# Patient Record
Sex: MALE | Race: BLACK OR AFRICAN AMERICAN | NOT HISPANIC OR LATINO | Employment: FULL TIME | URBAN - METROPOLITAN AREA
[De-identification: names, ages, dates, MRNs, and addresses within clinical notes are randomized per-mention and may not be internally consistent; named-entity substitution may affect disease eponyms.]

---

## 2024-03-12 ENCOUNTER — CLINICAL SUPPORT (OUTPATIENT)
Dept: EMERGENCY MEDICINE | Facility: HOSPITAL | Age: 44
End: 2024-03-12
Payer: COMMERCIAL

## 2024-03-12 ENCOUNTER — HOSPITAL ENCOUNTER (EMERGENCY)
Facility: HOSPITAL | Age: 44
Discharge: HOME | End: 2024-03-12
Attending: STUDENT IN AN ORGANIZED HEALTH CARE EDUCATION/TRAINING PROGRAM
Payer: COMMERCIAL

## 2024-03-12 ENCOUNTER — APPOINTMENT (OUTPATIENT)
Dept: RADIOLOGY | Facility: HOSPITAL | Age: 44
End: 2024-03-12
Payer: COMMERCIAL

## 2024-03-12 VITALS
RESPIRATION RATE: 18 BRPM | HEIGHT: 67 IN | DIASTOLIC BLOOD PRESSURE: 92 MMHG | WEIGHT: 185 LBS | BODY MASS INDEX: 29.03 KG/M2 | TEMPERATURE: 97.4 F | SYSTOLIC BLOOD PRESSURE: 194 MMHG | OXYGEN SATURATION: 96 % | HEART RATE: 94 BPM

## 2024-03-12 DIAGNOSIS — M10.9 ACUTE GOUT OF RIGHT ANKLE, UNSPECIFIED CAUSE: Primary | ICD-10-CM

## 2024-03-12 LAB
ABO GROUP (TYPE) IN BLOOD: NORMAL
ALBUMIN SERPL BCP-MCNC: 3.6 G/DL (ref 3.4–5)
ALP SERPL-CCNC: 48 U/L (ref 33–120)
ALT SERPL W P-5'-P-CCNC: 9 U/L (ref 10–52)
ANION GAP SERPL CALC-SCNC: 14 MMOL/L (ref 10–20)
ANTIBODY SCREEN: NORMAL
APTT PPP: 34 SECONDS (ref 27–38)
AST SERPL W P-5'-P-CCNC: 19 U/L (ref 9–39)
ATRIAL RATE: 105 BPM
BASOPHILS # BLD AUTO: 0.12 X10*3/UL (ref 0–0.1)
BASOPHILS NFR BLD AUTO: 0.8 %
BILIRUB SERPL-MCNC: 0.7 MG/DL (ref 0–1.2)
BUN SERPL-MCNC: 18 MG/DL (ref 6–23)
CALCIUM SERPL-MCNC: 9 MG/DL (ref 8.6–10.6)
CHLORIDE SERPL-SCNC: 100 MMOL/L (ref 98–107)
CO2 SERPL-SCNC: 27 MMOL/L (ref 21–32)
CREAT SERPL-MCNC: 1.11 MG/DL (ref 0.5–1.3)
CRP SERPL-MCNC: 4.45 MG/DL
EGFRCR SERPLBLD CKD-EPI 2021: 84 ML/MIN/1.73M*2
EOSINOPHIL # BLD AUTO: 0.26 X10*3/UL (ref 0–0.7)
EOSINOPHIL NFR BLD AUTO: 1.8 %
ERYTHROCYTE [DISTWIDTH] IN BLOOD BY AUTOMATED COUNT: 13.4 % (ref 11.5–14.5)
ERYTHROCYTE [SEDIMENTATION RATE] IN BLOOD BY WESTERGREN METHOD: 44 MM/H (ref 0–15)
GLUCOSE SERPL-MCNC: 172 MG/DL (ref 74–99)
HCT VFR BLD AUTO: 45.1 % (ref 41–52)
HGB BLD-MCNC: 15.8 G/DL (ref 13.5–17.5)
IMM GRANULOCYTES # BLD AUTO: 0.04 X10*3/UL (ref 0–0.7)
IMM GRANULOCYTES NFR BLD AUTO: 0.3 % (ref 0–0.9)
INR PPP: 1.1 (ref 0.9–1.1)
LYMPHOCYTES # BLD AUTO: 2.5 X10*3/UL (ref 1.2–4.8)
LYMPHOCYTES NFR BLD AUTO: 17.3 %
MCH RBC QN AUTO: 26.5 PG (ref 26–34)
MCHC RBC AUTO-ENTMCNC: 35 G/DL (ref 32–36)
MCV RBC AUTO: 76 FL (ref 80–100)
MONOCYTES # BLD AUTO: 1.17 X10*3/UL (ref 0.1–1)
MONOCYTES NFR BLD AUTO: 8.1 %
NEUTROPHILS # BLD AUTO: 10.35 X10*3/UL (ref 1.2–7.7)
NEUTROPHILS NFR BLD AUTO: 71.7 %
NRBC BLD-RTO: 0 /100 WBCS (ref 0–0)
P AXIS: 77 DEGREES
P OFFSET: 199 MS
P ONSET: 145 MS
PLATELET # BLD AUTO: 214 X10*3/UL (ref 150–450)
POTASSIUM SERPL-SCNC: 3.4 MMOL/L (ref 3.5–5.3)
PR INTERVAL: 146 MS
PROT SERPL-MCNC: 7.4 G/DL (ref 6.4–8.2)
PROTHROMBIN TIME: 12.5 SECONDS (ref 9.8–12.8)
Q ONSET: 218 MS
QRS COUNT: 17 BEATS
QRS DURATION: 86 MS
QT INTERVAL: 392 MS
QTC CALCULATION(BAZETT): 518 MS
QTC FREDERICIA: 472 MS
R AXIS: 68 DEGREES
RBC # BLD AUTO: 5.97 X10*6/UL (ref 4.5–5.9)
RH FACTOR (ANTIGEN D): NORMAL
SODIUM SERPL-SCNC: 138 MMOL/L (ref 136–145)
T AXIS: 58 DEGREES
T OFFSET: 414 MS
VENTRICULAR RATE: 105 BPM
WBC # BLD AUTO: 14.4 X10*3/UL (ref 4.4–11.3)

## 2024-03-12 PROCEDURE — 80053 COMPREHEN METABOLIC PANEL: CPT | Performed by: STUDENT IN AN ORGANIZED HEALTH CARE EDUCATION/TRAINING PROGRAM

## 2024-03-12 PROCEDURE — 71045 X-RAY EXAM CHEST 1 VIEW: CPT | Performed by: STUDENT IN AN ORGANIZED HEALTH CARE EDUCATION/TRAINING PROGRAM

## 2024-03-12 PROCEDURE — 85025 COMPLETE CBC W/AUTO DIFF WBC: CPT | Performed by: STUDENT IN AN ORGANIZED HEALTH CARE EDUCATION/TRAINING PROGRAM

## 2024-03-12 PROCEDURE — 36415 COLL VENOUS BLD VENIPUNCTURE: CPT | Performed by: STUDENT IN AN ORGANIZED HEALTH CARE EDUCATION/TRAINING PROGRAM

## 2024-03-12 PROCEDURE — 73610 X-RAY EXAM OF ANKLE: CPT | Mod: RIGHT SIDE | Performed by: STUDENT IN AN ORGANIZED HEALTH CARE EDUCATION/TRAINING PROGRAM

## 2024-03-12 PROCEDURE — 99284 EMERGENCY DEPT VISIT MOD MDM: CPT | Mod: 25

## 2024-03-12 PROCEDURE — 2500000005 HC RX 250 GENERAL PHARMACY W/O HCPCS: Performed by: STUDENT IN AN ORGANIZED HEALTH CARE EDUCATION/TRAINING PROGRAM

## 2024-03-12 PROCEDURE — 96374 THER/PROPH/DIAG INJ IV PUSH: CPT | Mod: 59

## 2024-03-12 PROCEDURE — 85652 RBC SED RATE AUTOMATED: CPT | Performed by: STUDENT IN AN ORGANIZED HEALTH CARE EDUCATION/TRAINING PROGRAM

## 2024-03-12 PROCEDURE — 71045 X-RAY EXAM CHEST 1 VIEW: CPT

## 2024-03-12 PROCEDURE — 96375 TX/PRO/DX INJ NEW DRUG ADDON: CPT | Mod: 59

## 2024-03-12 PROCEDURE — 96361 HYDRATE IV INFUSION ADD-ON: CPT | Mod: 59

## 2024-03-12 PROCEDURE — 86901 BLOOD TYPING SEROLOGIC RH(D): CPT | Performed by: STUDENT IN AN ORGANIZED HEALTH CARE EDUCATION/TRAINING PROGRAM

## 2024-03-12 PROCEDURE — 93005 ELECTROCARDIOGRAM TRACING: CPT

## 2024-03-12 PROCEDURE — 2500000004 HC RX 250 GENERAL PHARMACY W/ HCPCS (ALT 636 FOR OP/ED): Performed by: STUDENT IN AN ORGANIZED HEALTH CARE EDUCATION/TRAINING PROGRAM

## 2024-03-12 PROCEDURE — 85730 THROMBOPLASTIN TIME PARTIAL: CPT | Performed by: STUDENT IN AN ORGANIZED HEALTH CARE EDUCATION/TRAINING PROGRAM

## 2024-03-12 PROCEDURE — 20605 DRAIN/INJ JOINT/BURSA W/O US: CPT | Mod: RT

## 2024-03-12 PROCEDURE — 86140 C-REACTIVE PROTEIN: CPT | Performed by: STUDENT IN AN ORGANIZED HEALTH CARE EDUCATION/TRAINING PROGRAM

## 2024-03-12 PROCEDURE — 73610 X-RAY EXAM OF ANKLE: CPT | Mod: RT

## 2024-03-12 PROCEDURE — 99285 EMERGENCY DEPT VISIT HI MDM: CPT | Performed by: STUDENT IN AN ORGANIZED HEALTH CARE EDUCATION/TRAINING PROGRAM

## 2024-03-12 RX ORDER — PREDNISONE 20 MG/1
20 TABLET ORAL DAILY
Qty: 10 TABLET | Refills: 0 | Status: SHIPPED | OUTPATIENT
Start: 2024-03-12 | End: 2024-03-22

## 2024-03-12 RX ORDER — ACETAMINOPHEN 325 MG/1
975 TABLET ORAL ONCE
Status: COMPLETED | OUTPATIENT
Start: 2024-03-12 | End: 2024-03-12

## 2024-03-12 RX ORDER — DIPHENHYDRAMINE HYDROCHLORIDE 50 MG/ML
25 INJECTION INTRAMUSCULAR; INTRAVENOUS ONCE
Status: COMPLETED | OUTPATIENT
Start: 2024-03-12 | End: 2024-03-12

## 2024-03-12 RX ORDER — METOCLOPRAMIDE HYDROCHLORIDE 5 MG/ML
10 INJECTION INTRAMUSCULAR; INTRAVENOUS ONCE
Status: COMPLETED | OUTPATIENT
Start: 2024-03-12 | End: 2024-03-12

## 2024-03-12 RX ORDER — LIDOCAINE 560 MG/1
1 PATCH PERCUTANEOUS; TOPICAL; TRANSDERMAL ONCE
Status: DISCONTINUED | OUTPATIENT
Start: 2024-03-12 | End: 2024-03-12 | Stop reason: HOSPADM

## 2024-03-12 RX ORDER — KETOROLAC TROMETHAMINE 15 MG/ML
15 INJECTION, SOLUTION INTRAMUSCULAR; INTRAVENOUS ONCE
Status: COMPLETED | OUTPATIENT
Start: 2024-03-12 | End: 2024-03-12

## 2024-03-12 RX ADMIN — SODIUM CHLORIDE, POTASSIUM CHLORIDE, SODIUM LACTATE AND CALCIUM CHLORIDE 1000 ML: 600; 310; 30; 20 INJECTION, SOLUTION INTRAVENOUS at 05:22

## 2024-03-12 RX ADMIN — KETOROLAC TROMETHAMINE 15 MG: 15 INJECTION, SOLUTION INTRAMUSCULAR; INTRAVENOUS at 05:20

## 2024-03-12 RX ADMIN — ACETAMINOPHEN 975 MG: 325 TABLET ORAL at 05:21

## 2024-03-12 RX ADMIN — METOCLOPRAMIDE 10 MG: 5 INJECTION, SOLUTION INTRAMUSCULAR; INTRAVENOUS at 07:32

## 2024-03-12 RX ADMIN — LIDOCAINE 1 PATCH: 4 PATCH TOPICAL at 05:22

## 2024-03-12 RX ADMIN — DIPHENHYDRAMINE HYDROCHLORIDE 25 MG: 50 INJECTION INTRAMUSCULAR; INTRAVENOUS at 07:32

## 2024-03-12 ASSESSMENT — PAIN DESCRIPTION - LOCATION: LOCATION: FOOT

## 2024-03-12 ASSESSMENT — PAIN SCALES - GENERAL: PAINLEVEL_OUTOF10: 10 - WORST POSSIBLE PAIN

## 2024-03-12 ASSESSMENT — PAIN - FUNCTIONAL ASSESSMENT: PAIN_FUNCTIONAL_ASSESSMENT: 0-10

## 2024-03-12 NOTE — DISCHARGE INSTRUCTIONS
Please return to the emergency department if you notice any new redness, swelling, fevers, chills, or warmth around your affected joint as this may to get an infection that could acutely threaten your joint.

## 2024-03-12 NOTE — ED TRIAGE NOTES
Pt brought in for R foot pain and swelling by EMS. PT hypertensive and tachycardic for EMS. Pt reports being compliant with his blood pressure meds.

## 2024-03-12 NOTE — Clinical Note
Theron Moran was seen and treated in our emergency department on 3/12/2024.  He may return to work on 03/13/2024.       If you have any questions or concerns, please don't hesitate to call.      Hemant Ashley MD

## 2024-03-12 NOTE — PROGRESS NOTES
ATTENDING NOTE for Hemant Ashley MD:    ATTENDING ATTESTATION:  The patient was seen by the resident/fellow.  I have personally performed a substantive portion of the encounter.  I have seen and examined the patient; agree with the workup, evaluation, MDM, management and diagnosis.  The care plan has been discussed with the resident/fellow; I have reviewed the resident/fellow´s note and agree with the documented findings with the exception/addition of the following:    Patient is a 43-year-old man who presents with right leg swelling and pain.  Patient reports he has had the symptoms for few weeks and went to [facility] last week for the same symptoms.  He reports that he has been taking colchicine which has not been able to control symptoms.  No fevers or chills sudden worsening or redness.  Denies any falls or trauma.  He also reports that he has had a persistent headache throughout the duration and is worried by his blood pressure.  He reports that the headache was not sudden in onset or maximal in onset.  No neck stiffness fevers or chills.  Suspicion for subarachnoid hemorrhage meningitis encephalitis is low.  Doubt giant cell arteritis given no ocular complaints.  He had a CT scan at [facility] last week which was negative for mass.  Doubt dural venous thrombus.  Doubt cavernous sinus thrombus.  Doubt acute angle-closure glaucoma.  The exact etiology of his headache is unclear but suspect is likely a migraine.  Low suspicion for life-threatening pathology.  Patient's right foot pain seems most consistent with gout.  Doubt DVT and we do not see any evidence of a necrotizing soft tissue infection or cellulitis.  Good distal pulses and perfusion point away from arterial occlusion.  Patient had swelling of the first MCP as well as the ankle but not the knee.  No calf tenderness or swelling.  I am able to range the ankle little bit but this does cause pain which raises suspicion for septic arthritis  however given the time course, septic arthritis seems less likely.  We did discuss that we could try arthrocentesis and he provided consent to do this.  Patient did not tolerate the procedure due to pain and asked us to stop.  We were unable to get any fluid which points away from a large effusion.  I did discuss we could try again with a different  but the patient did not want to do so.  I discussed the risks of delayed diagnosis and septic arthritis and patient expressed understanding of this.  He has capacity make this decision so I want to respect his autonomy.  My suspicion for septic arthritis is low as well.  We treated his pain here and discussed using a walking boot as well as crutches and the need to follow-up urgently with sports medicine orthopedic surgery.  Patient was hypertensive but has no symptoms of hypertensive emergency.  We discussed the need to follow-up with his regular doctor discuss his blood pressure as well.

## 2024-03-12 NOTE — PROGRESS NOTES
Emergency Medicine Transition of Care Note.    I assumed care of Theron Moran at signout.  Please see the previous ED provider note for all HPI, PE and MDM prior to my arrival. This is in addition to the primary record.    In brief Theron Moran is an 43 y.o. male presenting for   Chief Complaint   Patient presents with    Foot Pain     At the time of signout we were awaiting:  reassessment following treatment of his headache and trial of ambulation.    On reassessment, patient's headache had improved.  He was able to range his right ankle.  The joint is swollen but not erythematous or warm to the touch.  Patient is afebrile and his tachycardia resolved following analgesia and migraine cocktail.  Prior team had low clinical suspicion for septic joint and I agree with this assessment.  Patient does have a history of gout and I believe his presentation today is consistent with gouty arthritis flare.  Patient felt well going home and was discharged home in stable condition.          Procedure  Procedures    Parish Mathis, DO

## 2024-03-12 NOTE — ED PROVIDER NOTES
HPI   Chief Complaint   Patient presents with    Foot Pain       HPI     Patient is a 43-year-old male with a past medical history of diabetes, gout, hypertension, migraines, polysubstance use disorder including THC to gain and tobacco, subarachnoid hemorrhage in 2017 presenting to the emergency department with right foot pain.  Of note, patient was seen 8 days ago at [facility] emergency department for similar symptoms.  States that his pain and swelling of his right ankle has been present for approximately a month.  Initially involved only his big toe but now extends to his entire foot.  Denies any erythema, warmth on the extremity.  Denies any fever, chills, chest pain, abdominal pain, nausea, vomiting, pain with urination, blood in urine or stool.  States that symptoms feels directly similar to his past episodes of gout exacerbation.  At his most recent visit to the ER, was prescribed colchicine and NSAIDs which she has been taking with only moderate symptomatic improvement.  His pain was colicky and worse tonight and so called 911 who brought him to the emergency department for further evaluation.  Patient denies any recent trauma to the site.               Naomi Coma Scale Score: 15                     Patient History   No past medical history on file.  No past surgical history on file.  No family history on file.  Social History     Tobacco Use    Smoking status: Not on file    Smokeless tobacco: Not on file   Substance Use Topics    Alcohol use: Not on file    Drug use: Not on file       Physical Exam   ED Triage Vitals   Temperature Heart Rate Respirations BP   03/12/24 0513 03/12/24 0513 03/12/24 0513 03/12/24 0513   36.5 °C (97.7 °F) (!) 107 20 (!) 213/121      Pulse Ox Temp Source Heart Rate Source Patient Position   03/12/24 0513 03/12/24 0513 03/12/24 0718 --   94 % Oral Monitor       BP Location FiO2 (%)     -- --             Physical Exam  Constitutional:       Appearance: He is not toxic-appearing or  diaphoretic.   HENT:      Head: Normocephalic and atraumatic.      Nose: Nose normal.   Eyes:      General: No scleral icterus.        Right eye: No discharge.         Left eye: No discharge.      Conjunctiva/sclera: Conjunctivae normal.   Cardiovascular:      Rate and Rhythm: Normal rate.      Heart sounds: No murmur heard.     No friction rub. No gallop.   Pulmonary:      Effort: No respiratory distress.      Breath sounds: Normal breath sounds.   Abdominal:      General: There is no distension.      Palpations: Abdomen is soft.   Musculoskeletal:         General: No deformity or signs of injury.      Cervical back: Neck supple. No rigidity.      Comments: Swollen and tender to palpation right ankle.  No overlying erythema, warmth.  Patient's right great toe is also swollen and slightly tender to palpation when compared to the contralateral side.  2+ bilateral DP pulses palpated.  Patient does have full range of motion of his right ankle, however it is limited somewhat secondary to pain.  Acutely tender to palpation over the right ankle.   Skin:     General: Skin is warm and dry.   Neurological:      Mental Status: He is alert.   Psychiatric:         Mood and Affect: Mood normal.         Behavior: Behavior normal.         ED Course & MDM   Diagnoses as of 03/12/24 0809   Acute gout of right ankle, unspecified cause       Medical Decision Making  Patient is a 43-year-old male presenting to the emergency department with ankle pain.  I do feel that the patient's most likely diagnosis is a gout flare.  However, given that his symptoms have been present for a month with only moderate improvement with colchicine and NSAIDs, I did discuss with the patient the possibility of a latent joint infection.  We did perform a arthrocentesis after verbally consenting the patient.  After the area was numbed with focal lidocaine with epinephrine, a needle was inserted into the joint.  Unable to aspirate contents.  Patient very  poorly tolerated the procedure and requested that it be aborted early.  After patient was recovered from the procedure, did discuss a second attempt, using a second provider to attempt, using the ultrasound for better landmark demarcation, and further pain medication to allow for the procedure.  Patient stated that he no longer wanted the procedure.  Additionally stated that after the poke with the needle, his pain had improved significantly.  However, he did not want to risk worsening pain with another needle insertion.  I did discuss with patient that this may risk a limb threatening diagnosis such as a septic joint that could result in loss of the patient's foot, severe lifelong disability, and possible death.  Patient voiced understanding of these possible risks, had decision-making capacity, and still declined further repeat attempt at arthrocentesis.  Overall clinical concern for septic joint is much lower than alternative etiologies as demonstrated by patient's range of motion of his joint, lack of a significant effusion on ultrasound exam, lack of overlying erythema, fact that the patient's swelling and pain has been subacute in onset and present for over a month without acute recent change, and lack of any systemic symptoms of fever and chills.  Although it was against my medical advice, the patient did refuse further repeat procedure.  After this entire process, patient then stated that he also had a headache that he had had for the past several hours, but had not initially mentioned on presentation.  Denied any recent falls, vision change, hearing change, or other focal neurologic signs.  Patient requested further medication to help manage his headache.  Given that patient l was initially complaining of other aches and pains and did not mention his headache, and only secondarily complained of it and that it was not the worst pain headache of his life and was not associate with any new focal neurologic  symptoms, my overall clinical concern for subarachnoid hemorrhage is low.  A headache cocktail was ordered.  Patient will be handed off in stable condition pending reevaluation following meds for a headache.    Procedure  Procedures     Reuben López MD  Resident  03/12/24 0830

## 2024-09-21 ENCOUNTER — HOSPITAL ENCOUNTER (OUTPATIENT)
Age: 44
Setting detail: OBSERVATION
Discharge: NURSING, REHAB OR LONG-TERM CARE | End: 2024-09-23
Attending: EMERGENCY MEDICINE | Admitting: HOSPITALIST
Payer: COMMERCIAL

## 2024-09-21 DIAGNOSIS — F19.10 SUBSTANCE ABUSE (HCC): ICD-10-CM

## 2024-09-21 DIAGNOSIS — R51.9 HEADACHE, UNSPECIFIED HEADACHE TYPE: ICD-10-CM

## 2024-09-21 DIAGNOSIS — I16.0 HYPERTENSIVE URGENCY: ICD-10-CM

## 2024-09-21 DIAGNOSIS — R07.9 CHEST PAIN, UNSPECIFIED TYPE: Primary | ICD-10-CM

## 2024-09-21 DIAGNOSIS — R79.89 ELEVATED TROPONIN: ICD-10-CM

## 2024-09-21 PROCEDURE — 99285 EMERGENCY DEPT VISIT HI MDM: CPT

## 2024-09-22 ENCOUNTER — APPOINTMENT (OUTPATIENT)
Dept: CT IMAGING | Age: 44
End: 2024-09-22
Payer: COMMERCIAL

## 2024-09-22 ENCOUNTER — APPOINTMENT (OUTPATIENT)
Dept: GENERAL RADIOLOGY | Age: 44
End: 2024-09-22
Payer: COMMERCIAL

## 2024-09-22 PROBLEM — R79.89 ELEVATED TROPONIN: Status: ACTIVE | Noted: 2024-09-22

## 2024-09-22 PROBLEM — E66.811 CLASS 1 OBESITY WITHOUT SERIOUS COMORBIDITY WITH BODY MASS INDEX (BMI) OF 32.0 TO 32.9 IN ADULT: Status: ACTIVE | Noted: 2024-09-22

## 2024-09-22 PROBLEM — E11.65 TYPE 2 DIABETES MELLITUS WITH HYPERGLYCEMIA, WITHOUT LONG-TERM CURRENT USE OF INSULIN (HCC): Status: ACTIVE | Noted: 2024-09-22

## 2024-09-22 PROBLEM — I16.0 HYPERTENSIVE URGENCY: Status: ACTIVE | Noted: 2024-09-22

## 2024-09-22 PROBLEM — E66.9 CLASS 1 OBESITY WITHOUT SERIOUS COMORBIDITY WITH BODY MASS INDEX (BMI) OF 32.0 TO 32.9 IN ADULT: Status: ACTIVE | Noted: 2024-09-22

## 2024-09-22 PROBLEM — F14.10 COCAINE USE DISORDER (HCC): Status: ACTIVE | Noted: 2024-09-22

## 2024-09-22 PROBLEM — R07.9 CHEST PAIN: Status: ACTIVE | Noted: 2024-09-22

## 2024-09-22 LAB
ALBUMIN SERPL-MCNC: 3.4 G/DL (ref 3.5–5.2)
ALP SERPL-CCNC: 67 U/L (ref 40–129)
ALT SERPL-CCNC: 41 U/L (ref 0–40)
AMPHET UR QL SCN: NEGATIVE
ANION GAP SERPL CALCULATED.3IONS-SCNC: 15 MMOL/L (ref 7–16)
AST SERPL-CCNC: 24 U/L (ref 0–39)
BARBITURATES UR QL SCN: NEGATIVE
BASOPHILS # BLD: 0.08 K/UL (ref 0–0.2)
BASOPHILS NFR BLD: 1 % (ref 0–2)
BENZODIAZ UR QL: NEGATIVE
BILIRUB SERPL-MCNC: 0.2 MG/DL (ref 0–1.2)
BUN SERPL-MCNC: 19 MG/DL (ref 6–20)
BUPRENORPHINE UR QL: NEGATIVE
CALCIUM SERPL-MCNC: 8.3 MG/DL (ref 8.6–10.2)
CANNABINOIDS UR QL SCN: NEGATIVE
CHLORIDE SERPL-SCNC: 106 MMOL/L (ref 98–107)
CHOLEST SERPL-MCNC: 160 MG/DL
CO2 SERPL-SCNC: 22 MMOL/L (ref 22–29)
COCAINE UR QL SCN: POSITIVE
CREAT SERPL-MCNC: 1.2 MG/DL (ref 0.7–1.2)
EOSINOPHIL # BLD: 0.42 K/UL (ref 0.05–0.5)
EOSINOPHILS RELATIVE PERCENT: 3 % (ref 0–6)
ERYTHROCYTE [DISTWIDTH] IN BLOOD BY AUTOMATED COUNT: 14.7 % (ref 11.5–15)
FENTANYL UR QL: NEGATIVE
GFR, ESTIMATED: 75 ML/MIN/1.73M2
GLUCOSE BLD-MCNC: 107 MG/DL (ref 74–99)
GLUCOSE BLD-MCNC: 138 MG/DL (ref 74–99)
GLUCOSE BLD-MCNC: 154 MG/DL (ref 74–99)
GLUCOSE SERPL-MCNC: 185 MG/DL (ref 74–99)
HBA1C MFR BLD: 6.9 % (ref 4–5.6)
HCT VFR BLD AUTO: 43.4 % (ref 37–54)
HDLC SERPL-MCNC: 58 MG/DL
HGB BLD-MCNC: 13.6 G/DL (ref 12.5–16.5)
IMM GRANULOCYTES # BLD AUTO: 0.22 K/UL (ref 0–0.58)
IMM GRANULOCYTES NFR BLD: 2 % (ref 0–5)
LDLC SERPL CALC-MCNC: 91 MG/DL
LYMPHOCYTES NFR BLD: 1.98 K/UL (ref 1.5–4)
LYMPHOCYTES RELATIVE PERCENT: 13 % (ref 20–42)
MCH RBC QN AUTO: 26.2 PG (ref 26–35)
MCHC RBC AUTO-ENTMCNC: 31.3 G/DL (ref 32–34.5)
MCV RBC AUTO: 83.6 FL (ref 80–99.9)
METHADONE UR QL: NEGATIVE
MONOCYTES NFR BLD: 1.36 K/UL (ref 0.1–0.95)
MONOCYTES NFR BLD: 9 % (ref 2–12)
NEUTROPHILS NFR BLD: 73 % (ref 43–80)
NEUTS SEG NFR BLD: 10.84 K/UL (ref 1.8–7.3)
OPIATES UR QL SCN: NEGATIVE
OXYCODONE UR QL SCN: NEGATIVE
PCP UR QL SCN: NEGATIVE
PLATELET # BLD AUTO: 216 K/UL (ref 130–450)
PMV BLD AUTO: 11.8 FL (ref 7–12)
POTASSIUM SERPL-SCNC: 4 MMOL/L (ref 3.5–5)
PROT SERPL-MCNC: 6.4 G/DL (ref 6.4–8.3)
RBC # BLD AUTO: 5.19 M/UL (ref 3.8–5.8)
SODIUM SERPL-SCNC: 143 MMOL/L (ref 132–146)
TEST INFORMATION: ABNORMAL
TRIGL SERPL-MCNC: 55 MG/DL
TROPONIN I SERPL HS-MCNC: 85 NG/L (ref 0–11)
TROPONIN I SERPL HS-MCNC: 87 NG/L (ref 0–11)
TROPONIN I SERPL HS-MCNC: 93 NG/L (ref 0–11)
TROPONIN I SERPL HS-MCNC: 94 NG/L (ref 0–11)
TSH SERPL DL<=0.05 MIU/L-ACNC: 2.1 UIU/ML (ref 0.27–4.2)
VLDLC SERPL CALC-MCNC: 11 MG/DL
WBC OTHER # BLD: 14.9 K/UL (ref 4.5–11.5)

## 2024-09-22 PROCEDURE — 93005 ELECTROCARDIOGRAM TRACING: CPT | Performed by: NURSE PRACTITIONER

## 2024-09-22 PROCEDURE — 96374 THER/PROPH/DIAG INJ IV PUSH: CPT

## 2024-09-22 PROCEDURE — 96372 THER/PROPH/DIAG INJ SC/IM: CPT

## 2024-09-22 PROCEDURE — 80053 COMPREHEN METABOLIC PANEL: CPT

## 2024-09-22 PROCEDURE — 96375 TX/PRO/DX INJ NEW DRUG ADDON: CPT

## 2024-09-22 PROCEDURE — G0378 HOSPITAL OBSERVATION PER HR: HCPCS

## 2024-09-22 PROCEDURE — 85025 COMPLETE CBC W/AUTO DIFF WBC: CPT

## 2024-09-22 PROCEDURE — 83036 HEMOGLOBIN GLYCOSYLATED A1C: CPT

## 2024-09-22 PROCEDURE — 6370000000 HC RX 637 (ALT 250 FOR IP): Performed by: EMERGENCY MEDICINE

## 2024-09-22 PROCEDURE — 6370000000 HC RX 637 (ALT 250 FOR IP): Performed by: HOSPITALIST

## 2024-09-22 PROCEDURE — 70450 CT HEAD/BRAIN W/O DYE: CPT

## 2024-09-22 PROCEDURE — 6370000000 HC RX 637 (ALT 250 FOR IP): Performed by: INTERNAL MEDICINE

## 2024-09-22 PROCEDURE — 99222 1ST HOSP IP/OBS MODERATE 55: CPT | Performed by: HOSPITALIST

## 2024-09-22 PROCEDURE — 99223 1ST HOSP IP/OBS HIGH 75: CPT | Performed by: INTERNAL MEDICINE

## 2024-09-22 PROCEDURE — 96376 TX/PRO/DX INJ SAME DRUG ADON: CPT

## 2024-09-22 PROCEDURE — APPSS60 APP SPLIT SHARED TIME 46-60 MINUTES

## 2024-09-22 PROCEDURE — 2580000003 HC RX 258: Performed by: HOSPITALIST

## 2024-09-22 PROCEDURE — 6360000002 HC RX W HCPCS: Performed by: EMERGENCY MEDICINE

## 2024-09-22 PROCEDURE — 6360000002 HC RX W HCPCS: Performed by: HOSPITALIST

## 2024-09-22 PROCEDURE — 71046 X-RAY EXAM CHEST 2 VIEWS: CPT

## 2024-09-22 PROCEDURE — 80307 DRUG TEST PRSMV CHEM ANLYZR: CPT

## 2024-09-22 PROCEDURE — 80061 LIPID PANEL: CPT

## 2024-09-22 PROCEDURE — 84484 ASSAY OF TROPONIN QUANT: CPT

## 2024-09-22 PROCEDURE — 82962 GLUCOSE BLOOD TEST: CPT

## 2024-09-22 PROCEDURE — 84443 ASSAY THYROID STIM HORMONE: CPT

## 2024-09-22 RX ORDER — HYDRALAZINE HYDROCHLORIDE 20 MG/ML
10 INJECTION INTRAMUSCULAR; INTRAVENOUS ONCE
Status: COMPLETED | OUTPATIENT
Start: 2024-09-22 | End: 2024-09-22

## 2024-09-22 RX ORDER — POLYETHYLENE GLYCOL 3350 17 G/17G
17 POWDER, FOR SOLUTION ORAL DAILY PRN
Status: DISCONTINUED | OUTPATIENT
Start: 2024-09-22 | End: 2024-09-23 | Stop reason: HOSPADM

## 2024-09-22 RX ORDER — INSULIN LISPRO 100 [IU]/ML
0-4 INJECTION, SOLUTION INTRAVENOUS; SUBCUTANEOUS
Status: DISCONTINUED | OUTPATIENT
Start: 2024-09-22 | End: 2024-09-23 | Stop reason: HOSPADM

## 2024-09-22 RX ORDER — HYDRALAZINE HYDROCHLORIDE 25 MG/1
100 TABLET, FILM COATED ORAL EVERY 8 HOURS SCHEDULED
Status: DISCONTINUED | OUTPATIENT
Start: 2024-09-22 | End: 2024-09-23 | Stop reason: HOSPADM

## 2024-09-22 RX ORDER — DIPHENHYDRAMINE HYDROCHLORIDE 50 MG/ML
25 INJECTION INTRAMUSCULAR; INTRAVENOUS ONCE
Status: COMPLETED | OUTPATIENT
Start: 2024-09-22 | End: 2024-09-22

## 2024-09-22 RX ORDER — LISINOPRIL 10 MG/1
40 TABLET ORAL DAILY
Status: DISCONTINUED | OUTPATIENT
Start: 2024-09-22 | End: 2024-09-23 | Stop reason: HOSPADM

## 2024-09-22 RX ORDER — HYDRALAZINE HYDROCHLORIDE 100 MG/1
100 TABLET, FILM COATED ORAL 3 TIMES DAILY
COMMUNITY

## 2024-09-22 RX ORDER — MORPHINE SULFATE 4 MG/ML
4 INJECTION, SOLUTION INTRAMUSCULAR; INTRAVENOUS ONCE
Status: COMPLETED | OUTPATIENT
Start: 2024-09-22 | End: 2024-09-22

## 2024-09-22 RX ORDER — ASPIRIN 81 MG/1
81 TABLET, CHEWABLE ORAL DAILY
Status: DISCONTINUED | OUTPATIENT
Start: 2024-09-23 | End: 2024-09-23 | Stop reason: HOSPADM

## 2024-09-22 RX ORDER — ATORVASTATIN CALCIUM 40 MG/1
80 TABLET, FILM COATED ORAL DAILY
Status: DISCONTINUED | OUTPATIENT
Start: 2024-09-22 | End: 2024-09-23 | Stop reason: HOSPADM

## 2024-09-22 RX ORDER — POTASSIUM CHLORIDE 7.45 MG/ML
10 INJECTION INTRAVENOUS PRN
Status: DISCONTINUED | OUTPATIENT
Start: 2024-09-22 | End: 2024-09-23 | Stop reason: HOSPADM

## 2024-09-22 RX ORDER — NIFEDIPINE 90 MG/1
90 TABLET, FILM COATED, EXTENDED RELEASE ORAL 2 TIMES DAILY
COMMUNITY

## 2024-09-22 RX ORDER — ONDANSETRON 4 MG/1
4 TABLET, ORALLY DISINTEGRATING ORAL EVERY 8 HOURS PRN
Status: DISCONTINUED | OUTPATIENT
Start: 2024-09-22 | End: 2024-09-23 | Stop reason: HOSPADM

## 2024-09-22 RX ORDER — SODIUM CHLORIDE 0.9 % (FLUSH) 0.9 %
5-40 SYRINGE (ML) INJECTION PRN
Status: DISCONTINUED | OUTPATIENT
Start: 2024-09-22 | End: 2024-09-23 | Stop reason: HOSPADM

## 2024-09-22 RX ORDER — ACETAMINOPHEN 650 MG/1
650 SUPPOSITORY RECTAL EVERY 6 HOURS PRN
Status: DISCONTINUED | OUTPATIENT
Start: 2024-09-22 | End: 2024-09-23 | Stop reason: HOSPADM

## 2024-09-22 RX ORDER — ACETAMINOPHEN 325 MG/1
650 TABLET ORAL EVERY 6 HOURS PRN
Status: DISCONTINUED | OUTPATIENT
Start: 2024-09-22 | End: 2024-09-23 | Stop reason: HOSPADM

## 2024-09-22 RX ORDER — HYDRALAZINE HYDROCHLORIDE 20 MG/ML
10 INJECTION INTRAMUSCULAR; INTRAVENOUS EVERY 4 HOURS PRN
Status: DISCONTINUED | OUTPATIENT
Start: 2024-09-22 | End: 2024-09-23 | Stop reason: HOSPADM

## 2024-09-22 RX ORDER — ALBUTEROL SULFATE 90 UG/1
2 INHALANT RESPIRATORY (INHALATION) EVERY 4 HOURS PRN
COMMUNITY

## 2024-09-22 RX ORDER — ONDANSETRON 2 MG/ML
4 INJECTION INTRAMUSCULAR; INTRAVENOUS EVERY 6 HOURS PRN
Status: DISCONTINUED | OUTPATIENT
Start: 2024-09-22 | End: 2024-09-23 | Stop reason: HOSPADM

## 2024-09-22 RX ORDER — MAGNESIUM SULFATE IN WATER 40 MG/ML
2000 INJECTION, SOLUTION INTRAVENOUS PRN
Status: DISCONTINUED | OUTPATIENT
Start: 2024-09-22 | End: 2024-09-23 | Stop reason: HOSPADM

## 2024-09-22 RX ORDER — INSULIN LISPRO 100 [IU]/ML
0-4 INJECTION, SOLUTION INTRAVENOUS; SUBCUTANEOUS NIGHTLY
Status: DISCONTINUED | OUTPATIENT
Start: 2024-09-22 | End: 2024-09-23 | Stop reason: HOSPADM

## 2024-09-22 RX ORDER — METOCLOPRAMIDE HYDROCHLORIDE 5 MG/ML
10 INJECTION INTRAMUSCULAR; INTRAVENOUS ONCE
Status: COMPLETED | OUTPATIENT
Start: 2024-09-22 | End: 2024-09-22

## 2024-09-22 RX ORDER — SODIUM CHLORIDE 0.9 % (FLUSH) 0.9 %
5-40 SYRINGE (ML) INJECTION EVERY 12 HOURS SCHEDULED
Status: DISCONTINUED | OUTPATIENT
Start: 2024-09-22 | End: 2024-09-23 | Stop reason: HOSPADM

## 2024-09-22 RX ORDER — DILTIAZEM HYDROCHLORIDE 120 MG/1
120 CAPSULE, COATED, EXTENDED RELEASE ORAL DAILY
Status: DISCONTINUED | OUTPATIENT
Start: 2024-09-22 | End: 2024-09-23 | Stop reason: HOSPADM

## 2024-09-22 RX ORDER — ATORVASTATIN CALCIUM 40 MG/1
80 TABLET, FILM COATED ORAL NIGHTLY
Status: DISCONTINUED | OUTPATIENT
Start: 2024-09-22 | End: 2024-09-22

## 2024-09-22 RX ORDER — SODIUM CHLORIDE 9 MG/ML
INJECTION, SOLUTION INTRAVENOUS PRN
Status: DISCONTINUED | OUTPATIENT
Start: 2024-09-22 | End: 2024-09-23 | Stop reason: HOSPADM

## 2024-09-22 RX ORDER — POTASSIUM CHLORIDE 1500 MG/1
40 TABLET, EXTENDED RELEASE ORAL PRN
Status: DISCONTINUED | OUTPATIENT
Start: 2024-09-22 | End: 2024-09-23 | Stop reason: HOSPADM

## 2024-09-22 RX ORDER — ENOXAPARIN SODIUM 100 MG/ML
1 INJECTION SUBCUTANEOUS ONCE
Status: COMPLETED | OUTPATIENT
Start: 2024-09-22 | End: 2024-09-22

## 2024-09-22 RX ADMIN — HYDRALAZINE HYDROCHLORIDE 10 MG: 20 INJECTION, SOLUTION INTRAMUSCULAR; INTRAVENOUS at 02:16

## 2024-09-22 RX ADMIN — HYDRALAZINE HYDROCHLORIDE 100 MG: 25 TABLET ORAL at 18:06

## 2024-09-22 RX ADMIN — LISINOPRIL 40 MG: 10 TABLET ORAL at 10:23

## 2024-09-22 RX ADMIN — DILTIAZEM HYDROCHLORIDE 120 MG: 120 CAPSULE, COATED, EXTENDED RELEASE ORAL at 18:02

## 2024-09-22 RX ADMIN — HYDRALAZINE HYDROCHLORIDE 100 MG: 25 TABLET ORAL at 20:18

## 2024-09-22 RX ADMIN — MORPHINE SULFATE 4 MG: 4 INJECTION, SOLUTION INTRAMUSCULAR; INTRAVENOUS at 03:48

## 2024-09-22 RX ADMIN — HYDRALAZINE HYDROCHLORIDE 10 MG: 20 INJECTION, SOLUTION INTRAMUSCULAR; INTRAVENOUS at 06:14

## 2024-09-22 RX ADMIN — ENOXAPARIN SODIUM 100 MG: 100 INJECTION SUBCUTANEOUS at 10:23

## 2024-09-22 RX ADMIN — NITROGLYCERIN 0.5 INCH: 20 OINTMENT TOPICAL at 02:27

## 2024-09-22 RX ADMIN — DIPHENHYDRAMINE HYDROCHLORIDE 25 MG: 50 INJECTION INTRAMUSCULAR; INTRAVENOUS at 02:29

## 2024-09-22 RX ADMIN — SODIUM CHLORIDE, PRESERVATIVE FREE 10 ML: 5 INJECTION INTRAVENOUS at 20:18

## 2024-09-22 RX ADMIN — ATORVASTATIN CALCIUM 80 MG: 40 TABLET, FILM COATED ORAL at 10:23

## 2024-09-22 RX ADMIN — METOCLOPRAMIDE 10 MG: 5 INJECTION, SOLUTION INTRAMUSCULAR; INTRAVENOUS at 02:29

## 2024-09-22 RX ADMIN — HYDRALAZINE HYDROCHLORIDE 10 MG: 20 INJECTION, SOLUTION INTRAMUSCULAR; INTRAVENOUS at 03:57

## 2024-09-22 ASSESSMENT — PAIN DESCRIPTION - ORIENTATION: ORIENTATION: MID

## 2024-09-22 ASSESSMENT — LIFESTYLE VARIABLES
HOW MANY STANDARD DRINKS CONTAINING ALCOHOL DO YOU HAVE ON A TYPICAL DAY: PATIENT DOES NOT DRINK
HOW OFTEN DO YOU HAVE A DRINK CONTAINING ALCOHOL: NEVER

## 2024-09-22 ASSESSMENT — PAIN DESCRIPTION - LOCATION
LOCATION: CHEST

## 2024-09-22 ASSESSMENT — PAIN SCALES - GENERAL
PAINLEVEL_OUTOF10: 0
PAINLEVEL_OUTOF10: 7
PAINLEVEL_OUTOF10: 0
PAINLEVEL_OUTOF10: 7
PAINLEVEL_OUTOF10: 3

## 2024-09-22 ASSESSMENT — PAIN - FUNCTIONAL ASSESSMENT
PAIN_FUNCTIONAL_ASSESSMENT: 0-10
PAIN_FUNCTIONAL_ASSESSMENT: ACTIVITIES ARE NOT PREVENTED

## 2024-09-22 ASSESSMENT — PAIN DESCRIPTION - FREQUENCY: FREQUENCY: CONTINUOUS

## 2024-09-22 ASSESSMENT — PAIN DESCRIPTION - ONSET: ONSET: SUDDEN

## 2024-09-22 ASSESSMENT — PAIN DESCRIPTION - DESCRIPTORS: DESCRIPTORS: HEAVINESS

## 2024-09-22 ASSESSMENT — PAIN DESCRIPTION - PAIN TYPE: TYPE: ACUTE PAIN

## 2024-09-23 ENCOUNTER — APPOINTMENT (OUTPATIENT)
Age: 44
End: 2024-09-23
Attending: INTERNAL MEDICINE
Payer: COMMERCIAL

## 2024-09-23 ENCOUNTER — APPOINTMENT (OUTPATIENT)
Dept: NUCLEAR MEDICINE | Age: 44
End: 2024-09-23
Attending: INTERNAL MEDICINE
Payer: COMMERCIAL

## 2024-09-23 VITALS
HEART RATE: 99 BPM | DIASTOLIC BLOOD PRESSURE: 68 MMHG | RESPIRATION RATE: 16 BRPM | BODY MASS INDEX: 32.96 KG/M2 | OXYGEN SATURATION: 97 % | WEIGHT: 210 LBS | SYSTOLIC BLOOD PRESSURE: 120 MMHG | TEMPERATURE: 98.6 F | HEIGHT: 67 IN

## 2024-09-23 LAB
B PERT DNA SPEC QL NAA+PROBE: NOT DETECTED
C PNEUM DNA NPH QL NAA+NON-PROBE: NOT DETECTED
CALCIUM SERPL-MCNC: 8.3 MG/DL (ref 8.6–10.2)
CHLORIDE SERPL-SCNC: 105 MMOL/L (ref 98–107)
CO2 SERPL-SCNC: 25 MMOL/L (ref 22–29)
CREAT SERPL-MCNC: 1.1 MG/DL (ref 0.7–1.2)
ECHO BSA: 2.12 M2
EKG ATRIAL RATE: 102 BPM
EKG P AXIS: 79 DEGREES
EKG P-R INTERVAL: 146 MS
EKG Q-T INTERVAL: 348 MS
EKG QRS DURATION: 88 MS
EKG QTC CALCULATION (BAZETT): 453 MS
EKG R AXIS: 58 DEGREES
EKG T AXIS: 82 DEGREES
EKG VENTRICULAR RATE: 102 BPM
ERYTHROCYTE [DISTWIDTH] IN BLOOD BY AUTOMATED COUNT: 15.8 % (ref 11.5–15)
FLUAV RNA NPH QL NAA+NON-PROBE: NOT DETECTED
FLUBV RNA NPH QL NAA+NON-PROBE: NOT DETECTED
GFR, ESTIMATED: 83 ML/MIN/1.73M2
GLUCOSE BLD-MCNC: 105 MG/DL (ref 74–99)
GLUCOSE BLD-MCNC: 164 MG/DL (ref 74–99)
GLUCOSE SERPL-MCNC: 110 MG/DL (ref 74–99)
HADV DNA NPH QL NAA+NON-PROBE: NOT DETECTED
HCOV 229E RNA NPH QL NAA+NON-PROBE: NOT DETECTED
HCOV NL63 RNA NPH QL NAA+NON-PROBE: NOT DETECTED
HCT VFR BLD AUTO: 44.2 % (ref 37–54)
HGB BLD-MCNC: 13.9 G/DL (ref 12.5–16.5)
HMPV RNA NPH QL NAA+NON-PROBE: NOT DETECTED
HPIV1 RNA NPH QL NAA+NON-PROBE: NOT DETECTED
HPIV2 RNA NPH QL NAA+NON-PROBE: NOT DETECTED
HPIV3 RNA NPH QL NAA+NON-PROBE: NOT DETECTED
HPIV4 RNA NPH QL NAA+NON-PROBE: NOT DETECTED
M PNEUMO DNA NPH QL NAA+NON-PROBE: NOT DETECTED
MAGNESIUM SERPL-MCNC: 1.7 MG/DL (ref 1.6–2.6)
MCH RBC QN AUTO: 25.8 PG (ref 26–35)
MCHC RBC AUTO-ENTMCNC: 31.4 G/DL (ref 32–34.5)
MCV RBC AUTO: 82.2 FL (ref 80–99.9)
NUC STRESS EJECTION FRACTION: 62 %
PLATELET, FLUORESCENCE: 190 K/UL (ref 130–450)
PMV BLD AUTO: 12 FL (ref 7–12)
POTASSIUM SERPL-SCNC: 4.3 MMOL/L (ref 3.5–5)
RBC # BLD AUTO: 5.38 M/UL (ref 3.8–5.8)
RV+EV RNA NPH QL NAA+NON-PROBE: NOT DETECTED
SARS-COV-2 RNA NPH QL NAA+NON-PROBE: NOT DETECTED
SODIUM SERPL-SCNC: 139 MMOL/L (ref 132–146)
SPECIMEN DESCRIPTION: NORMAL
STRESS BASELINE DIAS BP: 104 MMHG
STRESS BASELINE HR: 98 BPM
STRESS BASELINE SYS BP: 144 MMHG
STRESS ESTIMATED WORKLOAD: 1 METS
STRESS PEAK DIAS BP: 96 MMHG
STRESS PEAK SYS BP: 162 MMHG
STRESS PERCENT HR ACHIEVED: 65 %
STRESS POST PEAK HR: 115 BPM
STRESS RATE PRESSURE PRODUCT: NORMAL BPM*MMHG
STRESS ST DEPRESSION: 0 MM
STRESS TARGET HR: 177 BPM
TID: 1.03
WBC OTHER # BLD: 13.2 K/UL (ref 4.5–11.5)

## 2024-09-23 PROCEDURE — 0202U NFCT DS 22 TRGT SARS-COV-2: CPT

## 2024-09-23 PROCEDURE — 3430000000 HC RX DIAGNOSTIC RADIOPHARMACEUTICAL: Performed by: RADIOLOGY

## 2024-09-23 PROCEDURE — 99232 SBSQ HOSP IP/OBS MODERATE 35: CPT | Performed by: INTERNAL MEDICINE

## 2024-09-23 PROCEDURE — 82962 GLUCOSE BLOOD TEST: CPT

## 2024-09-23 PROCEDURE — 78452 HT MUSCLE IMAGE SPECT MULT: CPT

## 2024-09-23 PROCEDURE — 6370000000 HC RX 637 (ALT 250 FOR IP): Performed by: INTERNAL MEDICINE

## 2024-09-23 PROCEDURE — G0378 HOSPITAL OBSERVATION PER HR: HCPCS

## 2024-09-23 PROCEDURE — 93016 CV STRESS TEST SUPVJ ONLY: CPT | Performed by: INTERNAL MEDICINE

## 2024-09-23 PROCEDURE — 93017 CV STRESS TEST TRACING ONLY: CPT

## 2024-09-23 PROCEDURE — A9500 TC99M SESTAMIBI: HCPCS | Performed by: RADIOLOGY

## 2024-09-23 PROCEDURE — 78452 HT MUSCLE IMAGE SPECT MULT: CPT | Performed by: INTERNAL MEDICINE

## 2024-09-23 PROCEDURE — 93018 CV STRESS TEST I&R ONLY: CPT | Performed by: INTERNAL MEDICINE

## 2024-09-23 PROCEDURE — 93010 ELECTROCARDIOGRAM REPORT: CPT | Performed by: INTERNAL MEDICINE

## 2024-09-23 PROCEDURE — 99231 SBSQ HOSP IP/OBS SF/LOW 25: CPT | Performed by: INTERNAL MEDICINE

## 2024-09-23 PROCEDURE — 6370000000 HC RX 637 (ALT 250 FOR IP): Performed by: HOSPITALIST

## 2024-09-23 PROCEDURE — 83735 ASSAY OF MAGNESIUM: CPT

## 2024-09-23 PROCEDURE — 85027 COMPLETE CBC AUTOMATED: CPT

## 2024-09-23 PROCEDURE — 6360000002 HC RX W HCPCS: Performed by: INTERNAL MEDICINE

## 2024-09-23 PROCEDURE — 2580000003 HC RX 258: Performed by: HOSPITALIST

## 2024-09-23 PROCEDURE — 80048 BASIC METABOLIC PNL TOTAL CA: CPT

## 2024-09-23 RX ORDER — REGADENOSON 0.08 MG/ML
0.4 INJECTION, SOLUTION INTRAVENOUS
Status: COMPLETED | OUTPATIENT
Start: 2024-09-23 | End: 2024-09-23

## 2024-09-23 RX ORDER — LISINOPRIL 40 MG/1
40 TABLET ORAL DAILY
DISCHARGE
Start: 2024-09-24

## 2024-09-23 RX ORDER — ATORVASTATIN CALCIUM 80 MG/1
80 TABLET, FILM COATED ORAL DAILY
DISCHARGE
Start: 2024-09-24

## 2024-09-23 RX ORDER — TETRAKIS(2-METHOXYISOBUTYLISOCYANIDE)COPPER(I) TETRAFLUOROBORATE 1 MG/ML
10 INJECTION, POWDER, LYOPHILIZED, FOR SOLUTION INTRAVENOUS
Status: COMPLETED | OUTPATIENT
Start: 2024-09-23 | End: 2024-09-23

## 2024-09-23 RX ORDER — TETRAKIS(2-METHOXYISOBUTYLISOCYANIDE)COPPER(I) TETRAFLUOROBORATE 1 MG/ML
30 INJECTION, POWDER, LYOPHILIZED, FOR SOLUTION INTRAVENOUS
Status: COMPLETED | OUTPATIENT
Start: 2024-09-23 | End: 2024-09-23

## 2024-09-23 RX ADMIN — REGADENOSON 0.4 MG: 0.08 INJECTION, SOLUTION INTRAVENOUS at 14:00

## 2024-09-23 RX ADMIN — Medication 35 MILLICURIE: at 14:05

## 2024-09-23 RX ADMIN — LISINOPRIL 40 MG: 10 TABLET ORAL at 07:50

## 2024-09-23 RX ADMIN — ATORVASTATIN CALCIUM 80 MG: 40 TABLET, FILM COATED ORAL at 07:50

## 2024-09-23 RX ADMIN — Medication 10 MILLICURIE: at 12:39

## 2024-09-23 RX ADMIN — ASPIRIN 81 MG 81 MG: 81 TABLET ORAL at 07:50

## 2024-09-23 RX ADMIN — SODIUM CHLORIDE, PRESERVATIVE FREE 10 ML: 5 INJECTION INTRAVENOUS at 07:50

## 2024-09-23 RX ADMIN — HYDRALAZINE HYDROCHLORIDE 100 MG: 25 TABLET ORAL at 04:40

## 2024-09-23 RX ADMIN — DILTIAZEM HYDROCHLORIDE 120 MG: 120 CAPSULE, COATED, EXTENDED RELEASE ORAL at 08:10

## 2024-09-23 ASSESSMENT — PAIN SCALES - GENERAL: PAINLEVEL_OUTOF10: 0

## 2024-09-23 ASSESSMENT — PAIN - FUNCTIONAL ASSESSMENT: PAIN_FUNCTIONAL_ASSESSMENT: NONE - DENIES PAIN

## 2024-10-08 ENCOUNTER — TELEPHONE (OUTPATIENT)
Dept: CARDIOLOGY CLINIC | Age: 44
End: 2024-10-08

## 2024-10-08 NOTE — TELEPHONE ENCOUNTER
Upon review of chart, patient was discharged to [facility] in [place].  I spoke with Tung at [facility] who advised patient was discharged on 9/28/24.  Patient lives in [place].  Will address F/U if patient calls to schedule.

## 2025-01-26 ENCOUNTER — APPOINTMENT (OUTPATIENT)
Dept: RADIOLOGY | Facility: HOSPITAL | Age: 45
End: 2025-01-26
Payer: COMMERCIAL

## 2025-01-26 ENCOUNTER — CLINICAL SUPPORT (OUTPATIENT)
Dept: EMERGENCY MEDICINE | Facility: HOSPITAL | Age: 45
End: 2025-01-26
Payer: COMMERCIAL

## 2025-01-26 ENCOUNTER — HOSPITAL ENCOUNTER (OUTPATIENT)
Facility: HOSPITAL | Age: 45
Setting detail: OBSERVATION
Discharge: LEFT BEFORE DISCHARGE | End: 2025-01-28
Attending: EMERGENCY MEDICINE | Admitting: INTERNAL MEDICINE
Payer: COMMERCIAL

## 2025-01-26 DIAGNOSIS — F10.10 ALCOHOL ABUSE: ICD-10-CM

## 2025-01-26 DIAGNOSIS — R07.9 ACUTE CHEST PAIN: Primary | ICD-10-CM

## 2025-01-26 DIAGNOSIS — M10.9 ACUTE GOUT INVOLVING TOE OF LEFT FOOT, UNSPECIFIED CAUSE: ICD-10-CM

## 2025-01-26 DIAGNOSIS — I10 ESSENTIAL (PRIMARY) HYPERTENSION: ICD-10-CM

## 2025-01-26 DIAGNOSIS — I16.1 HYPERTENSIVE EMERGENCY: ICD-10-CM

## 2025-01-26 LAB
ALBUMIN SERPL BCP-MCNC: 4 G/DL (ref 3.4–5)
ALP SERPL-CCNC: 47 U/L (ref 33–120)
ALT SERPL W P-5'-P-CCNC: 40 U/L (ref 10–52)
AMPHETAMINES UR QL SCN: ABNORMAL
ANION GAP SERPL CALC-SCNC: 14 MMOL/L (ref 10–20)
APPEARANCE UR: CLEAR
AST SERPL W P-5'-P-CCNC: 41 U/L (ref 9–39)
ATRIAL RATE: 110 BPM
BARBITURATES UR QL SCN: ABNORMAL
BASOPHILS # BLD AUTO: 0.1 X10*3/UL (ref 0–0.1)
BASOPHILS NFR BLD AUTO: 0.8 %
BENZODIAZ UR QL SCN: ABNORMAL
BILIRUB SERPL-MCNC: 0.7 MG/DL (ref 0–1.2)
BILIRUB UR STRIP.AUTO-MCNC: NEGATIVE MG/DL
BUN SERPL-MCNC: 25 MG/DL (ref 6–23)
BZE UR QL SCN: ABNORMAL
CALCIUM SERPL-MCNC: 9.3 MG/DL (ref 8.6–10.6)
CANNABINOIDS UR QL SCN: ABNORMAL
CARDIAC TROPONIN I PNL SERPL HS: 127 NG/L (ref 0–53)
CARDIAC TROPONIN I PNL SERPL HS: 143 NG/L (ref 0–53)
CARDIAC TROPONIN I PNL SERPL HS: 145 NG/L (ref 0–53)
CARDIAC TROPONIN I PNL SERPL HS: 156 NG/L (ref 0–53)
CHLORIDE SERPL-SCNC: 103 MMOL/L (ref 98–107)
CHOLEST SERPL-MCNC: 214 MG/DL (ref 0–199)
CHOLESTEROL/HDL RATIO: 4.1
CO2 SERPL-SCNC: 26 MMOL/L (ref 21–32)
COLOR UR: YELLOW
CREAT SERPL-MCNC: 1.17 MG/DL (ref 0.5–1.3)
CRP SERPL-MCNC: 1.68 MG/DL
EGFRCR SERPLBLD CKD-EPI 2021: 79 ML/MIN/1.73M*2
EOSINOPHIL # BLD AUTO: 0.25 X10*3/UL (ref 0–0.7)
EOSINOPHIL NFR BLD AUTO: 1.9 %
ERYTHROCYTE [DISTWIDTH] IN BLOOD BY AUTOMATED COUNT: 13.7 % (ref 11.5–14.5)
ERYTHROCYTE [SEDIMENTATION RATE] IN BLOOD BY WESTERGREN METHOD: 20 MM/H (ref 0–15)
ETHANOL SERPL-MCNC: <10 MG/DL
FENTANYL+NORFENTANYL UR QL SCN: ABNORMAL
GLUCOSE BLD MANUAL STRIP-MCNC: 108 MG/DL (ref 74–99)
GLUCOSE BLD MANUAL STRIP-MCNC: 162 MG/DL (ref 74–99)
GLUCOSE SERPL-MCNC: 119 MG/DL (ref 74–99)
GLUCOSE UR STRIP.AUTO-MCNC: NORMAL MG/DL
HCT VFR BLD AUTO: 45.3 % (ref 41–52)
HDLC SERPL-MCNC: 52.1 MG/DL
HGB BLD-MCNC: 15.5 G/DL (ref 13.5–17.5)
IMM GRANULOCYTES # BLD AUTO: 0.05 X10*3/UL (ref 0–0.7)
IMM GRANULOCYTES NFR BLD AUTO: 0.4 % (ref 0–0.9)
KETONES UR STRIP.AUTO-MCNC: NEGATIVE MG/DL
LDLC SERPL CALC-MCNC: 135 MG/DL
LEUKOCYTE ESTERASE UR QL STRIP.AUTO: NEGATIVE
LYMPHOCYTES # BLD AUTO: 2 X10*3/UL (ref 1.2–4.8)
LYMPHOCYTES NFR BLD AUTO: 15.2 %
MCH RBC QN AUTO: 26.2 PG (ref 26–34)
MCHC RBC AUTO-ENTMCNC: 34.2 G/DL (ref 32–36)
MCV RBC AUTO: 77 FL (ref 80–100)
METHADONE UR QL SCN: ABNORMAL
MONOCYTES # BLD AUTO: 1.26 X10*3/UL (ref 0.1–1)
MONOCYTES NFR BLD AUTO: 9.6 %
MUCOUS THREADS #/AREA URNS AUTO: NORMAL /LPF
NEUTROPHILS # BLD AUTO: 9.53 X10*3/UL (ref 1.2–7.7)
NEUTROPHILS NFR BLD AUTO: 72.1 %
NITRITE UR QL STRIP.AUTO: NEGATIVE
NON HDL CHOLESTEROL: 162 MG/DL (ref 0–149)
NRBC BLD-RTO: 0 /100 WBCS (ref 0–0)
OPIATES UR QL SCN: ABNORMAL
OXYCODONE+OXYMORPHONE UR QL SCN: ABNORMAL
P AXIS: 72 DEGREES
P OFFSET: 200 MS
P ONSET: 144 MS
PCP UR QL SCN: ABNORMAL
PH UR STRIP.AUTO: 5.5 [PH]
PLATELET # BLD AUTO: 227 X10*3/UL (ref 150–450)
POTASSIUM SERPL-SCNC: 3.7 MMOL/L (ref 3.5–5.3)
PR INTERVAL: 152 MS
PROT SERPL-MCNC: 7.3 G/DL (ref 6.4–8.2)
PROT UR STRIP.AUTO-MCNC: ABNORMAL MG/DL
Q ONSET: 220 MS
QRS COUNT: 18 BEATS
QRS DURATION: 80 MS
QT INTERVAL: 374 MS
QTC CALCULATION(BAZETT): 506 MS
QTC FREDERICIA: 458 MS
R AXIS: 83 DEGREES
RBC # BLD AUTO: 5.91 X10*6/UL (ref 4.5–5.9)
RBC # UR STRIP.AUTO: NEGATIVE /UL
RBC #/AREA URNS AUTO: NORMAL /HPF
RHEUMATOID FACT SER NEPH-ACNC: <10 IU/ML (ref 0–15)
SODIUM SERPL-SCNC: 139 MMOL/L (ref 136–145)
SP GR UR STRIP.AUTO: 1.02
T AXIS: 25 DEGREES
T OFFSET: 407 MS
TRIGL SERPL-MCNC: 133 MG/DL (ref 0–149)
URATE SERPL-MCNC: 6.3 MG/DL (ref 4–7.5)
UROBILINOGEN UR STRIP.AUTO-MCNC: NORMAL MG/DL
VENTRICULAR RATE: 110 BPM
VLDL: 27 MG/DL (ref 0–40)
WBC # BLD AUTO: 13.2 X10*3/UL (ref 4.4–11.3)
WBC #/AREA URNS AUTO: NORMAL /HPF

## 2025-01-26 PROCEDURE — G0378 HOSPITAL OBSERVATION PER HR: HCPCS

## 2025-01-26 PROCEDURE — 84484 ASSAY OF TROPONIN QUANT: CPT

## 2025-01-26 PROCEDURE — 36415 COLL VENOUS BLD VENIPUNCTURE: CPT

## 2025-01-26 PROCEDURE — 73630 X-RAY EXAM OF FOOT: CPT | Mod: LT

## 2025-01-26 PROCEDURE — 80349 CANNABINOIDS NATURAL: CPT

## 2025-01-26 PROCEDURE — 80053 COMPREHEN METABOLIC PANEL: CPT

## 2025-01-26 PROCEDURE — 71046 X-RAY EXAM CHEST 2 VIEWS: CPT

## 2025-01-26 PROCEDURE — 96374 THER/PROPH/DIAG INJ IV PUSH: CPT | Mod: 59

## 2025-01-26 PROCEDURE — 86431 RHEUMATOID FACTOR QUANT: CPT

## 2025-01-26 PROCEDURE — 2500000004 HC RX 250 GENERAL PHARMACY W/ HCPCS (ALT 636 FOR OP/ED)

## 2025-01-26 PROCEDURE — 20600 DRAIN/INJ JOINT/BURSA W/O US: CPT | Performed by: EMERGENCY MEDICINE

## 2025-01-26 PROCEDURE — 80365 DRUG SCREENING OXYCODONE: CPT

## 2025-01-26 PROCEDURE — 87070 CULTURE OTHR SPECIMN AEROBIC: CPT

## 2025-01-26 PROCEDURE — 85025 COMPLETE CBC W/AUTO DIFF WBC: CPT

## 2025-01-26 PROCEDURE — 82947 ASSAY GLUCOSE BLOOD QUANT: CPT

## 2025-01-26 PROCEDURE — 82077 ASSAY SPEC XCP UR&BREATH IA: CPT

## 2025-01-26 PROCEDURE — 81001 URINALYSIS AUTO W/SCOPE: CPT | Mod: CCI

## 2025-01-26 PROCEDURE — 2500000004 HC RX 250 GENERAL PHARMACY W/ HCPCS (ALT 636 FOR OP/ED): Mod: JZ

## 2025-01-26 PROCEDURE — 71046 X-RAY EXAM CHEST 2 VIEWS: CPT | Mod: FOREIGN READ | Performed by: RADIOLOGY

## 2025-01-26 PROCEDURE — 73630 X-RAY EXAM OF FOOT: CPT | Mod: RT

## 2025-01-26 PROCEDURE — 80061 LIPID PANEL: CPT

## 2025-01-26 PROCEDURE — 93005 ELECTROCARDIOGRAM TRACING: CPT

## 2025-01-26 PROCEDURE — 70450 CT HEAD/BRAIN W/O DYE: CPT | Performed by: RADIOLOGY

## 2025-01-26 PROCEDURE — 80307 DRUG TEST PRSMV CHEM ANLYZR: CPT

## 2025-01-26 PROCEDURE — 70450 CT HEAD/BRAIN W/O DYE: CPT

## 2025-01-26 PROCEDURE — 2500000001 HC RX 250 WO HCPCS SELF ADMINISTERED DRUGS (ALT 637 FOR MEDICARE OP)

## 2025-01-26 PROCEDURE — 99285 EMERGENCY DEPT VISIT HI MDM: CPT | Mod: 25 | Performed by: EMERGENCY MEDICINE

## 2025-01-26 PROCEDURE — 73630 X-RAY EXAM OF FOOT: CPT | Mod: RIGHT SIDE | Performed by: RADIOLOGY

## 2025-01-26 PROCEDURE — 86140 C-REACTIVE PROTEIN: CPT

## 2025-01-26 PROCEDURE — 96372 THER/PROPH/DIAG INJ SC/IM: CPT

## 2025-01-26 PROCEDURE — 85652 RBC SED RATE AUTOMATED: CPT

## 2025-01-26 PROCEDURE — 96375 TX/PRO/DX INJ NEW DRUG ADDON: CPT | Mod: 59

## 2025-01-26 PROCEDURE — 80353 DRUG SCREENING COCAINE: CPT

## 2025-01-26 PROCEDURE — 84550 ASSAY OF BLOOD/URIC ACID: CPT

## 2025-01-26 RX ORDER — METOPROLOL TARTRATE 1 MG/ML
5 INJECTION, SOLUTION INTRAVENOUS ONCE
Status: COMPLETED | OUTPATIENT
Start: 2025-01-26 | End: 2025-01-26

## 2025-01-26 RX ORDER — LORAZEPAM 0.5 MG/1
0.5 TABLET ORAL EVERY 2 HOUR PRN
Status: DISCONTINUED | OUTPATIENT
Start: 2025-01-26 | End: 2025-01-28

## 2025-01-26 RX ORDER — LANOLIN ALCOHOL/MO/W.PET/CERES
100 CREAM (GRAM) TOPICAL DAILY
Status: DISCONTINUED | OUTPATIENT
Start: 2025-01-30 | End: 2025-01-28 | Stop reason: HOSPADM

## 2025-01-26 RX ORDER — ALBUTEROL SULFATE 90 UG/1
2 INHALANT RESPIRATORY (INHALATION) EVERY 4 HOURS PRN
Status: ON HOLD | COMMUNITY
Start: 2024-09-12 | End: 2025-03-30

## 2025-01-26 RX ORDER — INSULIN LISPRO 100 [IU]/ML
0-5 INJECTION, SOLUTION INTRAVENOUS; SUBCUTANEOUS
Status: DISCONTINUED | OUTPATIENT
Start: 2025-01-26 | End: 2025-01-28 | Stop reason: HOSPADM

## 2025-01-26 RX ORDER — HYDRALAZINE HYDROCHLORIDE 100 MG/1
1 TABLET, FILM COATED ORAL 3 TIMES DAILY
Status: ON HOLD | COMMUNITY
End: 2025-01-26 | Stop reason: ENTERED-IN-ERROR

## 2025-01-26 RX ORDER — LORAZEPAM 0.5 MG/1
1 TABLET ORAL EVERY 2 HOUR PRN
Status: DISCONTINUED | OUTPATIENT
Start: 2025-01-26 | End: 2025-01-28

## 2025-01-26 RX ORDER — HYDRALAZINE HYDROCHLORIDE 25 MG/1
25 TABLET, FILM COATED ORAL 3 TIMES DAILY
Status: DISCONTINUED | OUTPATIENT
Start: 2025-01-26 | End: 2025-01-26

## 2025-01-26 RX ORDER — LIDOCAINE HYDROCHLORIDE 10 MG/ML
5 INJECTION, SOLUTION INFILTRATION; PERINEURAL ONCE
Status: COMPLETED | OUTPATIENT
Start: 2025-01-26 | End: 2025-01-26

## 2025-01-26 RX ORDER — ENOXAPARIN SODIUM 100 MG/ML
40 INJECTION SUBCUTANEOUS EVERY 24 HOURS
Status: DISCONTINUED | OUTPATIENT
Start: 2025-01-26 | End: 2025-01-28 | Stop reason: HOSPADM

## 2025-01-26 RX ORDER — ASPIRIN 325 MG
325 TABLET ORAL ONCE
Status: COMPLETED | OUTPATIENT
Start: 2025-01-26 | End: 2025-01-26

## 2025-01-26 RX ORDER — ALBUTEROL SULFATE 90 UG/1
2 INHALANT RESPIRATORY (INHALATION) EVERY 4 HOURS PRN
Status: DISCONTINUED | OUTPATIENT
Start: 2025-01-26 | End: 2025-01-28 | Stop reason: HOSPADM

## 2025-01-26 RX ORDER — IBUPROFEN 400 MG/1
800 TABLET ORAL ONCE
Status: COMPLETED | OUTPATIENT
Start: 2025-01-26 | End: 2025-01-26

## 2025-01-26 RX ORDER — LISINOPRIL 40 MG/1
1 TABLET ORAL DAILY
COMMUNITY
Start: 2024-09-24 | End: 2025-01-28 | Stop reason: HOSPADM

## 2025-01-26 RX ORDER — LANOLIN ALCOHOL/MO/W.PET/CERES
100 CREAM (GRAM) TOPICAL DAILY
Status: DISCONTINUED | OUTPATIENT
Start: 2025-01-26 | End: 2025-01-26

## 2025-01-26 RX ORDER — NAPROXEN SODIUM 220 MG/1
81 TABLET, FILM COATED ORAL
Status: DISCONTINUED | OUTPATIENT
Start: 2025-01-27 | End: 2025-01-28 | Stop reason: HOSPADM

## 2025-01-26 RX ORDER — LISINOPRIL 20 MG/1
40 TABLET ORAL DAILY
Status: DISCONTINUED | OUTPATIENT
Start: 2025-01-27 | End: 2025-01-26

## 2025-01-26 RX ORDER — DEXTROSE 50 % IN WATER (D50W) INTRAVENOUS SYRINGE
25
Status: DISCONTINUED | OUTPATIENT
Start: 2025-01-26 | End: 2025-01-28 | Stop reason: HOSPADM

## 2025-01-26 RX ORDER — PANTOPRAZOLE SODIUM 40 MG/1
40 TABLET, DELAYED RELEASE ORAL
Status: DISCONTINUED | OUTPATIENT
Start: 2025-01-27 | End: 2025-01-28 | Stop reason: HOSPADM

## 2025-01-26 RX ORDER — LISINOPRIL 20 MG/1
40 TABLET ORAL ONCE
Status: COMPLETED | OUTPATIENT
Start: 2025-01-26 | End: 2025-01-26

## 2025-01-26 RX ORDER — COLCHICINE 0.6 MG/1
0.6 TABLET ORAL DAILY
Status: DISCONTINUED | OUTPATIENT
Start: 2025-01-26 | End: 2025-01-28 | Stop reason: HOSPADM

## 2025-01-26 RX ORDER — HYDRALAZINE HYDROCHLORIDE 100 MG/1
100 TABLET, FILM COATED ORAL 3 TIMES DAILY
Status: DISCONTINUED | OUTPATIENT
Start: 2025-01-26 | End: 2025-01-26

## 2025-01-26 RX ORDER — THIAMINE HYDROCHLORIDE 100 MG/ML
100 INJECTION, SOLUTION INTRAMUSCULAR; INTRAVENOUS DAILY
Status: DISCONTINUED | OUTPATIENT
Start: 2025-01-27 | End: 2025-01-28 | Stop reason: HOSPADM

## 2025-01-26 RX ORDER — COLCHICINE 0.6 MG/1
0.6 TABLET ORAL
COMMUNITY
Start: 2025-01-20 | End: 2025-01-28 | Stop reason: HOSPADM

## 2025-01-26 RX ORDER — LORAZEPAM 0.5 MG/1
2 TABLET ORAL EVERY 2 HOUR PRN
Status: DISCONTINUED | OUTPATIENT
Start: 2025-01-26 | End: 2025-01-28

## 2025-01-26 RX ORDER — FOLIC ACID 1 MG/1
1 TABLET ORAL DAILY
Status: DISCONTINUED | OUTPATIENT
Start: 2025-01-26 | End: 2025-01-28 | Stop reason: HOSPADM

## 2025-01-26 RX ORDER — DEXTROSE 50 % IN WATER (D50W) INTRAVENOUS SYRINGE
12.5
Status: DISCONTINUED | OUTPATIENT
Start: 2025-01-26 | End: 2025-01-28 | Stop reason: HOSPADM

## 2025-01-26 RX ORDER — HYDRALAZINE HYDROCHLORIDE 25 MG/1
25 TABLET, FILM COATED ORAL 3 TIMES DAILY PRN
Status: DISCONTINUED | OUTPATIENT
Start: 2025-01-26 | End: 2025-01-28 | Stop reason: HOSPADM

## 2025-01-26 RX ORDER — ATORVASTATIN CALCIUM 80 MG/1
1 TABLET, FILM COATED ORAL NIGHTLY
Status: ON HOLD | COMMUNITY
Start: 2024-09-24 | End: 2025-01-26 | Stop reason: ENTERED-IN-ERROR

## 2025-01-26 RX ORDER — NIFEDIPINE 60 MG/1
60 TABLET, FILM COATED, EXTENDED RELEASE ORAL
Status: DISCONTINUED | OUTPATIENT
Start: 2025-01-26 | End: 2025-01-26

## 2025-01-26 RX ORDER — METOPROLOL TARTRATE 1 MG/ML
INJECTION, SOLUTION INTRAVENOUS
Status: COMPLETED
Start: 2025-01-26 | End: 2025-01-26

## 2025-01-26 RX ORDER — NIFEDIPINE 60 MG/1
60 TABLET, EXTENDED RELEASE ORAL
COMMUNITY
Start: 2024-12-30 | End: 2025-01-28 | Stop reason: HOSPADM

## 2025-01-26 RX ORDER — MULTIVIT-MIN/IRON FUM/FOLIC AC 7.5 MG-4
1 TABLET ORAL DAILY
Status: DISCONTINUED | OUTPATIENT
Start: 2025-01-26 | End: 2025-01-28 | Stop reason: HOSPADM

## 2025-01-26 RX ORDER — ACETAMINOPHEN 325 MG/1
975 TABLET ORAL EVERY 8 HOURS PRN
Status: DISCONTINUED | OUTPATIENT
Start: 2025-01-26 | End: 2025-01-28 | Stop reason: HOSPADM

## 2025-01-26 RX ORDER — NITROGLYCERIN 0.4 MG/1
0.4 TABLET SUBLINGUAL EVERY 5 MIN PRN
Status: DISCONTINUED | OUTPATIENT
Start: 2025-01-26 | End: 2025-01-28 | Stop reason: HOSPADM

## 2025-01-26 RX ORDER — NAPROXEN SODIUM 220 MG/1
81 TABLET, FILM COATED ORAL
Status: ON HOLD | COMMUNITY
Start: 2025-01-20 | End: 2025-01-26 | Stop reason: ENTERED-IN-ERROR

## 2025-01-26 RX ORDER — ATORVASTATIN CALCIUM 80 MG/1
80 TABLET, FILM COATED ORAL NIGHTLY
Status: DISCONTINUED | OUTPATIENT
Start: 2025-01-26 | End: 2025-01-28 | Stop reason: HOSPADM

## 2025-01-26 RX ORDER — LOSARTAN POTASSIUM 50 MG/1
50 TABLET ORAL DAILY
Status: DISCONTINUED | OUTPATIENT
Start: 2025-01-27 | End: 2025-01-28

## 2025-01-26 RX ADMIN — METOPROLOL TARTRATE 5 MG: 1 INJECTION, SOLUTION INTRAVENOUS at 10:46

## 2025-01-26 RX ADMIN — HYDROMORPHONE HYDROCHLORIDE 0.5 MG: 0.5 INJECTION, SOLUTION INTRAMUSCULAR; INTRAVENOUS; SUBCUTANEOUS at 10:36

## 2025-01-26 RX ADMIN — IBUPROFEN 800 MG: 400 TABLET ORAL at 09:49

## 2025-01-26 RX ADMIN — FOLIC ACID 1 MG: 1 TABLET ORAL at 16:13

## 2025-01-26 RX ADMIN — COLCHICINE 0.6 MG: 0.6 TABLET ORAL at 15:47

## 2025-01-26 RX ADMIN — ASPIRIN 325 MG ORAL TABLET 325 MG: 325 PILL ORAL at 10:36

## 2025-01-26 RX ADMIN — LISINOPRIL 40 MG: 20 TABLET ORAL at 10:26

## 2025-01-26 RX ADMIN — ATORVASTATIN CALCIUM 80 MG: 80 TABLET, FILM COATED ORAL at 20:32

## 2025-01-26 RX ADMIN — Medication 1 TABLET: at 16:13

## 2025-01-26 RX ADMIN — ENOXAPARIN SODIUM 40 MG: 40 INJECTION, SOLUTION SUBCUTANEOUS at 20:32

## 2025-01-26 RX ADMIN — THIAMINE HCL TAB 100 MG 100 MG: 100 TAB at 15:47

## 2025-01-26 RX ADMIN — ACETAMINOPHEN 975 MG: 325 TABLET ORAL at 19:26

## 2025-01-26 RX ADMIN — LIDOCAINE HYDROCHLORIDE 5 ML: 10 INJECTION, SOLUTION INFILTRATION; PERINEURAL at 11:53

## 2025-01-26 SDOH — SOCIAL STABILITY: SOCIAL INSECURITY
WITHIN THE LAST YEAR, HAVE YOU BEEN RAPED OR FORCED TO HAVE ANY KIND OF SEXUAL ACTIVITY BY YOUR PARTNER OR EX-PARTNER?: NO

## 2025-01-26 SDOH — HEALTH STABILITY: MENTAL HEALTH
DO YOU FEEL STRESS - TENSE, RESTLESS, NERVOUS, OR ANXIOUS, OR UNABLE TO SLEEP AT NIGHT BECAUSE YOUR MIND IS TROUBLED ALL THE TIME - THESE DAYS?: NOT AT ALL

## 2025-01-26 SDOH — SOCIAL STABILITY: SOCIAL INSECURITY: DO YOU FEEL ANYONE HAS EXPLOITED OR TAKEN ADVANTAGE OF YOU FINANCIALLY OR OF YOUR PERSONAL PROPERTY?: NO

## 2025-01-26 SDOH — SOCIAL STABILITY: SOCIAL INSECURITY: ABUSE: ADULT

## 2025-01-26 SDOH — SOCIAL STABILITY: SOCIAL NETWORK: HOW OFTEN DO YOU ATTEND CHURCH OR RELIGIOUS SERVICES?: PATIENT DECLINED

## 2025-01-26 SDOH — SOCIAL STABILITY: SOCIAL INSECURITY: WERE YOU ABLE TO COMPLETE ALL THE BEHAVIORAL HEALTH SCREENINGS?: YES

## 2025-01-26 SDOH — SOCIAL STABILITY: SOCIAL INSECURITY: WITHIN THE LAST YEAR, HAVE YOU BEEN HUMILIATED OR EMOTIONALLY ABUSED IN OTHER WAYS BY YOUR PARTNER OR EX-PARTNER?: NO

## 2025-01-26 SDOH — HEALTH STABILITY: PHYSICAL HEALTH
HOW OFTEN DO YOU NEED TO HAVE SOMEONE HELP YOU WHEN YOU READ INSTRUCTIONS, PAMPHLETS, OR OTHER WRITTEN MATERIAL FROM YOUR DOCTOR OR PHARMACY?: PATIENT DECLINES TO RESPOND

## 2025-01-26 SDOH — ECONOMIC STABILITY: INCOME INSECURITY: IN THE PAST 12 MONTHS HAS THE ELECTRIC, GAS, OIL, OR WATER COMPANY THREATENED TO SHUT OFF SERVICES IN YOUR HOME?: NO

## 2025-01-26 SDOH — SOCIAL STABILITY: SOCIAL INSECURITY: HAVE YOU HAD THOUGHTS OF HARMING ANYONE ELSE?: NO

## 2025-01-26 SDOH — ECONOMIC STABILITY: FOOD INSECURITY: WITHIN THE PAST 12 MONTHS, YOU WORRIED THAT YOUR FOOD WOULD RUN OUT BEFORE YOU GOT THE MONEY TO BUY MORE.: NEVER TRUE

## 2025-01-26 SDOH — SOCIAL STABILITY: SOCIAL INSECURITY: HAS ANYONE EVER THREATENED TO HURT YOUR FAMILY OR YOUR PETS?: NO

## 2025-01-26 SDOH — SOCIAL STABILITY: SOCIAL INSECURITY
WITHIN THE LAST YEAR, HAVE YOU BEEN KICKED, HIT, SLAPPED, OR OTHERWISE PHYSICALLY HURT BY YOUR PARTNER OR EX-PARTNER?: NO

## 2025-01-26 SDOH — ECONOMIC STABILITY: HOUSING INSECURITY: IN THE LAST 12 MONTHS, WAS THERE A TIME WHEN YOU WERE NOT ABLE TO PAY THE MORTGAGE OR RENT ON TIME?: NO

## 2025-01-26 SDOH — ECONOMIC STABILITY: TRANSPORTATION INSECURITY: IN THE PAST 12 MONTHS, HAS LACK OF TRANSPORTATION KEPT YOU FROM MEDICAL APPOINTMENTS OR FROM GETTING MEDICATIONS?: NO

## 2025-01-26 SDOH — SOCIAL STABILITY: SOCIAL INSECURITY: WITHIN THE LAST YEAR, HAVE YOU BEEN AFRAID OF YOUR PARTNER OR EX-PARTNER?: NO

## 2025-01-26 SDOH — ECONOMIC STABILITY: HOUSING INSECURITY: AT ANY TIME IN THE PAST 12 MONTHS, WERE YOU HOMELESS OR LIVING IN A SHELTER (INCLUDING NOW)?: NO

## 2025-01-26 SDOH — HEALTH STABILITY: PHYSICAL HEALTH: ON AVERAGE, HOW MANY MINUTES DO YOU ENGAGE IN EXERCISE AT THIS LEVEL?: PATIENT DECLINED

## 2025-01-26 SDOH — SOCIAL STABILITY: SOCIAL INSECURITY: DO YOU FEEL UNSAFE GOING BACK TO THE PLACE WHERE YOU ARE LIVING?: NO

## 2025-01-26 SDOH — SOCIAL STABILITY: SOCIAL INSECURITY: ARE THERE ANY APPARENT SIGNS OF INJURIES/BEHAVIORS THAT COULD BE RELATED TO ABUSE/NEGLECT?: NO

## 2025-01-26 SDOH — SOCIAL STABILITY: SOCIAL INSECURITY: ARE YOU MARRIED, WIDOWED, DIVORCED, SEPARATED, NEVER MARRIED, OR LIVING WITH A PARTNER?: DIVORCED

## 2025-01-26 SDOH — ECONOMIC STABILITY: FOOD INSECURITY: WITHIN THE PAST 12 MONTHS, THE FOOD YOU BOUGHT JUST DIDN'T LAST AND YOU DIDN'T HAVE MONEY TO GET MORE.: NEVER TRUE

## 2025-01-26 SDOH — SOCIAL STABILITY: SOCIAL NETWORK
IN A TYPICAL WEEK, HOW MANY TIMES DO YOU TALK ON THE PHONE WITH FAMILY, FRIENDS, OR NEIGHBORS?: MORE THAN THREE TIMES A WEEK

## 2025-01-26 SDOH — SOCIAL STABILITY: SOCIAL NETWORK
DO YOU BELONG TO ANY CLUBS OR ORGANIZATIONS SUCH AS CHURCH GROUPS, UNIONS, FRATERNAL OR ATHLETIC GROUPS, OR SCHOOL GROUPS?: PATIENT DECLINED

## 2025-01-26 SDOH — SOCIAL STABILITY: SOCIAL NETWORK: HOW OFTEN DO YOU ATTEND MEETINGS OF THE CLUBS OR ORGANIZATIONS YOU BELONG TO?: PATIENT DECLINED

## 2025-01-26 SDOH — ECONOMIC STABILITY: FOOD INSECURITY: HOW HARD IS IT FOR YOU TO PAY FOR THE VERY BASICS LIKE FOOD, HOUSING, MEDICAL CARE, AND HEATING?: NOT HARD AT ALL

## 2025-01-26 SDOH — SOCIAL STABILITY: SOCIAL INSECURITY: ARE YOU OR HAVE YOU BEEN THREATENED OR ABUSED PHYSICALLY, EMOTIONALLY, OR SEXUALLY BY ANYONE?: NO

## 2025-01-26 SDOH — SOCIAL STABILITY: SOCIAL NETWORK: HOW OFTEN DO YOU GET TOGETHER WITH FRIENDS OR RELATIVES?: MORE THAN THREE TIMES A WEEK

## 2025-01-26 SDOH — SOCIAL STABILITY: SOCIAL INSECURITY: HAVE YOU HAD ANY THOUGHTS OF HARMING ANYONE ELSE?: NO

## 2025-01-26 SDOH — HEALTH STABILITY: PHYSICAL HEALTH
ON AVERAGE, HOW MANY DAYS PER WEEK DO YOU ENGAGE IN MODERATE TO STRENUOUS EXERCISE (LIKE A BRISK WALK)?: PATIENT DECLINED

## 2025-01-26 SDOH — SOCIAL STABILITY: SOCIAL INSECURITY: DOES ANYONE TRY TO KEEP YOU FROM HAVING/CONTACTING OTHER FRIENDS OR DOING THINGS OUTSIDE YOUR HOME?: NO

## 2025-01-26 ASSESSMENT — LIFESTYLE VARIABLES
AUDITORY DISTURBANCES: NOT PRESENT
NAUSEA AND VOMITING: NO NAUSEA AND NO VOMITING
PAROXYSMAL SWEATS: NO SWEAT VISIBLE
AUDIT-C TOTAL SCORE: 3
HOW OFTEN DURING THE LAST YEAR HAVE YOU NEEDED AN ALCOHOLIC DRINK FIRST THING IN THE MORNING TO GET YOURSELF GOING AFTER A NIGHT OF HEAVY DRINKING: NEVER
SUBSTANCE_ABUSE_PAST_12_MONTHS: YES
AUDIT TOTAL SCORE: 3
PRESCIPTION_ABUSE_PAST_12_MONTHS: YES
VISUAL DISTURBANCES: NOT PRESENT
PAROXYSMAL SWEATS: NO SWEAT VISIBLE
NAUSEA AND VOMITING: NO NAUSEA AND NO VOMITING
TOTAL SCORE: 0
AUDITORY DISTURBANCES: NOT PRESENT
HOW OFTEN DO YOU HAVE 6 OR MORE DRINKS ON ONE OCCASION: NEVER
ANXIETY: NO ANXIETY, AT EASE
AGITATION: NORMAL ACTIVITY
PAROXYSMAL SWEATS: NO SWEAT VISIBLE
HAS A RELATIVE, FRIEND, DOCTOR, OR ANOTHER HEALTH PROFESSIONAL EXPRESSED CONCERN ABOUT YOUR DRINKING OR SUGGESTED YOU CUT DOWN: NO
SKIP TO QUESTIONS 9-10: 0
ANXIETY: NO ANXIETY, AT EASE
ORIENTATION AND CLOUDING OF SENSORIUM: ORIENTED AND CAN DO SERIAL ADDITIONS
AGITATION: NORMAL ACTIVITY
HAVE YOU OR SOMEONE ELSE BEEN INJURED AS A RESULT OF YOUR DRINKING: NO
VISUAL DISTURBANCES: NOT PRESENT
TREMOR: NO TREMOR
HOW OFTEN DURING THE LAST YEAR HAVE YOU FAILED TO DO WHAT WAS NORMALLY EXPECTED FROM YOU BECAUSE OF DRINKING: NEVER
VISUAL DISTURBANCES: NOT PRESENT
NAUSEA AND VOMITING: NO NAUSEA AND NO VOMITING
TOTAL SCORE: 0
NAUSEA AND VOMITING: NO NAUSEA AND NO VOMITING
ANXIETY: NO ANXIETY, AT EASE
TOTAL SCORE: 0
AGITATION: NORMAL ACTIVITY
AUDIT-C TOTAL SCORE: 3
HEADACHE, FULLNESS IN HEAD: NOT PRESENT
TREMOR: NO TREMOR
AUDITORY DISTURBANCES: NOT PRESENT
HOW OFTEN DO YOU HAVE A DRINK CONTAINING ALCOHOL: MONTHLY OR LESS
HEADACHE, FULLNESS IN HEAD: NOT PRESENT
TREMOR: NO TREMOR
AUDIT TOTAL SCORE: 0
ANXIETY: NO ANXIETY, AT EASE
HOW OFTEN DURING THE LAST YEAR HAVE YOU FOUND THAT YOU WERE NOT ABLE TO STOP DRINKING ONCE YOU HAD STARTED: NEVER
HEADACHE, FULLNESS IN HEAD: NOT PRESENT
NAUSEA AND VOMITING: NO NAUSEA AND NO VOMITING
AUDITORY DISTURBANCES: NOT PRESENT
HOW OFTEN DURING THE LAST YEAR HAVE YOU BEEN UNABLE TO REMEMBER WHAT HAPPENED THE NIGHT BEFORE BECAUSE YOU HAD BEEN DRINKING: NEVER
VISUAL DISTURBANCES: NOT PRESENT
HOW MANY STANDARD DRINKS CONTAINING ALCOHOL DO YOU HAVE ON A TYPICAL DAY: 5 OR 6
AGITATION: NORMAL ACTIVITY
ORIENTATION AND CLOUDING OF SENSORIUM: ORIENTED AND CAN DO SERIAL ADDITIONS
TREMOR: NO TREMOR
PAROXYSMAL SWEATS: NO SWEAT VISIBLE
VISUAL DISTURBANCES: NOT PRESENT
AUDITORY DISTURBANCES: NOT PRESENT
ORIENTATION AND CLOUDING OF SENSORIUM: ORIENTED AND CAN DO SERIAL ADDITIONS
TREMOR: NO TREMOR
PAROXYSMAL SWEATS: NO SWEAT VISIBLE
ANXIETY: NO ANXIETY, AT EASE
AGITATION: NORMAL ACTIVITY
ORIENTATION AND CLOUDING OF SENSORIUM: ORIENTED AND CAN DO SERIAL ADDITIONS
HOW OFTEN DURING THE LAST YEAR HAVE YOU HAD A FEELING OF GUILT OR REMORSE AFTER DRINKING: NEVER
HEADACHE, FULLNESS IN HEAD: NOT PRESENT
HEADACHE, FULLNESS IN HEAD: NOT PRESENT
ORIENTATION AND CLOUDING OF SENSORIUM: ORIENTED AND CAN DO SERIAL ADDITIONS

## 2025-01-26 ASSESSMENT — COGNITIVE AND FUNCTIONAL STATUS - GENERAL
CLIMB 3 TO 5 STEPS WITH RAILING: A LITTLE
WALKING IN HOSPITAL ROOM: A LITTLE
MOBILITY SCORE: 24
PATIENT BASELINE BEDBOUND: NO
DAILY ACTIVITIY SCORE: 24
MOBILITY SCORE: 22
DAILY ACTIVITIY SCORE: 24

## 2025-01-26 ASSESSMENT — ENCOUNTER SYMPTOMS
AGITATION: 0
SHORTNESS OF BREATH: 1
ACTIVITY CHANGE: 0
DYSURIA: 0
JOINT SWELLING: 1
PALPITATIONS: 0
CHILLS: 0
CHEST TIGHTNESS: 1

## 2025-01-26 ASSESSMENT — PAIN SCALES - GENERAL
PAINLEVEL_OUTOF10: 10 - WORST POSSIBLE PAIN
PAINLEVEL_OUTOF10: 8
PAINLEVEL_OUTOF10: 5 - MODERATE PAIN
PAINLEVEL_OUTOF10: 2

## 2025-01-26 ASSESSMENT — COLUMBIA-SUICIDE SEVERITY RATING SCALE - C-SSRS
2. HAVE YOU ACTUALLY HAD ANY THOUGHTS OF KILLING YOURSELF?: NO
1. IN THE PAST MONTH, HAVE YOU WISHED YOU WERE DEAD OR WISHED YOU COULD GO TO SLEEP AND NOT WAKE UP?: NO
6. HAVE YOU EVER DONE ANYTHING, STARTED TO DO ANYTHING, OR PREPARED TO DO ANYTHING TO END YOUR LIFE?: NO

## 2025-01-26 ASSESSMENT — ACTIVITIES OF DAILY LIVING (ADL)
HEARING - LEFT EAR: FUNCTIONAL
PATIENT'S MEMORY ADEQUATE TO SAFELY COMPLETE DAILY ACTIVITIES?: YES
LACK_OF_TRANSPORTATION: NO
BATHING: INDEPENDENT
ADEQUATE_TO_COMPLETE_ADL: YES
GROOMING: INDEPENDENT
JUDGMENT_ADEQUATE_SAFELY_COMPLETE_DAILY_ACTIVITIES: YES
WALKS IN HOME: INDEPENDENT
DRESSING YOURSELF: INDEPENDENT
FEEDING YOURSELF: INDEPENDENT
TOILETING: INDEPENDENT
LACK_OF_TRANSPORTATION: NO
HEARING - RIGHT EAR: FUNCTIONAL

## 2025-01-26 ASSESSMENT — PAIN DESCRIPTION - LOCATION
LOCATION: CHEST
LOCATION_2: FOOT

## 2025-01-26 ASSESSMENT — PAIN DESCRIPTION - PAIN TYPE: TYPE: ACUTE PAIN

## 2025-01-26 ASSESSMENT — HEART SCORE
ECG: NORMAL
AGE: <45
RISK FACTORS: >2 RISK FACTORS OR HX OF ATHEROSCLEROTIC DISEASE
HEART SCORE: 3
TROPONIN: 1-3 TIMES NORMAL LIMIT
HISTORY: SLIGHTLY SUSPICIOUS

## 2025-01-26 ASSESSMENT — PAIN - FUNCTIONAL ASSESSMENT
PAIN_FUNCTIONAL_ASSESSMENT: 0-10

## 2025-01-26 ASSESSMENT — PATIENT HEALTH QUESTIONNAIRE - PHQ9
1. LITTLE INTEREST OR PLEASURE IN DOING THINGS: NOT AT ALL
SUM OF ALL RESPONSES TO PHQ9 QUESTIONS 1 & 2: 0
2. FEELING DOWN, DEPRESSED OR HOPELESS: NOT AT ALL

## 2025-01-26 ASSESSMENT — PAIN DESCRIPTION - DESCRIPTORS: DESCRIPTORS: SHARP

## 2025-01-26 NOTE — PROGRESS NOTES
"Pharmacy Medication History Review    Theron Moran is a 44 y.o. male admitted for Acute chest pain. Pharmacy reviewed the patient's [id number] medications and allergies for accuracy.    Medications ADDED:  None   Medications CHANGED:  None   Medications REMOVED:   Aspirin 81 mg  Atorvastatin 80 mg  Hydralazine 100 mg     The list below reflects the updated PTA list.   Prior to Admission Medications   Prescriptions Last Dose Informant   NIFEdipine XL 60 mg 24 hr tablet  Self   Sig: Take 1 tablet (60 mg) by mouth once daily.   albuterol 90 mcg/actuation inhaler  Self   Sig: Inhale 2 puffs every 4 hours if needed.   colchicine 0.6 mg tablet  Self   Sig: Take 1 tablet (0.6 mg) by mouth once daily.   lisinopril 40 mg tablet  Self   Sig: Take 1 tablet (40 mg) by mouth once daily.      Facility-Administered Medications: None        The list below reflects the updated allergy list. Please review each documented allergy for additional clarification and justification.  Allergies  Reviewed by Sandy Munoz on 1/26/2025   No Known Allergies         Patient accepts M2B at discharge.     Sources:   [facility]  Pharmacy dispense history  Patient interview Moderate historian  Chart Review  Care Everywhere     Additional Comments:  Patient stated he is not really compliant with his medications. Patient stated that he takes lisinopril  and he's unsure of the other medications. Patient most recent fill history was used to help complete the Med[facility].  Patient request refill for Albuterol Inhaler.      Sandy Munoz  Pharmacy Technician  01/26/25     Secure Chat preferred   If no response call [phone] or [employer] \"Med Rec\"   "

## 2025-01-26 NOTE — ED PROCEDURE NOTE
Procedure  Arthrocentesis    Performed by: Ramon Lowe DO  Authorized by: Ramon Lowe DO    Consent:     Consent obtained:  Written and verbal    Consent given by:  Patient    Risks, benefits, and alternatives were discussed: yes      Risks discussed:  Bleeding, infection, pain and poor cosmetic result    Alternatives discussed:  No treatment and referral  Universal protocol:     Procedure explained and questions answered to patient or proxy's satisfaction: yes      Test results available: yes      Imaging studies available: yes      Patient identity confirmed:  Verbally with patient  Location:     Location:  Toe    Toe:  Great toe    Great toe joint:  L great MTP  Anesthesia:     Anesthesia method:  Local infiltration    Local anesthetic:  Lidocaine 1% w/o epi  Procedure details:     Needle gauge:  20 G    Ultrasound guidance: no      Approach:  Lateral    Aspirate characteristics:  Blood-tinged and serous    Steroid injected: no      Specimen collected: yes    Post-procedure details:     Dressing:  Gauze roll    Procedure completion:  Tolerated well, no immediate complications               Ramon Lowe DO  01/26/25 1221

## 2025-01-26 NOTE — H&P
History Of Present Illness  Theron Moran is a 44 y.o. male with PMHx significant for polysubstance use disorder, subarachnoid hemorrhage, mood disorder, HTN, diabetes, gout, SI and HI, and hypertensive emergency with flash pulmonary edema who presents to [facility] ED on 1/26/25 for chest pain and shortness of breath.     Patient has had multiple admissions related to chest pain, hypertension, and substance use. In the ED, patient was noted to be drowsy during exam, and without much ability to confirm home medications, past hospitalizations, and other history etc. He endorses tobacco and cocaine use, denies meth and alcohol, and states that he presented to  instead of [facility] this time based on his proximity at the time to .     He states this chest pain was 10/10 and in the center of his chest. This concern him enough to present to the emergency department. He now endorses a 7-8/10 chest pain.     He has had multiple representations to emergency departments for hypertensive emergency, and one presentation to the MICU after having flash pulmonary edema. Previous hospitalizations indicate his anti-hypertensive regimen included hydralazine 100mg TID, and nifedipine er 90mg. Notes also indicate a history of alcohol use. Plan to discuss medications and history with patient further in future.     Finally, he endorses that he wants to be full code for hospitalization. He is a [employer], and denies blood products.      Past Medical History  He has no past medical history on file.    Surgical History  He has no past surgical history on file.     Social History  He has no history on file for tobacco use, alcohol use, and drug use.    Family History  No family history on file.     Allergies  Patient has no known allergies.    Review of Systems   Constitutional:  Negative for activity change and chills.   HENT:  Negative for ear pain.    Respiratory:  Positive for chest tightness and shortness of breath.     Cardiovascular:  Positive for chest pain. Negative for palpitations and leg swelling.   Genitourinary:  Negative for dysuria.   Musculoskeletal:  Positive for joint swelling.   Psychiatric/Behavioral:  Negative for agitation and behavioral problems.      Physical Exam  Constitutional:       Appearance: Normal appearance.      Comments: Drowsy   Eyes:      Pupils: Pupils are equal, round, and reactive to light.   Cardiovascular:      Rate and Rhythm: Normal rate and regular rhythm.      Pulses: Normal pulses.      Heart sounds: Normal heart sounds. No murmur heard.  Pulmonary:      Effort: Pulmonary effort is normal.      Breath sounds: Normal breath sounds.      Comments: Faint expiratory wheezing noted on exam  Musculoskeletal:         General: Swelling present.      Right lower leg: No edema.      Left lower leg: No edema.      Comments: Left hallux swollen. Examined post-arthrocentesis   Skin:     General: Skin is warm and dry.   Neurological:      General: No focal deficit present.       Last Recorded Vitals  BP (!) 156/110   Pulse 83   Temp 36.6 °C (97.8 °F)   Resp 11   SpO2 98%     TTE 1/20/2018  1. The left ventricular systolic function is hyperdynamic with a 75-80% estimated ejection fraction.  2. Echocardiographic findings are consistent with hypertensive heart disease.  3. Spectral Doppler shows an impaired relaxation pattern of left ventricular diastolic filling.  4. There is severe concentric left ventricular hypertrophy.     EF 37% on last echo 1/18/25     Assessment/Plan   Theron Brito is a 43yo male with PMHx significant for polysubstance use disorder, subarachnoid hemorrhage, mood disorder, HTN, diabetes, gout, SI and HI, and hypertensive emergency with flash pulmonary edema who presents to [facility] ED on 1/26/25 for chest pain and shortness of breath.     During ED course, BP was 196/143, max 224, otherwise tachy to 106, AF. Labwork notable for normal CMP, mild leukocytosis to 13.2 otherwise  normal CBC, Trop 145 >156, Uric acid 6.3. EKG with ST and PACs, no obvious ischemic changes. He was given metoprolol 5mg IV and lisinopril 40mg x1,  as well as dilaudid afterwards pressures dropped to 120s. CXR negative, foot XR with osseous erosions and soft tissue swelling. Synovial fluid from toe sent off for culture.     Updates 01/26/25   - trending troponin to peak  - ordering CT Head   - UDS pending  - sending etoh level with alcohol history  - starting colchicine with gout flare  - starting losartan 50mg QD  - consider stress test vs cath during admission  - plan to start GDMT during admission as tolerated  - starting CIWA protocol with alcohol use history    #Chest Pain  #Troponinemia C/F Demand Ischemia  #Hypertensive Emergency   ::Troponin 1 145 -> 156  ::/143,  on presentation  :: lisinopril 40mg and metoprolol tartrate 5mg IV in ED  ::repeated /91  ::likely 2/2 cocaine use -> UDS pending  - trend troponin to peak  - starting losartan 50mg every day tomorrow  - plan to start GDMT as tolerated  - continue aspirin  - continue statin    #HFrEF  #HTN  ::Limited echo 1/18/25 EF 37%  ::previous TTE results above  :: Likely 2/2 cocaine use  ::no ischemic evaluation found in history  - consider cath vs stress test during admission  - continue to start GDMT as tolerated  - start hydralazine 25mg TID PRN for SBP > 180    #Alcohol Use  #C/F alcohol Withdrawal  ::endorses 4 drinks per day normally  - starting CIWA protocol  - starting thiamine    #Lethargy/AMS  ::Patient is lethargic on exam  ::normal neuro exam  ::potential 2/2 rapid bp correction vs opioids vs bleed  - CT Head Ordered  - Utox Ordered    #Bilateral Toe Pain  #Possible Gout > septic arthritis > inflammatory arthropathy  ::XR with mild degenerative changes of the first MTP and first IP joints with tiny associated osseous erosions   ::Uric Acid: 6.3   ::CRP: 1.68  PLAN   - s/p arthrocentesis in the ED  - Will sent off RF/CCP  given bilateral nature   - Tylenol PRN  - Colchicine 0.6mg daily    F: prn  E: PRN  N: cardiac   A: PIV    Orthodox -> no blood products    DVT: Lovenox  Code Status: Full  EC: Julio César Celestin (Relative) - [phone]     Curtis Lawrence DO, PGY-1

## 2025-01-26 NOTE — ED PROVIDER NOTES
Emergency Department Provider Note        History of Present Illness     History provided by: Patient  Limitations to History: None  External Records Reviewed with Brief Summary: Discharge Summary from 1/19/25 which showed admission for chest pain and SOB; had dysarthria and left sided arm and leg pain with NIH3, CTH showing right lacunar density changes concerning for lacunar infarct; CTA with multivessel narrowing, MRI showed no stroke thought due to HTN emergency; colchicine and home BP meds started    HPI:  Theron Moran is a 44 y.o. male with PMH of cocaine abuse, hypertensive emergency with flash pulmonary edema, gout,T2DM (A1C 6.9%), CHF EF 37% on last echo 1/18/25, here for chest pain and bilateral foot pain. Patient reports he had difficulty walking 2 days ago with pain, says he ran out of colchicine for a week. He reports this morning around 7 AM he had midsternal chest pain that does not radiate and not associated with any numbness or tingling. He reports chest pain occurred suddenly upon waking up. He reports intermittent pain not worsened with movement or palpation of chest. He reports smoking 2-3 cigs every other day and cocaine use 5 days ago. He reports difficulty ambulating due to the pain. Patient denies fever, productive cough, shortness of breath, back pain, abdominal pain, diarrhea, constipation, dysuria. Patient reports not taking meds today.    Physical Exam   Triage vitals:  T 36.3 °C (97.4 °F)  HR (!) 106  BP (!) 196/143  RR 18  O2 100 % None (Room air)    General: Awake, alert, in no acute distress  Eyes: Gaze conjugate.  No scleral icterus or injection  HENT: Normo-cephalic, atraumatic. No stridor  CV: Tachycardic rate, regular rhythm. Radial pulses 2+ bilaterally  Resp: Breathing non-labored, speaking in full sentences.  Clear to auscultation bilaterally  GI: Soft, non-distended, non-tender. No rebound or guarding.  MSK/Extremities: No gross bony deformities. Moving all extremities,  inflammation to left and right foot worsened at left toe with decreased sensation, pulses intact; decreased movement to toes on left side due to pain, full ROM of right foot  Skin: Warm. Appropriate color  Neuro: Alert. Oriented. Face symmetric. Speech is fluent.  Gross strength and sensation intact in b/l UE and LEs  Psych: Appropriate mood and affect    Medical Decision Making & ED Course   Medical Decision Makin y.o. male with PMH of cocaine abuse, hypertensive emergency with flash pulmonary edema, gout,T2DM (A1C 6.9%) here for chest pain and bilateral foot pain.     Lungs clear to auscultation bilaterally and chest x-ray ordered to rule out any signs of pneumonia, pneumothorax, widened mediastinum, or mass.  Additionally, patient has equal blood pressure and pulses in all 4 extremities, and given clinical picture, do not suspect aortic dissection.  No infectious symptoms, fever, leukocytosis, or IV drug use to suggest carditis. No productive cough or sputum to suggest a pneumonia or bronchitis.  Labs ordered. Meds given for symptomatic treatment. Patient started on BP meds. Patient reports cocaine use but around 5 days ago so less likely coronary vasospasms. Patient has had prior admissions for HTN emergency. Stress test on 24 negative for ischemia and EF 62% post stress test. Echo on 25 shows decreased at 37%, EF left ventricular hypertrophy, and Grade III left ventricular diastolic dysfunction. Bedside pocus shows severely decreased EF, no notable dissection. Patient does have elevated troponin, will trend. Patient given ASA and pain medication. Patient given oxygen for symptomatic shortness of breath with improvement in symptoms, patient reports improved chest pain.     Regarding patient's LE pain. Patient has pulses intact. He has decreased sensation which may be due to peripheral neuropathy from T2DM. He has no signs of open wounds. Less consistent with cellulitis, septic arthritis although  on differential. He also denies any falls less concerning for fracture. Clinical exam appears likely gout flare. Patient given meds for symptom control. Uric acid sent and was wnl. XR foot and inflammatory markers ordered. Discussed risk and benefits for joint aspiration and patient verbalized consent. Able to get small collection of fluid to send off for culture. Called lab to notify that we want to prioritize culture and they report they will let their team know. Patient admitted for gout flare, HTN emergency.    ----  Differential diagnoses considered include but are not limited to: ACS, PE, aortic dissection, pleuritis, cardiac tamponade, pneumothorax, musculoskeletal pain, costochondritis, HTN emergency, gout, cellulitis, septic arthritis     Social Determinants of Health which Significantly Impact Care: None identified     EKG Independent Interpretation: EKG interpreted by myself. Please see ED Course for full interpretation.    Independent Result Review and Interpretation: Relevant laboratory and radiographic results were reviewed and independently interpreted by myself.  As necessary, they are commented on in the ED Course.    Chronic conditions affecting the patient's care: As documented above in [facility]    The patient was discussed with the following consultants/services: Admission Coordinator who accepted the patient for admission    Care Considerations: As documented above in [facility]    ED Course:  ED Course as of 01/26/25 1203   Sun Jan 26, 2025   0958 I independently interpreted the CXR shows similar cardiomegaly. There is atelectasis to right base of lung. It is without evidence of pneumothorax, hemothorax, consolidation/pneumonia, pleural effusion.  [AT]   1006 Cxr read no acute changes [AT]   1006 I independently interpreted the EKG:  Sinus tachycardia. PACs. Normal axis.   Normal LA, QRS, and prolonged Qtc intervals.   No ST segment elevations, depressions, or T wave inversions.   [AT]   1033 ATTENDING  ATTESTATION  44-year-old man with multiple medical comorbidities most notable for history of chronic cocaine use disorder last used 5 days ago, history of hypertension with poor control gout, type 2 diabetes, heart failure presenting to the emergency department with 2 distinct complaints.  The patient complains of a central sternal chest pressure sensation he is slightly diaphoretic and appears uncomfortable his initial EKG was reassuring nonischemic with no dynamic changes from past EKGs on comparison, no signs of any atypical ACS, we will perform delta EKGs, move the patient into a space with more consistent telemetry monitoring.  His first troponin was elevated he was given oral aspirin.  A bedside ultrasound demonstrates no focal wall motion abnormalities, was uniformly depressed consistent with known heart failure no sign of acute fluid overload clinically.  Patient was hypertensive, no migration of the pain into the abdomen, seems well localized to the chest with low suspicion for aneurysm or dissection.  Patient has a secondary complaint of L great toe pain, appears edematous erythematous has a history of gout, tender to touch she has no fever or sign of systemic illness discomfort over the past couple of days his uric acid level was 0, discussed with the patient and consented him for arthrocentesis we will perform tap monitor vitals and consider preemptive antibiotic should the patient demonstrate septic illness.  Does not use IV substance.  I anticipate the patient will certainly need admission, we will continue to monitor his cardiac function    UPDATE 1146  Patient consented for and subsequently had successful arthrocentesis of the L great toe after a couple of attempts.  Scant bloody sanguinous fluid was appreciated sent for lab analysis most important Gram stain.  Did not appear overly purulent patient continues to not have fever or any infectious symptoms we will await results before preemptive  treatment considerations.   [facility] [RH]   1049 I independently interpreted the EKG:  Sinus tachycardia. Normal axis.   Normal DC, QRS, and Qtc intervals.   No ST segment elevations, depressions, or T wave inversions. [AT]   1200 Sed Rate(!): 20 [AT]      ED Course User Index  [AT] Katie Madrigal MD  [RH] Ramon Lowe DO         Diagnoses as of 01/26/25 1203   Acute chest pain   Hypertensive emergency   Acute gout involving toe of left foot, unspecified cause     Disposition   As a result of their workup, the patient will require admission to the hospital.  The patient was informed of his diagnosis.  The patient was given the opportunity to ask questions and I answered them. The patient agreed to be admitted to the hospital.    Procedures   Procedures    Patient seen and discussed with ED attending physician.    Katie Madrigal MD  Emergency Medicine     Katie Madrigal MD  Resident  01/26/25 1203

## 2025-01-26 NOTE — HOSPITAL COURSE
HPI:   45 y/o M with PMHx of cocaine abuse, hypertensive emergency with flash pulmonary edema, HFrEF (EF 37% 1/2025), HTN, T2DM (A1c 6.9%), DLD, SI/HI, and tobacco abuse presenting with a chief complaint of chest pain and bilateral foot pain. On arrival, patient was very lethargic and fell asleep frequently during interview so he was an unreliable historian. He reported bilateral great toe pain L>R he had been dealing with for an unclear amount of time, but denies that he had been taking any medications for this. In additional he also reports he woke up with midsternal chest pain that was 10/10 this morning and resolved (but during interview he was inconsistent and reported 8/10 pain at times despite falling asleep and laying in bed comfortably). He states he has never experienced similar pain in the past. He denies, fever, cough, wheezing, SOB, abdominal pain, N/V/D/C.     On arrival BP was 196/143, max 224, otherwise tachy to 106, AF. Labwork notable for normal CMP, mild leukocytosis to 13.2 otherwise normal CBC, Trop 145 >156, Uric acid 6.3. EKG with ST and PACs, no obvious ischemic changes. He was given metoprolol 5mg IV and lisinopril 40mg x1,  as well as dilaudid afterwards pressures dropped to 120s. CXR negative, foot XR with osseous erosions and soft tissue swelling. Synovial fluid from toe sent off for culture.      Notably patient was recently admitted to the neuro-stroke service at [facility] 1/17-1/19/2025 for chest pain, shortness of breath, and dysarthria, with leg pain. He was found to be hypertensive to 200s, treated with Lasix 20 and Lisinopril. CTH with new right lacunar density, CTA with multivessel narrowing with no LVO, MRI Brain negative. He was restarted on home BP meds, had colchicine restarted, and sent home with plan for outpatient BP control.      After arrival to the floor, troponins were trended to peak at 156 -> 143-> 127, we continued colchicine for the gout flare, started CIWA, and  held losartan with creatinine showing SONIDO. We then completed a TTE, which showed no ischemic concerns. Results will be put below.     TTE 1/27/25  1. The left ventricular systolic function is mildly decreased, with a Brown's biplane calculated ejection fraction of 47%.   2. Spectral Doppler shows a Grade III (restrictive pattern) of left ventricular diastolic filling with an elevated left atrial pressure.   3. There is severely increased septal and severely increased posterior left ventricular wall thickness.   4. There is severely increased concentric left ventricular hypertrophy.   5. There is normal right ventricular global systolic function.   6. Mildly enlarged right ventricle.   7. The left atrium is severely dilated.   8. The right atrium is moderately dilated.   9. Mildly elevated right ventricular systolic pressure.  10. Left Ventricular Global Longitudinal Strain - 6.5 %.    Without concern for ischemic changes and CIWA scores 0 throughout admission, we were planning on discharging on 1/28/25 with follow up to cardiology, and starting coreg 6.25mg BID. His creatinine improved from 1.52 to 1.36 today prior to discharge. However, patient started to have increased hypertensive symptoms and HR, potentially concerning for withdrawal.     *In early afternoon on 1/28/25, patient was not amenable to staying even with increased blood pressures and heart rate. Discussion was had with patient to stay to prevent adverse effects, for blood pressure control, and potential withdrawal prevention. He insisted on leaving and left AMA on 1/28/25.

## 2025-01-26 NOTE — ED TRIAGE NOTES
Patient arrives via EMS for CP and leg swelling. Patient states he has gout and that is why his leg is swelling. The CP started this morning and has not resolved so patient called EMS.

## 2025-01-26 NOTE — SIGNIFICANT EVENT
HPI:   43 y/o M with PMHx of cocaine abuse, hypertensive emergency with flash pulmonary edema, HFrEF (EF 37% 1/2025), HTN, T2DM (A1c 6.9%), DLD, SI/HI, and tobacco abuse presenting with a chief complaint of chest pain and bilateral foot pain. On arrival, patient was very lethargic and fell asleep frequently during interview so he was an unreliable historian. He reported bilateral great toe pain L>R he had been dealing with for an unclear amount of time, but denies that he had been taking any medications for this. In additional he also reports he woke up with midsternal chest pain that was 10/10 this morning and resolved (but during interview he was inconsistent and reported 8/10 pain at times despite falling asleep and laying in bed comfortably). He states he has never experienced similar pain in the past. He denies, fever, cough, wheezing, SOB, abdominal pain, N/V/D/C.    On arrival BP was 196/143, max 224, otherwise tachy to 106, AF. Labwork notable for normal CMP, mild leukocytosis to 13.2 otherwise normal CBC, Trop 145 >156, Uric acid 6.3. EKG with ST and PACs, no obvious ischemic changes. He was given metoprolol 5mg IV and lisinopril 40mg x1,  as well as dilaudid afterwards pressures dropped to 120s. CXR negative, foot XR with osseous erosions and soft tissue swelling. Synovial fluid from toe sent off for culture.     Notably patient was recently admitted to the neuro-stroke service at [facility] 1/17-1/19/2025 for chest pain, shortness of breath, and dysarthria, with leg pain. He was found to be hypertensive to 200s, treated with Lasix 20 and Lisinopril. CTH with new right lacunar density, CTA with multivessel narrowing with no LVO, MRI Brain negative. He was restarted on home BP meds, had colchicine restarted, and sent home with plan for outpatient BP control.      Vitals:   Visit Vitals  BP (!) 127/91   Pulse 80   Temp 36.6 °C (97.8 °F)   Resp 16   SpO2 96%      Physical Exam:   General: Lethargic but  comfortable appearing, falls asleep mid-interview multiple times, inconsistent historian  HEENT: PERRLA, EOMI, no scleral icterus or conjunctival pallow  Pulm: CTAB, no increased WOB on room air but slightly decreased breath sounds  Cards: RRR no M/R/G  Abdomen: Soft, NTD, BS+  Extremities: trace bilateral edema, bilateral great toes swollen but not warm L>R, L is s/p arthrocentesis draining dried serosanguinous fluid, covered with bandage  Neuro> A&Ox3 when able to arouse, CN grossly intact, 5/5 strength in bilateral upper and lower extremities    A/P:   45 y/o M with PMHx of cocaine abuse, hypertensive emergency with flash pulmonary edema (9/2024), HFrEF (EF 37% 1/2025), HTN, T2DM (A1c 6.9%), DLD,  tobacco abuse, SI/HI, with recent admission in 1/2025 for hypertensive emergency/CP/Stroke-like symptoms at [facility] last week presenting with a chief complaint of chest pain and bilateral foot pain. Vitals were notable for hypertension to 200s but at time of interview patient was in 120s and lethargic, and a poor historian to his own history of chest and foot pain. At this time, favor chest pain and troponinemia are type 2 in setting of uncontrolled hypertension due to noncompliance and cocaine use. Possibly withdrawal. Unlikely NSTEMI. Will plan to admit to observation with plan for gradual BP control. In addition he was lethargic at the time of interview, possibly due to opioid administration vs. Rapid BP correction. Will plan to evaluate with CTH. Uric acid is above goal at 6.3 for gout and will treat presumptively with colchicine, though erosions bilaterally may suggest other possible inflammatory arthropathy.    #Chest Pain  #Troponinemia   #Hypertensive Emergency   #Cocaine Abuse  ::Patient presents with recurrent chest pain, though is unable to be an accurate historian regarding quality, timing, previous episodes.  ::BP 200s on arrival, but dropped to 120s with IV metop and lisinopril 40 x1  ::Favor 2/2 hypertensive  emergency iso cocaine use, cannot rule out withdrawal, less likely ACS/dissection or infectious  ::Trop 145 -> 156  ::CXR WNL   PLAN   - Trend Troponin to peak   - EtOH level, CIWA   - Gradual BP control, will start Losartan 50 tomorrow and reintroduce others/GDMT gradually (on Lisinopril/Nifedipine/Hydral at home)  - Update lipid profile   - ASA/Statin   - Thrive consult this admission     #HFrEF (37%)  #HTN   ::TTE 1/18/2025: EF 37%, LVH, Grade III Diastology, left atrial cavity is severely dilated. Moderate TR.  ::Possibly NICM iso drug use, though in setting of other vascular risk factors, known intracranial atherosclerotic heart disease, must consider ischemic evaluation  PLAN   - Will consider ischemic evaluation, possibly cath vs. cMRI, though may also consider as outpatient - but follow-up will be a concern  - May titrate GDMT this admit, though caution with BB d/t cocaine use     #Lethargy/AMS  ::Patient is somnolent on exam, though is fully oriented, intermittently rousable, and with normal neuro exam  ::Favor 2/2 opioids vs. Rapid drop in BP > hypotensive ischemic injury, possibly toxic?  ::MRI Brain 1/18/2025: No acute intracranial abnormality but remote ischemic injuries noted, with multiple remote lacunar infacts  PLAN  - CTH ordered  - Utox ordered     #Bilateral Toe Pain  #Possible Gout > septic arthritis > inflammatory arthropathy  ::XR with mild degenerative changes of the first MTP and first IP joints with tiny associated osseous erosions   ::Uric Acid: 6.3   ::CRP: 1.68  PLAN   - s/p arthrocentesis in the ED  - Will sent off RF/CCP given bilateral nature   - Tylenol PRN  - Colchicine daily     F: prn  E: prn  N: cardiac  A: PIV    DVT: Lovenox  Code Status: Full  EC: Julio César Celestin (Relative) - [phone]    Hernan Morton MD  PGY-2, Internal Medicine

## 2025-01-26 NOTE — CARE PLAN
The patient's goals for the shift include Patient will remain safe free from falls and injury this shift    The clinical goals for the shift include Patient will be alert and oriented this shift    Over the shift, the patient arrived to [facility] from ED drowsey.  Patient alert and oriented when answering questions but had delayed responses and nurse needed to continually repeat the question and ask patient to stay awake for admission questions.  Patient given meds as ordered, started on CIWAS and repeat troponin, drug screen , UA/CS sent.  Patient CIWAS 0, BP elevated and prn Hydralazine ordered with parameters.  Patient sleeping between care and remained safe free from falls and injury this shift.    Note:  Left Great Toe (Gout) culture sent.  Dressing dry and intact, to be changed tomorrow 1/27/25      Problem: Skin  Goal: Decreased wound size/increased tissue granulation at next dressing change  Outcome: Progressing  Flowsheets (Taken 1/26/2025 1727)  Decreased wound size/increased tissue granulation at next dressing change:   Protective dressings over bony prominences   Promote sleep for wound healing  Goal: Participates in plan/prevention/treatment measures  Outcome: Progressing  Flowsheets (Taken 1/26/2025 1727)  Participates in plan/prevention/treatment measures:   Elevate heels   Increase activity/out of bed for meals  Goal: Prevent/manage excess moisture  Outcome: Progressing  Flowsheets (Taken 1/26/2025 1727)  Prevent/manage excess moisture:   Follow provider orders for dressing changes   Moisturize dry skin   Monitor for/manage infection if present  Goal: Prevent/minimize sheer/friction injuries  Outcome: Progressing  Flowsheets (Taken 1/26/2025 1727)  Prevent/minimize sheer/friction injuries:   HOB 30 degrees or less   Increase activity/out of bed for meals   Turn/reposition every 2 hours/use positioning/transfer devices  Goal: Promote/optimize nutrition  Outcome: Progressing  Flowsheets (Taken 1/26/2025  1727)  Promote/optimize nutrition:   Monitor/record intake including meals   Consume > 50% meals/supplements   Offer water/supplements/favorite foods  Goal: Promote skin healing  Outcome: Progressing  Flowsheets (Taken 1/26/2025 1727)  Promote skin healing:   Assess skin/pad under line(s)/device(s)   Ensure correct size (line/device) and apply per  instructions   Protective dressings over bony prominences   Rotate device position/do not position patient on device   Turn/reposition every 2 hours/use positioning/transfer devices     Problem: Respiratory  Goal: Clear secretions with interventions this shift  Outcome: Progressing  Goal: Minimize anxiety/maximize coping throughout shift  Outcome: Progressing  Goal: Minimal/no exertional discomfort or dyspnea this shift  Outcome: Progressing  Goal: No signs of respiratory distress (eg. Use of accessory muscles. Peds grunting)  Outcome: Progressing  Goal: Patent airway maintained this shift  Outcome: Progressing  Goal: Tolerate mechanical ventilation evidenced by VS/agitation level this shift  Outcome: Progressing  Goal: Tolerate pulmonary toileting this shift  Outcome: Progressing  Goal: Verbalize decreased shortness of breath this shift  Outcome: Progressing  Goal: Wean oxygen to maintain O2 saturation per order/standard this shift  Outcome: Progressing  Goal: Increase self care and/or family involvement in next 24 hours  Outcome: Progressing     Problem: ACS/CP/NSTEMI/STEMI  Goal: Chest pain managed (free from pain or at acceptable level)  Outcome: Progressing  Goal: Lab values return to normal range  Outcome: Progressing  Goal: Promote self management  Outcome: Progressing  Goal: Serial ECG will return to baseline  Outcome: Progressing  Goal: Verbalize understanding of procedures/devices  Outcome: Progressing  Goal: Wean vasopressors/achieve hemodynamic stability  Outcome: Progressing     Problem: Hypertensive Emergency/Crisis  Goal: Blood pressure  gradually reduced to goal range  Outcome: Progressing  Goal: Free from signs of organ damage  Outcome: Progressing  Goal: Lab values return to normal range  Outcome: Progressing  Goal: Promote self management  Outcome: Progressing     Problem: Fall/Injury  Goal: Not fall by end of shift  Outcome: Progressing  Goal: Be free from injury by end of the shift  Outcome: Progressing  Goal: Verbalize understanding of personal risk factors for fall in the hospital  Outcome: Progressing  Goal: Verbalize understanding of risk factor reduction measures to prevent injury from fall in the home  Outcome: Progressing  Goal: Use assistive devices by end of the shift  Outcome: Progressing  Goal: Pace activities to prevent fatigue by end of the shift  Outcome: Progressing     Problem: Pain  Goal: Takes deep breaths with improved pain control throughout the shift  Outcome: Progressing  Goal: Turns in bed with improved pain control throughout the shift  Outcome: Progressing  Goal: Walks with improved pain control throughout the shift  Outcome: Progressing  Goal: Performs ADL's with improved pain control throughout shift  Outcome: Progressing  Goal: Participates in PT with improved pain control throughout the shift  Outcome: Progressing  Goal: Free from opioid side effects throughout the shift  Outcome: Progressing  Goal: Free from acute confusion related to pain meds throughout the shift  Outcome: Progressing

## 2025-01-27 ENCOUNTER — APPOINTMENT (OUTPATIENT)
Dept: CARDIOLOGY | Facility: HOSPITAL | Age: 45
End: 2025-01-27
Payer: COMMERCIAL

## 2025-01-27 LAB
ALBUMIN SERPL BCP-MCNC: 3.5 G/DL (ref 3.4–5)
ALP SERPL-CCNC: 45 U/L (ref 33–120)
ALT SERPL W P-5'-P-CCNC: 28 U/L (ref 10–52)
ANION GAP SERPL CALC-SCNC: 15 MMOL/L (ref 10–20)
AORTIC VALVE PEAK VELOCITY: 1.4 M/S
AST SERPL W P-5'-P-CCNC: 22 U/L (ref 9–39)
AV PEAK GRADIENT: 8 MMHG
AVA (PEAK VEL): 2.67 CM2
BASOPHILS # BLD AUTO: 0.09 X10*3/UL (ref 0–0.1)
BASOPHILS NFR BLD AUTO: 0.8 %
BILIRUB SERPL-MCNC: 0.4 MG/DL (ref 0–1.2)
BUN SERPL-MCNC: 43 MG/DL (ref 6–23)
CALCIUM SERPL-MCNC: 8.6 MG/DL (ref 8.6–10.6)
CCP IGG SERPL-ACNC: <1 U/ML
CHLORIDE SERPL-SCNC: 105 MMOL/L (ref 98–107)
CHLORIDE UR-SCNC: 28 MMOL/L
CHLORIDE/CREATININE (MMOL/G) IN URINE: 18 MMOL/G CREAT (ref 23–275)
CO2 SERPL-SCNC: 24 MMOL/L (ref 21–32)
CREAT SERPL-MCNC: 1.52 MG/DL (ref 0.5–1.3)
CREAT UR-MCNC: 156.2 MG/DL (ref 20–370)
EGFRCR SERPLBLD CKD-EPI 2021: 58 ML/MIN/1.73M*2
EJECTION FRACTION APICAL 4 CHAMBER: 43.7
EJECTION FRACTION: 47 %
EOSINOPHIL # BLD AUTO: 0.29 X10*3/UL (ref 0–0.7)
EOSINOPHIL NFR BLD AUTO: 2.7 %
ERYTHROCYTE [DISTWIDTH] IN BLOOD BY AUTOMATED COUNT: 14.4 % (ref 11.5–14.5)
GLOBAL LONGITUDINAL STRAIN: 6.5 %
GLUCOSE BLD MANUAL STRIP-MCNC: 157 MG/DL (ref 74–99)
GLUCOSE BLD MANUAL STRIP-MCNC: 169 MG/DL (ref 74–99)
GLUCOSE BLD MANUAL STRIP-MCNC: 186 MG/DL (ref 74–99)
GLUCOSE SERPL-MCNC: 127 MG/DL (ref 74–99)
HCT VFR BLD AUTO: 47.8 % (ref 41–52)
HGB BLD-MCNC: 15 G/DL (ref 13.5–17.5)
HOLD SPECIMEN: NORMAL
IMM GRANULOCYTES # BLD AUTO: 0.05 X10*3/UL (ref 0–0.7)
IMM GRANULOCYTES NFR BLD AUTO: 0.5 % (ref 0–0.9)
LEFT ATRIUM VOLUME AREA LENGTH INDEX BSA: 49.5 ML/M2
LEFT VENTRICLE INTERNAL DIMENSION DIASTOLE: 4.52 CM (ref 3.5–6)
LEFT VENTRICULAR OUTFLOW TRACT DIAMETER: 1.9 CM
LYMPHOCYTES # BLD AUTO: 2.46 X10*3/UL (ref 1.2–4.8)
LYMPHOCYTES NFR BLD AUTO: 23.2 %
MAGNESIUM SERPL-MCNC: 2.19 MG/DL (ref 1.6–2.4)
MCH RBC QN AUTO: 25.6 PG (ref 26–34)
MCHC RBC AUTO-ENTMCNC: 31.4 G/DL (ref 32–36)
MCV RBC AUTO: 81 FL (ref 80–100)
MITRAL VALVE E/A RATIO: 2.7
MONOCYTES # BLD AUTO: 1.26 X10*3/UL (ref 0.1–1)
MONOCYTES NFR BLD AUTO: 11.9 %
NEUTROPHILS # BLD AUTO: 6.46 X10*3/UL (ref 1.2–7.7)
NEUTROPHILS NFR BLD AUTO: 60.9 %
NRBC BLD-RTO: 0 /100 WBCS (ref 0–0)
PLATELET # BLD AUTO: 221 X10*3/UL (ref 150–450)
POTASSIUM SERPL-SCNC: 3.5 MMOL/L (ref 3.5–5.3)
POTASSIUM UR-SCNC: 40 MMOL/L
POTASSIUM/CREAT UR-RTO: 26 MMOL/G CREAT
PROT SERPL-MCNC: 6.3 G/DL (ref 6.4–8.2)
RBC # BLD AUTO: 5.87 X10*6/UL (ref 4.5–5.9)
RIGHT VENTRICLE FREE WALL PEAK S': 14.57 CM/S
RIGHT VENTRICLE PEAK SYSTOLIC PRESSURE: 41.5 MMHG
SODIUM SERPL-SCNC: 140 MMOL/L (ref 136–145)
SODIUM UR-SCNC: 51 MMOL/L
SODIUM/CREAT UR-RTO: 33 MMOL/G CREAT
TRICUSPID ANNULAR PLANE SYSTOLIC EXCURSION: 2.3 CM
WBC # BLD AUTO: 10.6 X10*3/UL (ref 4.4–11.3)

## 2025-01-27 PROCEDURE — 36415 COLL VENOUS BLD VENIPUNCTURE: CPT

## 2025-01-27 PROCEDURE — 93306 TTE W/DOPPLER COMPLETE: CPT | Performed by: INTERNAL MEDICINE

## 2025-01-27 PROCEDURE — 96372 THER/PROPH/DIAG INJ SC/IM: CPT

## 2025-01-27 PROCEDURE — 82436 ASSAY OF URINE CHLORIDE: CPT

## 2025-01-27 PROCEDURE — 2500000002 HC RX 250 W HCPCS SELF ADMINISTERED DRUGS (ALT 637 FOR MEDICARE OP, ALT 636 FOR OP/ED)

## 2025-01-27 PROCEDURE — G0378 HOSPITAL OBSERVATION PER HR: HCPCS

## 2025-01-27 PROCEDURE — 2500000001 HC RX 250 WO HCPCS SELF ADMINISTERED DRUGS (ALT 637 FOR MEDICARE OP)

## 2025-01-27 PROCEDURE — 93356 MYOCRD STRAIN IMG SPCKL TRCK: CPT | Performed by: INTERNAL MEDICINE

## 2025-01-27 PROCEDURE — 85025 COMPLETE CBC W/AUTO DIFF WBC: CPT

## 2025-01-27 PROCEDURE — 93356 MYOCRD STRAIN IMG SPCKL TRCK: CPT

## 2025-01-27 PROCEDURE — 83735 ASSAY OF MAGNESIUM: CPT

## 2025-01-27 PROCEDURE — 82947 ASSAY GLUCOSE BLOOD QUANT: CPT | Mod: 59

## 2025-01-27 PROCEDURE — 86200 CCP ANTIBODY: CPT

## 2025-01-27 PROCEDURE — 99223 1ST HOSP IP/OBS HIGH 75: CPT

## 2025-01-27 PROCEDURE — 82947 ASSAY GLUCOSE BLOOD QUANT: CPT

## 2025-01-27 PROCEDURE — 2500000004 HC RX 250 GENERAL PHARMACY W/ HCPCS (ALT 636 FOR OP/ED)

## 2025-01-27 PROCEDURE — 80053 COMPREHEN METABOLIC PANEL: CPT

## 2025-01-27 PROCEDURE — 96375 TX/PRO/DX INJ NEW DRUG ADDON: CPT | Mod: 59

## 2025-01-27 RX ORDER — POTASSIUM CHLORIDE 20 MEQ/1
40 TABLET, EXTENDED RELEASE ORAL ONCE
Status: COMPLETED | OUTPATIENT
Start: 2025-01-27 | End: 2025-01-27

## 2025-01-27 RX ADMIN — LOSARTAN POTASSIUM 50 MG: 50 TABLET, FILM COATED ORAL at 08:26

## 2025-01-27 RX ADMIN — POTASSIUM CHLORIDE 40 MEQ: 1500 TABLET, EXTENDED RELEASE ORAL at 08:28

## 2025-01-27 RX ADMIN — ACETAMINOPHEN 975 MG: 325 TABLET ORAL at 19:31

## 2025-01-27 RX ADMIN — THIAMINE HYDROCHLORIDE 100 MG: 100 INJECTION, SOLUTION INTRAMUSCULAR; INTRAVENOUS at 08:27

## 2025-01-27 RX ADMIN — Medication 1 TABLET: at 08:27

## 2025-01-27 RX ADMIN — INSULIN LISPRO 1 UNITS: 100 INJECTION, SOLUTION INTRAVENOUS; SUBCUTANEOUS at 13:36

## 2025-01-27 RX ADMIN — ATORVASTATIN CALCIUM 80 MG: 80 TABLET, FILM COATED ORAL at 20:27

## 2025-01-27 RX ADMIN — FOLIC ACID 1 MG: 1 TABLET ORAL at 08:26

## 2025-01-27 RX ADMIN — COLCHICINE 0.6 MG: 0.6 TABLET ORAL at 08:26

## 2025-01-27 RX ADMIN — PANTOPRAZOLE SODIUM 40 MG: 40 TABLET, DELAYED RELEASE ORAL at 08:27

## 2025-01-27 RX ADMIN — ASPIRIN 81 MG CHEWABLE TABLET 81 MG: 81 TABLET CHEWABLE at 08:27

## 2025-01-27 RX ADMIN — INSULIN LISPRO 1 UNITS: 100 INJECTION, SOLUTION INTRAVENOUS; SUBCUTANEOUS at 08:27

## 2025-01-27 RX ADMIN — ENOXAPARIN SODIUM 40 MG: 40 INJECTION, SOLUTION SUBCUTANEOUS at 13:33

## 2025-01-27 ASSESSMENT — LIFESTYLE VARIABLES
AUDITORY DISTURBANCES: NOT PRESENT
AGITATION: NORMAL ACTIVITY
AUDITORY DISTURBANCES: NOT PRESENT
HEADACHE, FULLNESS IN HEAD: NOT PRESENT
VISUAL DISTURBANCES: NOT PRESENT
AUDITORY DISTURBANCES: NOT PRESENT
NAUSEA AND VOMITING: NO NAUSEA AND NO VOMITING
VISUAL DISTURBANCES: NOT PRESENT
PAROXYSMAL SWEATS: NO SWEAT VISIBLE
VISUAL DISTURBANCES: NOT PRESENT
NAUSEA AND VOMITING: NO NAUSEA AND NO VOMITING
TREMOR: NO TREMOR
ANXIETY: NO ANXIETY, AT EASE
NAUSEA AND VOMITING: NO NAUSEA AND NO VOMITING
HEADACHE, FULLNESS IN HEAD: NOT PRESENT
TOTAL SCORE: 0
AGITATION: NORMAL ACTIVITY
ANXIETY: NO ANXIETY, AT EASE
TOTAL SCORE: 0
NAUSEA AND VOMITING: NO NAUSEA AND NO VOMITING
VISUAL DISTURBANCES: NOT PRESENT
AUDITORY DISTURBANCES: NOT PRESENT
HEADACHE, FULLNESS IN HEAD: NOT PRESENT
ORIENTATION AND CLOUDING OF SENSORIUM: ORIENTED AND CAN DO SERIAL ADDITIONS
AGITATION: NORMAL ACTIVITY
TREMOR: NO TREMOR
TOTAL SCORE: 0
PAROXYSMAL SWEATS: NO SWEAT VISIBLE
ORIENTATION AND CLOUDING OF SENSORIUM: ORIENTED AND CAN DO SERIAL ADDITIONS
VISUAL DISTURBANCES: NOT PRESENT
HEADACHE, FULLNESS IN HEAD: NOT PRESENT
TREMOR: NO TREMOR
ORIENTATION AND CLOUDING OF SENSORIUM: ORIENTED AND CAN DO SERIAL ADDITIONS
ANXIETY: NO ANXIETY, AT EASE
AGITATION: NORMAL ACTIVITY
VISUAL DISTURBANCES: NOT PRESENT
AGITATION: NORMAL ACTIVITY
PAROXYSMAL SWEATS: NO SWEAT VISIBLE
TOTAL SCORE: 0
AGITATION: NORMAL ACTIVITY
AUDITORY DISTURBANCES: NOT PRESENT
VISUAL DISTURBANCES: NOT PRESENT
AGITATION: NORMAL ACTIVITY
TREMOR: NO TREMOR
ORIENTATION AND CLOUDING OF SENSORIUM: ORIENTED AND CAN DO SERIAL ADDITIONS
AUDITORY DISTURBANCES: NOT PRESENT
HEADACHE, FULLNESS IN HEAD: NOT PRESENT
TOTAL SCORE: 0
HEADACHE, FULLNESS IN HEAD: NOT PRESENT
ANXIETY: NO ANXIETY, AT EASE
ORIENTATION AND CLOUDING OF SENSORIUM: ORIENTED AND CAN DO SERIAL ADDITIONS
PAROXYSMAL SWEATS: NO SWEAT VISIBLE
VISUAL DISTURBANCES: NOT PRESENT
HEADACHE, FULLNESS IN HEAD: NOT PRESENT
AUDITORY DISTURBANCES: NOT PRESENT
HEADACHE, FULLNESS IN HEAD: NOT PRESENT
ANXIETY: NO ANXIETY, AT EASE
TREMOR: NO TREMOR
AUDITORY DISTURBANCES: NOT PRESENT
AGITATION: NORMAL ACTIVITY
ANXIETY: NO ANXIETY, AT EASE
ORIENTATION AND CLOUDING OF SENSORIUM: ORIENTED AND CAN DO SERIAL ADDITIONS
PAROXYSMAL SWEATS: NO SWEAT VISIBLE
PAROXYSMAL SWEATS: NO SWEAT VISIBLE
ANXIETY: NO ANXIETY, AT EASE
HEADACHE, FULLNESS IN HEAD: NOT PRESENT
AUDITORY DISTURBANCES: NOT PRESENT
VISUAL DISTURBANCES: NOT PRESENT
TREMOR: NO TREMOR
TOTAL SCORE: 0
PAROXYSMAL SWEATS: NO SWEAT VISIBLE
PAROXYSMAL SWEATS: NO SWEAT VISIBLE
TREMOR: NO TREMOR
ORIENTATION AND CLOUDING OF SENSORIUM: ORIENTED AND CAN DO SERIAL ADDITIONS
ORIENTATION AND CLOUDING OF SENSORIUM: ORIENTED AND CAN DO SERIAL ADDITIONS
NAUSEA AND VOMITING: NO NAUSEA AND NO VOMITING
NAUSEA AND VOMITING: NO NAUSEA AND NO VOMITING
AGITATION: NORMAL ACTIVITY
NAUSEA AND VOMITING: NO NAUSEA AND NO VOMITING
ANXIETY: NO ANXIETY, AT EASE
ANXIETY: NO ANXIETY, AT EASE
NAUSEA AND VOMITING: NO NAUSEA AND NO VOMITING
NAUSEA AND VOMITING: NO NAUSEA AND NO VOMITING
TOTAL SCORE: 0
PAROXYSMAL SWEATS: NO SWEAT VISIBLE
ORIENTATION AND CLOUDING OF SENSORIUM: ORIENTED AND CAN DO SERIAL ADDITIONS
TREMOR: NO TREMOR
TREMOR: NO TREMOR
TOTAL SCORE: 0
TOTAL SCORE: 0

## 2025-01-27 ASSESSMENT — PAIN DESCRIPTION - LOCATION: LOCATION: TOE (COMMENT WHICH ONE)

## 2025-01-27 ASSESSMENT — ACTIVITIES OF DAILY LIVING (ADL)
LACK_OF_TRANSPORTATION: NO
LACK_OF_TRANSPORTATION: NO

## 2025-01-27 ASSESSMENT — PAIN SCALES - GENERAL: PAINLEVEL_OUTOF10: 6

## 2025-01-27 NOTE — PROGRESS NOTES
Theron Moran is a 44 y.o. male on day 0 of admission presenting with Acute chest pain.    Subjective   NAEO. Today, patient was stable and denying chest pain. He endorses left hallux pain and states this is making it difficult for him to walk.     Objective     Last Recorded Vitals  BP (!) 135/94   Pulse 87   Temp 36.3 °C (97.3 °F)   Resp 19   Wt 86.5 kg (190 lb 11.2 oz)   SpO2 94%   Intake/Output last 3 Shifts:    Intake/Output Summary (Last 24 hours) at 1/27/2025 0818  Last data filed at 1/26/2025 1955  Gross per 24 hour   Intake 1060 ml   Output --   Net 1060 ml       Admission Weight  Weight: 86.5 kg (190 lb 11.2 oz) (01/26/25 1523)    Daily Weight  01/26/25 : 86.5 kg (190 lb 11.2 oz)    Image Results  CT head wo IV contrast  Narrative: Interpreted By:  Curtis King and Ritchie Brandon   STUDY:  CT HEAD WO IV CONTRAST;  1/26/2025 8:01 pm      INDICATION:  Signs/Symptoms:lethargy, ams. Per chart review, CT head at [facility] on  01/18/2025 showing hypodensity the right lacunar region prompting MRI  brain which showed remote infarct.      COMPARISON:  CT head 01/28/2018.      ACCESSION NUMBER(S):  [id number]      ORDERING CLINICIAN:  LEIDY SOMMERS      TECHNIQUE:  Noncontrast axial CT scan of head was performed. Angled reformats in  brain and bone windows were generated. The images were reviewed in  bone, brain, blood and soft tissue windows.      FINDINGS:  There are areas of encephalomalacia/gliosis noted along the inferior  frontal lobes and anterior inferior left temporal lobe in a location  suggesting sequelae of previous more remote posttraumatic contusions.      There are nonspecific white matter changes noted within cerebral  hemispheres bilaterally most pronounced bifrontally left right  greater than left.      Focal hypodensities are identified within the basal ganglia  bilaterally.      The ventricular system is nondilated.      No hyperdense acute intracranial hemorrhage is noted.      There is  no midline shift.      Atherosclerotic calcification noted along the carotid siphons.      The paranasal sinuses and mastoid air cells are clear.      Impression: There are areas of encephalomalacia/gliosis noted along the inferior  frontal lobes and anterior inferior left temporal lobe in a location  suggesting sequelae of previous more remote posttraumatic contusions.      There are nonspecific white matter changes noted within cerebral  hemispheres bilaterally most pronounced bifrontally left right  greater than left. Focal hypodensities are identified within the  basal ganglia bilaterally.          I personally reviewed the images/study and I agree with the findings  as stated by resident Bradley Torrez. This study was interpreted  at [facility], [place], [place].      MACRO:  None          Signed by: Curtis King 1/27/2025 4:02 AM  Dictation workstation:   [id number]      Physical Exam    TTE 1/20/2018  1. The left ventricular systolic function is hyperdynamic with a 75-80% estimated ejection fraction.  2. Echocardiographic findings are consistent with hypertensive heart disease.  3. Spectral Doppler shows an impaired relaxation pattern of left ventricular diastolic filling.  4. There is severe concentric left ventricular hypertrophy.      EF 37% on last echo 1/18/25     Assessment/Plan   Theron Brito is a 43yo male with PMHx significant for polysubstance use disorder, subarachnoid hemorrhage, mood disorder, HTN, diabetes, gout, SI and HI, and hypertensive emergency with flash pulmonary edema who presents to [facility] ED on 1/26/25 for chest pain and shortness of breath.     Updates 01/27/25   - troponin peaked at 156 -> 143 -> 127  - UDS positive for cannabis and cocaine  - continuing colchicine with gout flare  - holding losartan with SONIDO  - PRN hydralazine for SBP > 180  - complete TTE today  - potential CT Coronary if TTE shows ischemic concerns  - plan to start GDMT  during admission as tolerated  - continuing CIWA protocol with alcohol use history    #Chest Pain  #Troponinemia C/F Demand Ischemia  #Hypertensive Emergency   ::Troponin 1 145 -> 156 -> 132 -> 127  ::/143,  on presentation  ::lisinopril 40mg and metoprolol tartrate 5mg IV in ED  ::UDS positive for cocaine  - holding losartan with SONIDO  - start GDMT as tolerated in future   - favor coreg for BB in future  - continue aspirin  - continue statin     #HFrEF  #HTN  ::Limited echo 1/18/25 EF 37%  ::previous TTE results above  :: Likely 2/2 cocaine use  ::no ischemic evaluation found in history  - complete TTE today  - coronary CT if TTE shows ischemic concerns  - continue to start GDMT as tolerated  - continue hydralazine 25mg TID PRN for SBP > 180     #Alcohol Use  #C/F alcohol Withdrawal  ::endorses 4 drinks per day normally  - continue CIWA protocol  - continue thiamine    #SONIDO  ::baseline creatinine 1.1 -> 1.52 today  ::potentially 2/2 hypertensive emergency and BP correction vs dehydration  - oral hydration today   - holding losartan  - ctm     #Lethargy/AMS  ::Patient is lethargic on exam  ::normal neuro exam  ::potential 2/2 rapid bp correction vs opioids vs bleed  -CTM     #Bilateral Toe Pain  #Possible Gout > septic arthritis > inflammatory arthropathy  ::XR with mild degenerative changes of the first MTP and first IP joints with tiny associated osseous erosions   ::Uric Acid: 6.3   ::CRP: 1.68  PLAN   - Tylenol PRN  - Colchicine 0.6mg daily     F: prn  E: PRN  N: cardiac   A: PIV     Advent -> no blood products     DVT: Lovenox  Code Status: Full  EC: Julio César Celestin (Relative) - [phone]     Curtis Lawrence DO

## 2025-01-27 NOTE — PROGRESS NOTES
01/27/25 1300   Discharge Planning   Living Arrangements Alone   Support Systems Family members   Type of Residence Private residence   Number of Stairs to Enter Residence 5   Number of Stairs Within Residence 20   Do you have animals or pets at home? No   Who is requesting discharge planning? Provider   Home or Post Acute Services None   Expected Discharge Disposition Home   Financial Resource Strain   How hard is it for you to pay for the very basics like food, housing, medical care, and heating? Not very   Housing Stability   In the last 12 months, was there a time when you were not able to pay the mortgage or rent on time? N   In the past 12 months, how many times have you moved where you were living? 1   At any time in the past 12 months, were you homeless or living in a shelter (including now)? N   Transportation Needs   In the past 12 months, has lack of transportation kept you from medical appointments or from getting medications? no   In the past 12 months, has lack of transportation kept you from meetings, work, or from getting things needed for daily living? No     SW met with the patient to discuss resources available for substance abuse. Patient shared he is open to receiving support and agreeable to SW making a referral to [facility]. SW contacted [facility] at [phone] and press #6 and had to leave a message. SW provided information for the patient and requested a call back confirming they received referral and will see him. Will continue to follow.   ATUL Mayer  1/27/25@14:00

## 2025-01-27 NOTE — PROGRESS NOTES
01/27/25 0935   Discharge Planning   Living Arrangements Alone   Support Systems Family members   Assistance Needed none   Type of Residence Private residence   Number of Stairs to Enter Residence 5   Number of Stairs Within Residence 20   Do you have animals or pets at home? No   Who is requesting discharge planning? Provider   Home or Post Acute Services None   Expected Discharge Disposition Home   Does the patient need discharge transport arranged? No   Financial Resource Strain   How hard is it for you to pay for the very basics like food, housing, medical care, and heating? Not very   Housing Stability   In the last 12 months, was there a time when you were not able to pay the mortgage or rent on time? N   In the past 12 months, how many times have you moved where you were living? 1   At any time in the past 12 months, were you homeless or living in a shelter (including now)? N   Transportation Needs   In the past 12 months, has lack of transportation kept you from medical appointments or from getting medications? no   In the past 12 months, has lack of transportation kept you from meetings, work, or from getting things needed for daily living? No   Patient Choice   Provider Choice list and CMS website (https://medicare.gov/care-compare#search) for post-acute Quality and Resource Measure Data were provided and reviewed with: Patient   Patient / Family choosing to utilize agency / facility established prior to hospitalization No   Stroke Family Assessment   Stroke Family Assessment Needed No     Transitional Care Coordination Progress Note:  Patient discussed during interdisciplinary rounds.   Team members present: ZENAIDA HARDIN MD   Plan per Medical/Surgical team: Monitoring BP Possible  ischemic evaluation, possibly cath vs. cMRI   Payor: [employer]   Discharge disposition: Home   Potential Barriers: None   ADOD: 1-      Previous Home Care: None   DME: None  Falls: None   PCP:  POONAM AMOS   Dialysis:  None

## 2025-01-28 ENCOUNTER — PHARMACY VISIT (OUTPATIENT)
Dept: PHARMACY | Facility: CLINIC | Age: 45
End: 2025-01-28
Payer: COMMERCIAL

## 2025-01-28 VITALS
DIASTOLIC BLOOD PRESSURE: 137 MMHG | RESPIRATION RATE: 18 BRPM | HEART RATE: 111 BPM | OXYGEN SATURATION: 98 % | WEIGHT: 198.63 LBS | BODY MASS INDEX: 31.18 KG/M2 | HEIGHT: 67 IN | SYSTOLIC BLOOD PRESSURE: 197 MMHG | TEMPERATURE: 98.4 F

## 2025-01-28 LAB
6MAM UR CFM-MCNC: <25 NG/ML
ALBUMIN SERPL BCP-MCNC: 3.8 G/DL (ref 3.4–5)
ALP SERPL-CCNC: 43 U/L (ref 33–120)
ALT SERPL W P-5'-P-CCNC: 24 U/L (ref 10–52)
ANION GAP SERPL CALC-SCNC: 12 MMOL/L (ref 10–20)
AST SERPL W P-5'-P-CCNC: 20 U/L (ref 9–39)
BASOPHILS # BLD AUTO: 0.12 X10*3/UL (ref 0–0.1)
BASOPHILS NFR BLD AUTO: 1 %
BILIRUB SERPL-MCNC: 0.4 MG/DL (ref 0–1.2)
BUN SERPL-MCNC: 31 MG/DL (ref 6–23)
CALCIUM SERPL-MCNC: 9.1 MG/DL (ref 8.6–10.6)
CHLORIDE SERPL-SCNC: 109 MMOL/L (ref 98–107)
CO2 SERPL-SCNC: 23 MMOL/L (ref 21–32)
CODEINE UR CFM-MCNC: <50 NG/ML
CREAT SERPL-MCNC: 1.36 MG/DL (ref 0.5–1.3)
EGFRCR SERPLBLD CKD-EPI 2021: 66 ML/MIN/1.73M*2
EOSINOPHIL # BLD AUTO: 0.36 X10*3/UL (ref 0–0.7)
EOSINOPHIL NFR BLD AUTO: 3.1 %
ERYTHROCYTE [DISTWIDTH] IN BLOOD BY AUTOMATED COUNT: 14.6 % (ref 11.5–14.5)
GLUCOSE BLD MANUAL STRIP-MCNC: 141 MG/DL (ref 74–99)
GLUCOSE BLD MANUAL STRIP-MCNC: 162 MG/DL (ref 74–99)
GLUCOSE SERPL-MCNC: 112 MG/DL (ref 74–99)
HCT VFR BLD AUTO: 49.4 % (ref 41–52)
HGB BLD-MCNC: 15 G/DL (ref 13.5–17.5)
HYDROCODONE CTO UR CFM-MCNC: <25 NG/ML
HYDROMORPHONE UR CFM-MCNC: 565 NG/ML
IMM GRANULOCYTES # BLD AUTO: 0.06 X10*3/UL (ref 0–0.7)
IMM GRANULOCYTES NFR BLD AUTO: 0.5 % (ref 0–0.9)
LYMPHOCYTES # BLD AUTO: 2.74 X10*3/UL (ref 1.2–4.8)
LYMPHOCYTES NFR BLD AUTO: 23.3 %
MAGNESIUM SERPL-MCNC: 2.06 MG/DL (ref 1.6–2.4)
MCH RBC QN AUTO: 26 PG (ref 26–34)
MCHC RBC AUTO-ENTMCNC: 30.4 G/DL (ref 32–36)
MCV RBC AUTO: 86 FL (ref 80–100)
MONOCYTES # BLD AUTO: 1.23 X10*3/UL (ref 0.1–1)
MONOCYTES NFR BLD AUTO: 10.4 %
MORPHINE UR CFM-MCNC: <50 NG/ML
NEUTROPHILS # BLD AUTO: 7.27 X10*3/UL (ref 1.2–7.7)
NEUTROPHILS NFR BLD AUTO: 61.7 %
NORHYDROCODONE UR CFM-MCNC: <25 NG/ML
NOROXYCODONE UR CFM-MCNC: <25 NG/ML
NRBC BLD-RTO: 0 /100 WBCS (ref 0–0)
OXYCODONE UR CFM-MCNC: <25 NG/ML
OXYMORPHONE UR CFM-MCNC: <25 NG/ML
PLATELET # BLD AUTO: 193 X10*3/UL (ref 150–450)
POTASSIUM SERPL-SCNC: 4.2 MMOL/L (ref 3.5–5.3)
PROT SERPL-MCNC: 6.9 G/DL (ref 6.4–8.2)
RBC # BLD AUTO: 5.78 X10*6/UL (ref 4.5–5.9)
SODIUM SERPL-SCNC: 140 MMOL/L (ref 136–145)
WBC # BLD AUTO: 11.8 X10*3/UL (ref 4.4–11.3)

## 2025-01-28 PROCEDURE — 80053 COMPREHEN METABOLIC PANEL: CPT

## 2025-01-28 PROCEDURE — 85025 COMPLETE CBC W/AUTO DIFF WBC: CPT

## 2025-01-28 PROCEDURE — G0378 HOSPITAL OBSERVATION PER HR: HCPCS

## 2025-01-28 PROCEDURE — 99239 HOSP IP/OBS DSCHRG MGMT >30: CPT

## 2025-01-28 PROCEDURE — 82947 ASSAY GLUCOSE BLOOD QUANT: CPT

## 2025-01-28 PROCEDURE — 97161 PT EVAL LOW COMPLEX 20 MIN: CPT | Mod: GP

## 2025-01-28 PROCEDURE — 2500000004 HC RX 250 GENERAL PHARMACY W/ HCPCS (ALT 636 FOR OP/ED)

## 2025-01-28 PROCEDURE — 96376 TX/PRO/DX INJ SAME DRUG ADON: CPT

## 2025-01-28 PROCEDURE — 36415 COLL VENOUS BLD VENIPUNCTURE: CPT

## 2025-01-28 PROCEDURE — 2500000001 HC RX 250 WO HCPCS SELF ADMINISTERED DRUGS (ALT 637 FOR MEDICARE OP)

## 2025-01-28 PROCEDURE — 97165 OT EVAL LOW COMPLEX 30 MIN: CPT | Mod: GO

## 2025-01-28 PROCEDURE — RXMED WILLOW AMBULATORY MEDICATION CHARGE

## 2025-01-28 PROCEDURE — 83735 ASSAY OF MAGNESIUM: CPT

## 2025-01-28 RX ORDER — LORAZEPAM 0.5 MG/1
2 TABLET ORAL EVERY 2 HOUR PRN
Status: DISCONTINUED | OUTPATIENT
Start: 2025-01-28 | End: 2025-01-28 | Stop reason: HOSPADM

## 2025-01-28 RX ORDER — LORAZEPAM 0.5 MG/1
1 TABLET ORAL EVERY 2 HOUR PRN
Status: DISCONTINUED | OUTPATIENT
Start: 2025-01-28 | End: 2025-01-28 | Stop reason: HOSPADM

## 2025-01-28 RX ORDER — NITROGLYCERIN 0.4 MG/1
0.4 TABLET SUBLINGUAL EVERY 5 MIN PRN
Qty: 75 TABLET | Refills: 0 | Status: ON HOLD | OUTPATIENT
Start: 2025-01-28 | End: 2025-02-27

## 2025-01-28 RX ORDER — LOSARTAN POTASSIUM 25 MG/1
25 TABLET ORAL DAILY
Status: DISCONTINUED | OUTPATIENT
Start: 2025-01-28 | End: 2025-01-28 | Stop reason: HOSPADM

## 2025-01-28 RX ORDER — ATORVASTATIN CALCIUM 80 MG/1
80 TABLET, FILM COATED ORAL NIGHTLY
Qty: 30 TABLET | Refills: 0 | Status: ON HOLD | OUTPATIENT
Start: 2025-01-28 | End: 2025-02-27

## 2025-01-28 RX ORDER — LANOLIN ALCOHOL/MO/W.PET/CERES
100 CREAM (GRAM) TOPICAL DAILY
Qty: 30 TABLET | Refills: 0 | Status: ON HOLD | OUTPATIENT
Start: 2025-01-30 | End: 2025-03-01

## 2025-01-28 RX ORDER — MULTIVIT-MIN/IRON FUM/FOLIC AC 7.5 MG-4
1 TABLET ORAL DAILY
Qty: 30 TABLET | Refills: 0 | Status: ON HOLD | OUTPATIENT
Start: 2025-01-29 | End: 2025-02-28

## 2025-01-28 RX ORDER — LORAZEPAM 0.5 MG/1
0.5 TABLET ORAL EVERY 2 HOUR PRN
Status: DISCONTINUED | OUTPATIENT
Start: 2025-01-28 | End: 2025-01-28 | Stop reason: HOSPADM

## 2025-01-28 RX ORDER — CARVEDILOL 6.25 MG/1
6.25 TABLET ORAL 2 TIMES DAILY
Qty: 60 TABLET | Refills: 0 | Status: ON HOLD | OUTPATIENT
Start: 2025-01-28 | End: 2025-02-27

## 2025-01-28 RX ORDER — LOSARTAN POTASSIUM 25 MG/1
25 TABLET ORAL DAILY
Qty: 30 TABLET | Refills: 0 | Status: ON HOLD | OUTPATIENT
Start: 2025-01-28 | End: 2025-02-27

## 2025-01-28 RX ORDER — NAPROXEN SODIUM 220 MG/1
81 TABLET, FILM COATED ORAL
Qty: 30 TABLET | Refills: 0 | Status: ON HOLD | OUTPATIENT
Start: 2025-01-28 | End: 2025-02-27

## 2025-01-28 RX ORDER — CARVEDILOL 6.25 MG/1
6.25 TABLET ORAL 2 TIMES DAILY
Status: DISCONTINUED | OUTPATIENT
Start: 2025-01-28 | End: 2025-01-28 | Stop reason: HOSPADM

## 2025-01-28 RX ORDER — FOLIC ACID 1 MG/1
1 TABLET ORAL DAILY
Qty: 30 TABLET | Refills: 0 | Status: ON HOLD | OUTPATIENT
Start: 2025-01-29 | End: 2025-02-28

## 2025-01-28 RX ORDER — COLCHICINE 0.6 MG/1
0.6 TABLET ORAL DAILY
Qty: 30 TABLET | Refills: 0 | Status: ON HOLD | OUTPATIENT
Start: 2025-01-29 | End: 2025-02-28

## 2025-01-28 RX ADMIN — COLCHICINE 0.6 MG: 0.6 TABLET ORAL at 08:03

## 2025-01-28 RX ADMIN — Medication 1 TABLET: at 08:03

## 2025-01-28 RX ADMIN — FOLIC ACID 1 MG: 1 TABLET ORAL at 08:03

## 2025-01-28 RX ADMIN — ASPIRIN 81 MG CHEWABLE TABLET 81 MG: 81 TABLET CHEWABLE at 06:51

## 2025-01-28 RX ADMIN — ACETAMINOPHEN 975 MG: 325 TABLET ORAL at 08:03

## 2025-01-28 RX ADMIN — LOSARTAN POTASSIUM 25 MG: 25 TABLET, FILM COATED ORAL at 09:00

## 2025-01-28 RX ADMIN — CARVEDILOL 6.25 MG: 6.25 TABLET, FILM COATED ORAL at 08:03

## 2025-01-28 RX ADMIN — PANTOPRAZOLE SODIUM 40 MG: 40 TABLET, DELAYED RELEASE ORAL at 06:51

## 2025-01-28 RX ADMIN — HYDRALAZINE HYDROCHLORIDE 25 MG: 25 TABLET ORAL at 13:19

## 2025-01-28 RX ADMIN — THIAMINE HYDROCHLORIDE 100 MG: 100 INJECTION, SOLUTION INTRAMUSCULAR; INTRAVENOUS at 08:03

## 2025-01-28 ASSESSMENT — COGNITIVE AND FUNCTIONAL STATUS - GENERAL
CLIMB 3 TO 5 STEPS WITH RAILING: A LITTLE
DAILY ACTIVITIY SCORE: 23
HELP NEEDED FOR BATHING: A LITTLE
MOBILITY SCORE: 18
MOVING FROM LYING ON BACK TO SITTING ON SIDE OF FLAT BED WITH BEDRAILS: A LITTLE
MOVING TO AND FROM BED TO CHAIR: A LITTLE
TURNING FROM BACK TO SIDE WHILE IN FLAT BAD: A LITTLE
WALKING IN HOSPITAL ROOM: A LITTLE
STANDING UP FROM CHAIR USING ARMS: A LITTLE

## 2025-01-28 ASSESSMENT — LIFESTYLE VARIABLES
ORIENTATION AND CLOUDING OF SENSORIUM: ORIENTED AND CAN DO SERIAL ADDITIONS
TOTAL SCORE: 0
AGITATION: NORMAL ACTIVITY
NAUSEA AND VOMITING: NO NAUSEA AND NO VOMITING
VISUAL DISTURBANCES: NOT PRESENT
ORIENTATION AND CLOUDING OF SENSORIUM: ORIENTED AND CAN DO SERIAL ADDITIONS
ANXIETY: NO ANXIETY, AT EASE
NAUSEA AND VOMITING: NO NAUSEA AND NO VOMITING
ANXIETY: NO ANXIETY, AT EASE
VISUAL DISTURBANCES: NOT PRESENT
HEADACHE, FULLNESS IN HEAD: NOT PRESENT
AUDITORY DISTURBANCES: NOT PRESENT
ANXIETY: NO ANXIETY, AT EASE
ORIENTATION AND CLOUDING OF SENSORIUM: ORIENTED AND CAN DO SERIAL ADDITIONS
AUDITORY DISTURBANCES: NOT PRESENT
TOTAL SCORE: 0
PAROXYSMAL SWEATS: NO SWEAT VISIBLE
AUDITORY DISTURBANCES: NOT PRESENT
HEADACHE, FULLNESS IN HEAD: NOT PRESENT
PAROXYSMAL SWEATS: NO SWEAT VISIBLE
TREMOR: NO TREMOR
PAROXYSMAL SWEATS: NO SWEAT VISIBLE
TREMOR: NO TREMOR
TREMOR: NO TREMOR
AGITATION: NORMAL ACTIVITY
TOTAL SCORE: 0
VISUAL DISTURBANCES: NOT PRESENT
NAUSEA AND VOMITING: NO NAUSEA AND NO VOMITING
AGITATION: NORMAL ACTIVITY
HEADACHE, FULLNESS IN HEAD: NOT PRESENT

## 2025-01-28 ASSESSMENT — PAIN - FUNCTIONAL ASSESSMENT
PAIN_FUNCTIONAL_ASSESSMENT: 0-10
PAIN_FUNCTIONAL_ASSESSMENT: 0-10

## 2025-01-28 ASSESSMENT — ACTIVITIES OF DAILY LIVING (ADL)
BATHING_ASSISTANCE: STAND BY
ADL_ASSISTANCE: INDEPENDENT
ADL_ASSISTANCE: INDEPENDENT

## 2025-01-28 ASSESSMENT — PAIN SCALES - GENERAL
PAINLEVEL_OUTOF10: 0 - NO PAIN
PAINLEVEL_OUTOF10: 0 - NO PAIN

## 2025-01-28 NOTE — PROGRESS NOTES
Physical Therapy    Physical Therapy Evaluation    Patient Name: Theron Moran  MRN: [id number]  Department: [place] [address]  Room: [address] [place] [address]  Today's Date: 1/28/2025   Time Calculation  Start Time: 1001  Stop Time: 1017  Time Calculation (min): 16 min    Assessment/Plan   PT Assessment  PT Assessment Results: Decreased endurance, Impaired balance, Decreased mobility  Rehab Prognosis: Good  Barriers to Discharge Home: No anticipated barriers  Evaluation/Treatment Tolerance: Patient tolerated treatment well  Medical Staff Made Aware: Yes  End of Session Communication: Bedside nurse  Assessment Comment: Patient is a 43yo M presenting with chest pain. Patient is indep at baseline, lives with wife. Patient uses a cane. SBA for mobility, dc rec no needs- pt declines any home PT  End of Session Patient Position: Bed, 3 rail up (sitting EOB)  IP OR SWING BED PT PLAN  Inpatient or Swing Bed: Inpatient  PT Plan  Treatment/Interventions: Bed mobility, Transfer training, Gait training, Stair training, Neuromuscular re-education, Balance training, Strengthening, Endurance training, Range of motion, Therapeutic activity, Therapeutic exercise  PT Plan: Ongoing PT  PT Frequency: 2 times per week  PT Discharge Recommendations: No PT needed after discharge  Equipment Recommended upon Discharge: Wheeled walker  PT Recommended Transfer Status: Stand by assist  PT - OK to Discharge: Yes (indicates PT eval complete and dc rec determined)    Subjective   General Visit Information:  General  Reason for Referral: chest pain, SOB  Past Medical History Relevant to Rehab: polysubstance use disorder, subarachnoid hemorrhage, mood disorder, HTN, diabetes, gout, SI and HI, and hypertensive emergency with flash pulmonary edema  Co-Treatment: OT  Co-Treatment Reason: maximize pt safety and participation  Prior to Session Communication: Bedside nurse  Patient Position Received: Bed, 3 rail up  General Comment: pt supine in bed, RN cleared. pt  cooperative  Home Living:  Home Living  Type of Home: House  Lives With: Spouse  Home Adaptive Equipment: Cane  Home Layout: One level  Home Access: Level entry  Bathroom Shower/Tub: Walk-in shower  Bathroom Equipment: None  Prior Level of Function:  Prior Function Per Pt/Caregiver Report  Level of [place]: Independent with ADLs and functional transfers, Independent with homemaking with ambulation  ADL Assistance: Independent  Homemaking Assistance: Independent  Ambulatory Assistance: Independent (cane occasionally)  Prior Function Comments: + drive, - work  Precautions:  Precautions  Medical Precautions: Fall precautions  Precautions Comment: WA      Date/Time Vitals Session Patient Position Pulse Resp SpO2 BP MAP (mmHg)    01/28/25 1001 During PT  Sitting  --  --  --  165/120  --     01/28/25 1002 During PT  Sitting  --  --  --  163/146  --              Objective   Pain:  Pain Assessment  Pain Assessment: 0-10  0-10 (Numeric) Pain Score: 0 - No pain  Cognition:  Cognition  Overall Cognitive Status: Within Functional Limits  Orientation Level: Oriented X4    General Assessments:     Activity Tolerance  Endurance: Tolerates 10 - 20 min exercise with multiple rests    Sensation  Light Touch:  (denies)       Functional Assessments:  Bed Mobility  Bed Mobility: Yes  Bed Mobility 1  Bed Mobility 1: Supine to sitting  Level of Assistance 1:  (SBA)  Bed Mobility Comments 1: HOB elevated slightly    Transfers  Transfer: Yes  Transfer 1  Transfer From 1: Sit to, Stand to  Transfer to 1: Stand, Sit  Technique 1: Sit to stand, Stand to sit  Transfer Level of Assistance 1:  (SBA)  Trials/Comments 1: stood from EOB    Ambulation/Gait Training  Ambulation/Gait Training Performed: Yes  Ambulation/Gait Training 1  Surface 1: Level tile  Device 1: No device  Assistance 1:  (SBA)  Quality of Gait 1: Decreased step length, Shuffling gait, Wide base of support  Comments/Distance (ft) 1: pt ambulated 12' x2 to  bathroom  Extremity/Trunk Assessments:  RLE   RLE : Within Functional Limits  LLE   LLE : Within Functional Limits  Outcome Measures:  [facility] Basic Mobility  Turning from your back to your side while in a flat bed without using bedrails: A little  Moving from lying on your back to sitting on the side of a flat bed without using bedrails: A little  Moving to and from bed to chair (including a wheelchair): A little  Standing up from a chair using your arms (e.g. wheelchair or bedside chair): A little  To walk in hospital room: A little  Climbing 3-5 steps with railing: A little  Basic Mobility - Total Score: 18    Encounter Problems       Encounter Problems (Active)       Balance       tinetti score > 24 to indicate low risk for falls  (Progressing)       Start:  01/28/25    Expected End:  02/11/25               Mobility       STG - Patient will ambulate > 250' with LRAD modif indep  (Progressing)       Start:  01/28/25    Expected End:  02/11/25               PT Transfers       STG - Patient will perform bed mobility indep (Progressing)       Start:  01/28/25    Expected End:  02/11/25            STG - Patient will transfer sit to and from stand LRAD modif indep  (Progressing)       Start:  01/28/25    Expected End:  02/11/25                   Education Documentation  Precautions, taught by Jeanette Byrnes PT at 1/28/2025 11:58 AM.  Learner: Patient  Readiness: Acceptance  Method: Explanation  Response: Verbalizes Understanding  Comment: edu on role of PT, dc plan and safety    Mobility Training, taught by Jeanette Byrnes PT at 1/28/2025 11:58 AM.  Learner: Patient  Readiness: Acceptance  Method: Explanation  Response: Verbalizes Understanding  Comment: edu on role of PT, dc plan and safety    Education Comments  No comments found.

## 2025-01-28 NOTE — DISCHARGE INSTRUCTIONS
Dear Theron Moran,    You were hospitalized here at [facility] on 1/26/25 after presenting to the ED with 10/10 chest pain. With further investigation and lab work, this chest pain was caused by heart strain likely caused by hypertensive emergency from cocaine use. We continued to monitor your vitals, and we started a new medication called carvedilol to control your blood pressure. Finally, we got heart imaging completed that showed mild reduction in cardiac function. We are starting you on carvedilol and losartan to control your blood pressure, and we are recommending close follow up with cardiology.     Your medications changes and follow-up appointments are listed below in detail.  Referrals have been entered for your follow-up appointments.  You can schedule all follow-up appointments through the "[employer] wilfred, or you can call the  central scheduling line at [phone].  Please be sure to bring your discharge paperwork to all follow-up appointments.    If at any time you start developing chest pain, shortness of breath, or weakness, you should immediately return to the emergency room or call 911.    It was a pleasure taking care of you here at [facility].  We wish you a speedy recovery.    Sincerely,  Your  Medicine Team    Medication Changes:  - START TAKING   - carvedilol 6.25mg twice daily   - losartan 25mg every day   - aspirin 81mg every day   - atorvastatin 80mg every day   - colchicine 0.6mg daily until 2/28/2025  - Stop Taking   - lisinopril   - nifedipine

## 2025-01-28 NOTE — PROGRESS NOTES
Occupational Therapy    Evaluation    Patient Name: Theron Moran  MRN: [id number]  Department: [place] [address]  Room: [address] [place] [address]  Today's Date: 1/28/2025  Time Calculation  Start Time: 1000  Stop Time: 1017  Time Calculation (min): 17 min    Assessment  IP OT Assessment  OT Assessment: pt declining homecare, NN  Prognosis: Good  Barriers to Discharge Home: No anticipated barriers  Evaluation/Treatment Tolerance: Patient tolerated treatment well  Medical Staff Made Aware: Yes  End of Session Communication: Bedside nurse  End of Session Patient Position: Bed, 3 rail up, Alarm off, not on at start of session  Plan:  No Skilled OT: No acute OT goals identified  OT Frequency: OT eval only  Equipment Recommended upon Discharge: Wheeled walker  OT Recommended Transfer Status: Assist of 1  OT - OK to Discharge: Yes    Subjective   Current Problem:  1. Acute chest pain  Referral to Adult Sleep Medicine    Referral to Primary Care    Referral to Cardiology    CANCELED: Referral to Cardiology    CANCELED: Referral to Primary Care      2. Hypertensive emergency  Transthoracic Echo (TTE) Complete    Transthoracic Echo (TTE) Complete    aspirin 81 mg chewable tablet    atorvastatin (Lipitor) 80 mg tablet    colchicine 0.6 mg tablet    carvedilol (Coreg) 6.25 mg tablet    losartan (Cozaar) 25 mg tablet    nitroglycerin (Nitrostat) 0.4 mg SL tablet    CANCELED: Transthoracic Echo (TTE) Complete    CANCELED: Transthoracic Echo (TTE) Complete      3. Acute gout involving toe of left foot, unspecified cause        4. Essential (primary) hypertension  Transthoracic Echo (TTE) Complete    Referral to Adult Sleep Medicine    carvedilol (Coreg) 6.25 mg tablet    losartan (Cozaar) 25 mg tablet      5. Alcohol abuse  folic acid (Folvite) 1 mg tablet    multivitamin with minerals tablet    thiamine (Vitamin B-1) 100 mg tablet        General:  General  Reason for Referral: CP SOB  Past Medical History Relevant to Rehab: polysubstance use disorder,  subarachnoid hemorrhage, mood disorder, HTN, diabetes, gout, SI and HI, and hypertensive emergency with flash pulmonary edema  Family/Caregiver Present: No  Co-Treatment: PT  Co-Treatment Reason: maximize pt safety  Prior to Session Communication: Bedside nurse  Patient Position Received: Bed, 3 rail up, Alarm off, not on at start of session  Precautions:  Medical Precautions: Fall precautions  Precautions Comment: CIWA      Vital Signs Comment: standing /146 RN notified    Pain:  Pain Assessment  Pain Assessment: 0-10  0-10 (Numeric) Pain Score: 0 - No pain    Objective   Cognition:  Overall Cognitive Status: Within Functional Limits  Orientation Level: Oriented X4           Home Living:  Type of Home: House  Lives With: Spouse  Home Adaptive Equipment: Cane  Home Layout: One level  Home Access: Stairs to enter with rails, Level entry  Bathroom Shower/Tub: Walk-in shower  Bathroom Equipment:  (shower chair, grab bars shower)   Prior Function:  Level of [place]: Independent with ADLs and functional transfers, Independent with homemaking with ambulation  ADL Assistance: Independent  Homemaking Assistance: Independent  Ambulatory Assistance: Independent (cane occasionally)  Prior Function Comments: + drive, - work  IADL History:  IADL Comments: wife A IADLs as needed, - work, +drive, I IADLs  ADL:  Eating Assistance: Independent  Grooming Assistance:  (SBA anticipated standing)  Bathing Assistance:  (anticipated seated)  UE Dressing Assistance: Independent  LE Dressing Assistance:  (donned B socks EOB 2x att)  Toileting Assistance with Device:  (standing urination toilet S)  Activity Tolerance:  Endurance:  (fair)  Bed Mobility/Transfers: Bed Mobility  Bed Mobility:  (sup to sit SBA)    Transfers  Transfer:  (sit/stand SBA)      Functional Mobility:  Functional Mobility  Functional Mobility Performed:  (pt performed fxnl mob to/from bed/bathroom SBA)  Sitting Balance:  Dynamic Sitting Balance  Dynamic  Sitting-Level of Assistance: Close supervision  Standing Balance:  Dynamic Standing Balance  Dynamic Standing-Level of Assistance:  (SBA)    IADL's:   IADL Comments: wife A IADLs as needed, - work, +drive, I IADLs  Vision: Vision - Basic Assessment  Current Vision: No visual deficits  Sensation:  Light Touch: No apparent deficits  Strength:  Strength Comments: PRETTYE 4+/5       Coordination:  Movements are Fluid and Coordinated: Yes   Hand Function:  Hand Function  Gross Grasp: Functional  Extremities: RUE   RUE : Within Functional Limits and LUE   LUE: Within Functional Limits      Outcome Measures: [facility] Daily Activity  Putting on and taking off regular lower body clothing: None  Bathing (including washing, rinsing, drying): A little  Putting on and taking off regular upper body clothing: None  Toileting, which includes using toilet, bedpan or urinal: None  Taking care of personal grooming such as brushing teeth: None  Eating Meals: None  Daily Activity - Total Score: 23         and OT Adult Other Outcome Measures  4AT: -  Education Documentation  Body Mechanics, taught by Nena Glover OT at 1/28/2025 12:28 PM.  Learner: Patient  Readiness: Acceptance  Method: Explanation, Demonstration  Response: Verbalizes Understanding, Demonstrated Understanding  Comment: jeff golden ADLs    Precautions, taught by Nena Glover OT at 1/28/2025 12:28 PM.  Learner: Patient  Readiness: Acceptance  Method: Explanation, Demonstration  Response: Verbalizes Understanding, Demonstrated Understanding  Comment: jeff golden ADLs    ADL Training, taught by Nena Glover OT at 1/28/2025 12:28 PM.  Learner: Patient  Readiness: Acceptance  Method: Explanation, Demonstration  Response: Verbalizes Understanding, Demonstrated Understanding  Comment: jeff golden ADLs    Education Comments  No comments found.

## 2025-01-28 NOTE — SIGNIFICANT EVENT
Was called to the bedside by nursing who noted patient was anxious to leave. At that time his BP was 190s systolic and nursing gave prn hydralazine. Presented to the bedside where patient was insistent on leaving after finding out that a water pipe had broken at home and had flooded his basement. We discussed risks of leaving with high blood pressure including recurrence of chest pain, heart attack, flash pulmonary edema, stroke, and death. The patient verbalized understanding but still insisted on leaving despite the risks. Pharmacy was called to fill his antihypertensives and colchicine and these were brought to the patient. He was counseled on return precautions and left AMA this afternoon.    Hernan Morton MD  PGY-2, Internal Medicine

## 2025-01-28 NOTE — DISCHARGE SUMMARY
Discharge Diagnosis  Hypertensive Emergency   Type II Myocardial Infarction    Issues Requiring Follow-Up  Hypertension  Heart Failure  Substance Use  Gout    Discharge Meds     Medication List      START taking these medications     carvedilol 6.25 mg tablet; Commonly known as: Coreg; Take 1 tablet (6.25   mg) by mouth 2 times a day.   Certavite-Antioxidant  mg-mcg tablet; Generic drug: multivitamin   with minerals; Take 1 tablet by mouth once daily.; Start taking on:   January 29, 2025   folic acid 1 mg tablet; Commonly known as: Folvite; Take 1 tablet (1 mg)   by mouth once daily.; Start taking on: January 29, 2025   losartan 25 mg tablet; Commonly known as: Cozaar; Take 1 tablet (25 mg)   by mouth once daily.   nitroglycerin 0.4 mg SL tablet; Commonly known as: Nitrostat; Place 1   tablet (0.4 mg) under the tongue every 5 minutes if needed for chest pain.   thiamine 100 mg tablet; Commonly known as: Vitamin B-1; Take 1 tablet   (100 mg) by mouth once daily. Do not start before January 30, 2025.; Start   taking on: January 30, 2025     CHANGE how you take these medications     colchicine 0.6 mg tablet; Take 1 tablet (0.6 mg) by mouth once daily.;   Start taking on: January 29, 2025; What changed: when to take this     CONTINUE taking these medications     albuterol 90 mcg/actuation inhaler   aspirin 81 mg chewable tablet; Chew 1 tablet (81 mg) once daily.   atorvastatin 80 mg tablet; Commonly known as: Lipitor; Take 1 tablet (80   mg) by mouth once daily at bedtime.     STOP taking these medications     lisinopril 40 mg tablet   NIFEdipine ER 60 mg 24 hr tablet; Commonly known as: Adalat CC       Test Results Pending At Discharge  Pending Labs       Order Current Status    Cocaine, Urine, Confirmation In process    Opiate Confirmation, Urine In process    THC (Marijuana), Urine, Confirmation In process    Sterile Fluid Culture/Smear Preliminary result            Hospital Course  HPI:   43 y/o M with PMHx of  cocaine abuse, hypertensive emergency with flash pulmonary edema, HFrEF (EF 37% 1/2025), HTN, T2DM (A1c 6.9%), DLD, SI/HI, and tobacco abuse presenting with a chief complaint of chest pain and bilateral foot pain. On arrival, patient was very lethargic and fell asleep frequently during interview so he was an unreliable historian. He reported bilateral great toe pain L>R he had been dealing with for an unclear amount of time, but denies that he had been taking any medications for this. In additional he also reports he woke up with midsternal chest pain that was 10/10 this morning and resolved (but during interview he was inconsistent and reported 8/10 pain at times despite falling asleep and laying in bed comfortably). He states he has never experienced similar pain in the past. He denies, fever, cough, wheezing, SOB, abdominal pain, N/V/D/C.     On arrival BP was 196/143, max 224, otherwise tachy to 106, AF. Labwork notable for normal CMP, mild leukocytosis to 13.2 otherwise normal CBC, Trop 145 >156, Uric acid 6.3. EKG with ST and PACs, no obvious ischemic changes. He was given metoprolol 5mg IV and lisinopril 40mg x1,  as well as dilaudid afterwards pressures dropped to 120s. CXR negative, foot XR with osseous erosions and soft tissue swelling. Synovial fluid from toe sent off for culture.      Notably patient was recently admitted to the neuro-stroke service at [facility] 1/17-1/19/2025 for chest pain, shortness of breath, and dysarthria, with leg pain. He was found to be hypertensive to 200s, treated with Lasix 20 and Lisinopril. CTH with new right lacunar density, CTA with multivessel narrowing with no LVO, MRI Brain negative. He was restarted on home BP meds, had colchicine restarted, and sent home with plan for outpatient BP control.      After arrival to the floor, troponins were trended to peak at 156 -> 143-> 127, we continued colchicine for the gout flare, started CIWA, and held losartan with creatinine  showing SONIDO. We then completed a TTE, which showed no ischemic concerns. Results will be put below.     TTE 1/27/25  1. The left ventricular systolic function is mildly decreased, with a Brown's biplane calculated ejection fraction of 47%.   2. Spectral Doppler shows a Grade III (restrictive pattern) of left ventricular diastolic filling with an elevated left atrial pressure.   3. There is severely increased septal and severely increased posterior left ventricular wall thickness.   4. There is severely increased concentric left ventricular hypertrophy.   5. There is normal right ventricular global systolic function.   6. Mildly enlarged right ventricle.   7. The left atrium is severely dilated.   8. The right atrium is moderately dilated.   9. Mildly elevated right ventricular systolic pressure.  10. Left Ventricular Global Longitudinal Strain - 6.5 %.    Without concern for ischemic changes and CIWA scores 0 throughout admission, we were planning on discharging on 1/28/25 with follow up to cardiology, and starting coreg 6.25mg BID. His creatinine improved from 1.52 to 1.36 today prior to discharge. However, patient started to have increased hypertensive symptoms and HR, potentially concerning for withdrawal.     *In early afternoon on 1/28/25, patient was not amenable to staying even with increased blood pressures and heart rate. Discussion was had with patient to stay to prevent adverse effects, for blood pressure control, and potential withdrawal prevention. He insisted on leaving and left AMA on 1/28/25.     Pertinent Physical Exam At Time of Discharge  Physical Exam  Constitutional:       Appearance: Normal appearance.   Cardiovascular:      Rate and Rhythm: Regular rhythm. Tachycardia present.      Pulses: Normal pulses.      Heart sounds: Normal heart sounds.   Pulmonary:      Effort: Pulmonary effort is normal.      Breath sounds: Normal breath sounds.   Abdominal:      General: Abdomen is flat.       Palpations: Abdomen is soft.   Musculoskeletal:         General: Tenderness present. No swelling.      Comments: Left Hallux Gout Pain   Skin:     General: Skin is warm and dry.   Neurological:      General: No focal deficit present.      Mental Status: He is alert and oriented to person, place, and time.       Outpatient Follow-Up  Future Appointments   Date Time Provider Department Center   2/11/2025 11:20 AM Yvette Kelsey MD [id number]         Curtis Lawrence DO

## 2025-01-29 LAB
BACTERIA FLD CULT: NORMAL
GRAM STN SPEC: NORMAL
GRAM STN SPEC: NORMAL

## 2025-01-30 LAB
BZE UR-MCNC: >2000 NG/ML
CARBOXYTHC UR-MCNC: 36 NG/ML

## 2025-02-02 ENCOUNTER — CLINICAL SUPPORT (OUTPATIENT)
Dept: EMERGENCY MEDICINE | Facility: HOSPITAL | Age: 45
End: 2025-02-02
Payer: COMMERCIAL

## 2025-02-02 ENCOUNTER — HOSPITAL ENCOUNTER (INPATIENT)
Facility: HOSPITAL | Age: 45
LOS: 5 days | Discharge: LEFT BEFORE DISCHARGE | End: 2025-02-07
Attending: EMERGENCY MEDICINE | Admitting: HOSPITALIST
Payer: COMMERCIAL

## 2025-02-02 ENCOUNTER — APPOINTMENT (OUTPATIENT)
Dept: RADIOLOGY | Facility: HOSPITAL | Age: 45
End: 2025-02-02
Payer: COMMERCIAL

## 2025-02-02 DIAGNOSIS — M10.079 ACUTE IDIOPATHIC GOUT INVOLVING TOE, UNSPECIFIED LATERALITY: ICD-10-CM

## 2025-02-02 DIAGNOSIS — G45.9 TIA (TRANSIENT ISCHEMIC ATTACK): ICD-10-CM

## 2025-02-02 DIAGNOSIS — I50.20 HFREF (HEART FAILURE WITH REDUCED EJECTION FRACTION): ICD-10-CM

## 2025-02-02 DIAGNOSIS — I95.9 HYPOTENSION: Primary | ICD-10-CM

## 2025-02-02 DIAGNOSIS — F17.200 TOBACCO DEPENDENCE: ICD-10-CM

## 2025-02-02 DIAGNOSIS — I21.4 NSTEMI (NON-ST ELEVATED MYOCARDIAL INFARCTION) (MULTI): ICD-10-CM

## 2025-02-02 DIAGNOSIS — M10.9 GOUT, UNSPECIFIED CAUSE, UNSPECIFIED CHRONICITY, UNSPECIFIED SITE: ICD-10-CM

## 2025-02-02 DIAGNOSIS — I1A.0 RESISTANT HYPERTENSION: ICD-10-CM

## 2025-02-02 DIAGNOSIS — M1A.1790: ICD-10-CM

## 2025-02-02 DIAGNOSIS — R07.9 CHEST PAIN, UNSPECIFIED TYPE: ICD-10-CM

## 2025-02-02 DIAGNOSIS — T56.0X1A: ICD-10-CM

## 2025-02-02 LAB
AMPHETAMINES UR QL SCN: ABNORMAL
ANION GAP SERPL CALC-SCNC: 15 MMOL/L (ref 10–20)
APPEARANCE UR: CLEAR
APTT PPP: 33 SECONDS (ref 27–38)
BARBITURATES UR QL SCN: ABNORMAL
BASOPHILS # BLD AUTO: 0.08 X10*3/UL (ref 0–0.1)
BASOPHILS # BLD AUTO: 0.08 X10*3/UL (ref 0–0.1)
BASOPHILS NFR BLD AUTO: 0.5 %
BASOPHILS NFR BLD AUTO: 0.7 %
BENZODIAZ UR QL SCN: ABNORMAL
BILIRUB UR STRIP.AUTO-MCNC: NEGATIVE MG/DL
BUN SERPL-MCNC: 24 MG/DL (ref 6–23)
BZE UR QL SCN: ABNORMAL
CALCIUM SERPL-MCNC: 8.9 MG/DL (ref 8.6–10.6)
CANNABINOIDS UR QL SCN: ABNORMAL
CARDIAC TROPONIN I PNL SERPL HS: 207 NG/L (ref 0–53)
CARDIAC TROPONIN I PNL SERPL HS: 234 NG/L (ref 0–53)
CARDIAC TROPONIN I PNL SERPL HS: 248 NG/L (ref 0–53)
CHLORIDE SERPL-SCNC: 107 MMOL/L (ref 98–107)
CO2 SERPL-SCNC: 23 MMOL/L (ref 21–32)
COLOR UR: ABNORMAL
CREAT SERPL-MCNC: 1.27 MG/DL (ref 0.5–1.3)
CRP SERPL-MCNC: 0.76 MG/DL
EGFRCR SERPLBLD CKD-EPI 2021: 71 ML/MIN/1.73M*2
EOSINOPHIL # BLD AUTO: 0.24 X10*3/UL (ref 0–0.7)
EOSINOPHIL # BLD AUTO: 0.25 X10*3/UL (ref 0–0.7)
EOSINOPHIL NFR BLD AUTO: 1.5 %
EOSINOPHIL NFR BLD AUTO: 2.2 %
ERYTHROCYTE [DISTWIDTH] IN BLOOD BY AUTOMATED COUNT: 13.2 % (ref 11.5–14.5)
ERYTHROCYTE [DISTWIDTH] IN BLOOD BY AUTOMATED COUNT: 13.7 % (ref 11.5–14.5)
ERYTHROCYTE [SEDIMENTATION RATE] IN BLOOD BY WESTERGREN METHOD: 15 MM/H (ref 0–15)
ETHANOL SERPL-MCNC: <10 MG/DL
FENTANYL+NORFENTANYL UR QL SCN: ABNORMAL
GLUCOSE SERPL-MCNC: 105 MG/DL (ref 74–99)
GLUCOSE UR STRIP.AUTO-MCNC: NORMAL MG/DL
HCT VFR BLD AUTO: 43.2 % (ref 41–52)
HCT VFR BLD AUTO: 47.2 % (ref 41–52)
HGB BLD-MCNC: 14.8 G/DL (ref 13.5–17.5)
HGB BLD-MCNC: 15.7 G/DL (ref 13.5–17.5)
IMM GRANULOCYTES # BLD AUTO: 0.07 X10*3/UL (ref 0–0.7)
IMM GRANULOCYTES # BLD AUTO: 0.16 X10*3/UL (ref 0–0.7)
IMM GRANULOCYTES NFR BLD AUTO: 0.4 % (ref 0–0.9)
IMM GRANULOCYTES NFR BLD AUTO: 1.4 % (ref 0–0.9)
KETONES UR STRIP.AUTO-MCNC: NEGATIVE MG/DL
LEUKOCYTE ESTERASE UR QL STRIP.AUTO: NEGATIVE
LYMPHOCYTES # BLD AUTO: 1.47 X10*3/UL (ref 1.2–4.8)
LYMPHOCYTES # BLD AUTO: 2.23 X10*3/UL (ref 1.2–4.8)
LYMPHOCYTES NFR BLD AUTO: 19.2 %
LYMPHOCYTES NFR BLD AUTO: 9 %
MCH RBC QN AUTO: 25.5 PG (ref 26–34)
MCH RBC QN AUTO: 26.1 PG (ref 26–34)
MCHC RBC AUTO-ENTMCNC: 33.3 G/DL (ref 32–36)
MCHC RBC AUTO-ENTMCNC: 34.3 G/DL (ref 32–36)
MCV RBC AUTO: 76 FL (ref 80–100)
MCV RBC AUTO: 77 FL (ref 80–100)
METHADONE UR QL SCN: ABNORMAL
MONOCYTES # BLD AUTO: 1.2 X10*3/UL (ref 0.1–1)
MONOCYTES # BLD AUTO: 1.3 X10*3/UL (ref 0.1–1)
MONOCYTES NFR BLD AUTO: 10.3 %
MONOCYTES NFR BLD AUTO: 7.9 %
MUCOUS THREADS #/AREA URNS AUTO: NORMAL /LPF
NEUTROPHILS # BLD AUTO: 13.26 X10*3/UL (ref 1.2–7.7)
NEUTROPHILS # BLD AUTO: 7.69 X10*3/UL (ref 1.2–7.7)
NEUTROPHILS NFR BLD AUTO: 66.2 %
NEUTROPHILS NFR BLD AUTO: 80.7 %
NITRITE UR QL STRIP.AUTO: NEGATIVE
NRBC BLD-RTO: 0 /100 WBCS (ref 0–0)
NRBC BLD-RTO: 0 /100 WBCS (ref 0–0)
OPIATES UR QL SCN: ABNORMAL
OXYCODONE+OXYMORPHONE UR QL SCN: ABNORMAL
PCP UR QL SCN: ABNORMAL
PH UR STRIP.AUTO: 5.5 [PH]
PLATELET # BLD AUTO: 201 X10*3/UL (ref 150–450)
PLATELET # BLD AUTO: 223 X10*3/UL (ref 150–450)
POTASSIUM SERPL-SCNC: 3.6 MMOL/L (ref 3.5–5.3)
PROT UR STRIP.AUTO-MCNC: ABNORMAL MG/DL
RBC # BLD AUTO: 5.67 X10*6/UL (ref 4.5–5.9)
RBC # BLD AUTO: 6.15 X10*6/UL (ref 4.5–5.9)
RBC # UR STRIP.AUTO: ABNORMAL /UL
RBC #/AREA URNS AUTO: NORMAL /HPF
SODIUM SERPL-SCNC: 141 MMOL/L (ref 136–145)
SP GR UR STRIP.AUTO: 1.05
SQUAMOUS #/AREA URNS AUTO: NORMAL /HPF
UFH PPP CHRO-ACNC: <0.1 IU/ML
URATE SERPL-MCNC: 8 MG/DL (ref 4–7.5)
UROBILINOGEN UR STRIP.AUTO-MCNC: NORMAL MG/DL
WBC # BLD AUTO: 11.6 X10*3/UL (ref 4.4–11.3)
WBC # BLD AUTO: 16.4 X10*3/UL (ref 4.4–11.3)
WBC #/AREA URNS AUTO: NORMAL /HPF

## 2025-02-02 PROCEDURE — 93010 ELECTROCARDIOGRAM REPORT: CPT | Performed by: INTERNAL MEDICINE

## 2025-02-02 PROCEDURE — 99285 EMERGENCY DEPT VISIT HI MDM: CPT | Mod: 25 | Performed by: EMERGENCY MEDICINE

## 2025-02-02 PROCEDURE — 2500000001 HC RX 250 WO HCPCS SELF ADMINISTERED DRUGS (ALT 637 FOR MEDICARE OP)

## 2025-02-02 PROCEDURE — 84550 ASSAY OF BLOOD/URIC ACID: CPT | Performed by: EMERGENCY MEDICINE

## 2025-02-02 PROCEDURE — 74174 CTA ABD&PLVS W/CONTRAST: CPT | Performed by: RADIOLOGY

## 2025-02-02 PROCEDURE — 85652 RBC SED RATE AUTOMATED: CPT

## 2025-02-02 PROCEDURE — 80048 BASIC METABOLIC PNL TOTAL CA: CPT | Performed by: EMERGENCY MEDICINE

## 2025-02-02 PROCEDURE — 84484 ASSAY OF TROPONIN QUANT: CPT | Performed by: EMERGENCY MEDICINE

## 2025-02-02 PROCEDURE — 36415 COLL VENOUS BLD VENIPUNCTURE: CPT | Performed by: EMERGENCY MEDICINE

## 2025-02-02 PROCEDURE — 71275 CT ANGIOGRAPHY CHEST: CPT | Performed by: RADIOLOGY

## 2025-02-02 PROCEDURE — 93005 ELECTROCARDIOGRAM TRACING: CPT

## 2025-02-02 PROCEDURE — 80307 DRUG TEST PRSMV CHEM ANLYZR: CPT

## 2025-02-02 PROCEDURE — 2500000004 HC RX 250 GENERAL PHARMACY W/ HCPCS (ALT 636 FOR OP/ED)

## 2025-02-02 PROCEDURE — 86140 C-REACTIVE PROTEIN: CPT

## 2025-02-02 PROCEDURE — 84484 ASSAY OF TROPONIN QUANT: CPT

## 2025-02-02 PROCEDURE — 1100000001 HC PRIVATE ROOM DAILY

## 2025-02-02 PROCEDURE — 85025 COMPLETE CBC W/AUTO DIFF WBC: CPT | Performed by: EMERGENCY MEDICINE

## 2025-02-02 PROCEDURE — 85025 COMPLETE CBC W/AUTO DIFF WBC: CPT

## 2025-02-02 PROCEDURE — 96375 TX/PRO/DX INJ NEW DRUG ADDON: CPT

## 2025-02-02 PROCEDURE — 2500000001 HC RX 250 WO HCPCS SELF ADMINISTERED DRUGS (ALT 637 FOR MEDICARE OP): Performed by: EMERGENCY MEDICINE

## 2025-02-02 PROCEDURE — 2500000002 HC RX 250 W HCPCS SELF ADMINISTERED DRUGS (ALT 637 FOR MEDICARE OP, ALT 636 FOR OP/ED)

## 2025-02-02 PROCEDURE — 82374 ASSAY BLOOD CARBON DIOXIDE: CPT

## 2025-02-02 PROCEDURE — 85730 THROMBOPLASTIN TIME PARTIAL: CPT

## 2025-02-02 PROCEDURE — 99285 EMERGENCY DEPT VISIT HI MDM: CPT | Performed by: EMERGENCY MEDICINE

## 2025-02-02 PROCEDURE — 96366 THER/PROPH/DIAG IV INF ADDON: CPT

## 2025-02-02 PROCEDURE — 85520 HEPARIN ASSAY: CPT

## 2025-02-02 PROCEDURE — 81001 URINALYSIS AUTO W/SCOPE: CPT

## 2025-02-02 PROCEDURE — 71275 CT ANGIOGRAPHY CHEST: CPT

## 2025-02-02 PROCEDURE — 71046 X-RAY EXAM CHEST 2 VIEWS: CPT

## 2025-02-02 PROCEDURE — 80069 RENAL FUNCTION PANEL: CPT | Performed by: EMERGENCY MEDICINE

## 2025-02-02 PROCEDURE — 96374 THER/PROPH/DIAG INJ IV PUSH: CPT

## 2025-02-02 PROCEDURE — 84100 ASSAY OF PHOSPHORUS: CPT

## 2025-02-02 PROCEDURE — 93010 ELECTROCARDIOGRAM REPORT: CPT | Performed by: EMERGENCY MEDICINE

## 2025-02-02 PROCEDURE — 82077 ASSAY SPEC XCP UR&BREATH IA: CPT

## 2025-02-02 PROCEDURE — 96365 THER/PROPH/DIAG IV INF INIT: CPT | Mod: 59

## 2025-02-02 PROCEDURE — 2550000001 HC RX 255 CONTRASTS: Performed by: EMERGENCY MEDICINE

## 2025-02-02 PROCEDURE — 71046 X-RAY EXAM CHEST 2 VIEWS: CPT | Performed by: RADIOLOGY

## 2025-02-02 RX ORDER — ENOXAPARIN SODIUM 100 MG/ML
40 INJECTION SUBCUTANEOUS EVERY 24 HOURS
Status: DISCONTINUED | OUTPATIENT
Start: 2025-02-02 | End: 2025-02-07 | Stop reason: HOSPADM

## 2025-02-02 RX ORDER — ATORVASTATIN CALCIUM 80 MG/1
80 TABLET, FILM COATED ORAL NIGHTLY
Status: DISCONTINUED | OUTPATIENT
Start: 2025-02-02 | End: 2025-02-07 | Stop reason: HOSPADM

## 2025-02-02 RX ORDER — CARVEDILOL 6.25 MG/1
6.25 TABLET ORAL 2 TIMES DAILY
Status: DISCONTINUED | OUTPATIENT
Start: 2025-02-02 | End: 2025-02-03

## 2025-02-02 RX ORDER — ACETAMINOPHEN 325 MG/1
975 TABLET ORAL EVERY 8 HOURS PRN
Status: DISCONTINUED | OUTPATIENT
Start: 2025-02-02 | End: 2025-02-07 | Stop reason: HOSPADM

## 2025-02-02 RX ORDER — MORPHINE SULFATE 4 MG/ML
4 INJECTION INTRAVENOUS ONCE
Status: COMPLETED | OUTPATIENT
Start: 2025-02-02 | End: 2025-02-02

## 2025-02-02 RX ORDER — NITROGLYCERIN 0.4 MG/1
0.4 TABLET SUBLINGUAL EVERY 5 MIN PRN
Status: DISCONTINUED | OUTPATIENT
Start: 2025-02-02 | End: 2025-02-04

## 2025-02-02 RX ORDER — HYDRALAZINE HYDROCHLORIDE 25 MG/1
25 TABLET, FILM COATED ORAL EVERY 6 HOURS PRN
Status: DISCONTINUED | OUTPATIENT
Start: 2025-02-02 | End: 2025-02-04

## 2025-02-02 RX ORDER — OXYCODONE HYDROCHLORIDE 5 MG/1
5 TABLET ORAL EVERY 8 HOURS PRN
Status: DISCONTINUED | OUTPATIENT
Start: 2025-02-02 | End: 2025-02-04

## 2025-02-02 RX ORDER — PANTOPRAZOLE SODIUM 40 MG/1
40 TABLET, DELAYED RELEASE ORAL
Status: DISCONTINUED | OUTPATIENT
Start: 2025-02-02 | End: 2025-02-07 | Stop reason: HOSPADM

## 2025-02-02 RX ORDER — NAPROXEN SODIUM 220 MG/1
324 TABLET, FILM COATED ORAL ONCE
Status: COMPLETED | OUTPATIENT
Start: 2025-02-02 | End: 2025-02-02

## 2025-02-02 RX ORDER — FOLIC ACID 1 MG/1
1 TABLET ORAL DAILY
Status: DISCONTINUED | OUTPATIENT
Start: 2025-02-03 | End: 2025-02-07 | Stop reason: HOSPADM

## 2025-02-02 RX ORDER — HEPARIN SODIUM 10000 [USP'U]/100ML
0-4000 INJECTION, SOLUTION INTRAVENOUS CONTINUOUS
Status: DISCONTINUED | OUTPATIENT
Start: 2025-02-02 | End: 2025-02-02

## 2025-02-02 RX ORDER — COLCHICINE 0.6 MG/1
0.6 TABLET ORAL 2 TIMES DAILY
Status: DISCONTINUED | OUTPATIENT
Start: 2025-02-03 | End: 2025-02-06

## 2025-02-02 RX ORDER — LOSARTAN POTASSIUM 25 MG/1
25 TABLET ORAL ONCE
Status: COMPLETED | OUTPATIENT
Start: 2025-02-02 | End: 2025-02-02

## 2025-02-02 RX ORDER — NAPROXEN SODIUM 220 MG/1
81 TABLET, FILM COATED ORAL DAILY
Status: DISCONTINUED | OUTPATIENT
Start: 2025-02-03 | End: 2025-02-07 | Stop reason: HOSPADM

## 2025-02-02 RX ORDER — CARVEDILOL 12.5 MG/1
6.25 TABLET ORAL ONCE
Status: COMPLETED | OUTPATIENT
Start: 2025-02-02 | End: 2025-02-02

## 2025-02-02 RX ORDER — LABETALOL HYDROCHLORIDE 5 MG/ML
10 INJECTION, SOLUTION INTRAVENOUS EVERY 4 HOURS PRN
Status: DISCONTINUED | OUTPATIENT
Start: 2025-02-02 | End: 2025-02-02

## 2025-02-02 RX ORDER — MELOXICAM 15 MG/1
15 TABLET ORAL ONCE
Status: COMPLETED | OUTPATIENT
Start: 2025-02-02 | End: 2025-02-02

## 2025-02-02 RX ORDER — LANOLIN ALCOHOL/MO/W.PET/CERES
100 CREAM (GRAM) TOPICAL DAILY
Status: DISCONTINUED | OUTPATIENT
Start: 2025-02-03 | End: 2025-02-07 | Stop reason: HOSPADM

## 2025-02-02 RX ORDER — COLCHICINE 0.6 MG/1
1.2 TABLET ORAL ONCE
Status: COMPLETED | OUTPATIENT
Start: 2025-02-02 | End: 2025-02-02

## 2025-02-02 RX ORDER — COLCHICINE 0.6 MG/1
0.6 TABLET ORAL DAILY
Status: DISCONTINUED | OUTPATIENT
Start: 2025-02-02 | End: 2025-02-02

## 2025-02-02 RX ORDER — LOSARTAN POTASSIUM 25 MG/1
25 TABLET ORAL DAILY
Status: DISCONTINUED | OUTPATIENT
Start: 2025-02-03 | End: 2025-02-03

## 2025-02-02 RX ADMIN — IOHEXOL 75 ML: 350 INJECTION, SOLUTION INTRAVENOUS at 12:37

## 2025-02-02 RX ADMIN — MORPHINE SULFATE 4 MG: 4 INJECTION INTRAVENOUS at 10:37

## 2025-02-02 RX ADMIN — LABETALOL HYDROCHLORIDE 10 MG: 5 INJECTION, SOLUTION INTRAVENOUS at 10:57

## 2025-02-02 RX ADMIN — MELOXICAM 15 MG: 15 TABLET ORAL at 09:30

## 2025-02-02 RX ADMIN — COLCHICINE 0.6 MG: 0.6 TABLET, FILM COATED ORAL at 08:25

## 2025-02-02 RX ADMIN — ATORVASTATIN CALCIUM 80 MG: 80 TABLET, FILM COATED ORAL at 20:24

## 2025-02-02 RX ADMIN — ENOXAPARIN SODIUM 40 MG: 100 INJECTION SUBCUTANEOUS at 20:25

## 2025-02-02 RX ADMIN — CARVEDILOL 6.25 MG: 12.5 TABLET, FILM COATED ORAL at 08:25

## 2025-02-02 RX ADMIN — ASPIRIN 324 MG: 81 TABLET, CHEWABLE ORAL at 08:25

## 2025-02-02 RX ADMIN — CARVEDILOL 6.25 MG: 6.25 TABLET, FILM COATED ORAL at 20:25

## 2025-02-02 RX ADMIN — HEPARIN SODIUM 1000 UNITS/HR: 10000 INJECTION, SOLUTION INTRAVENOUS at 14:32

## 2025-02-02 RX ADMIN — PANTOPRAZOLE SODIUM 40 MG: 40 TABLET, DELAYED RELEASE ORAL at 08:25

## 2025-02-02 RX ADMIN — LOSARTAN POTASSIUM 25 MG: 25 TABLET, FILM COATED ORAL at 08:25

## 2025-02-02 RX ADMIN — COLCHICINE 1.2 MG: 0.6 TABLET ORAL at 16:53

## 2025-02-02 RX ADMIN — OXYCODONE 5 MG: 5 TABLET ORAL at 19:15

## 2025-02-02 SDOH — SOCIAL STABILITY: SOCIAL INSECURITY: WITHIN THE LAST YEAR, HAVE YOU BEEN HUMILIATED OR EMOTIONALLY ABUSED IN OTHER WAYS BY YOUR PARTNER OR EX-PARTNER?: NO

## 2025-02-02 SDOH — SOCIAL STABILITY: SOCIAL INSECURITY: HAS ANYONE EVER THREATENED TO HURT YOUR FAMILY OR YOUR PETS?: NO

## 2025-02-02 SDOH — ECONOMIC STABILITY: HOUSING INSECURITY: IN THE LAST 12 MONTHS, WAS THERE A TIME WHEN YOU WERE NOT ABLE TO PAY THE MORTGAGE OR RENT ON TIME?: NO

## 2025-02-02 SDOH — ECONOMIC STABILITY: TRANSPORTATION INSECURITY: IN THE PAST 12 MONTHS, HAS LACK OF TRANSPORTATION KEPT YOU FROM MEDICAL APPOINTMENTS OR FROM GETTING MEDICATIONS?: NO

## 2025-02-02 SDOH — SOCIAL STABILITY: SOCIAL INSECURITY: ARE YOU OR HAVE YOU BEEN THREATENED OR ABUSED PHYSICALLY, EMOTIONALLY, OR SEXUALLY BY ANYONE?: NO

## 2025-02-02 SDOH — ECONOMIC STABILITY: HOUSING INSECURITY: AT ANY TIME IN THE PAST 12 MONTHS, WERE YOU HOMELESS OR LIVING IN A SHELTER (INCLUDING NOW)?: NO

## 2025-02-02 SDOH — ECONOMIC STABILITY: INCOME INSECURITY: IN THE PAST 12 MONTHS HAS THE ELECTRIC, GAS, OIL, OR WATER COMPANY THREATENED TO SHUT OFF SERVICES IN YOUR HOME?: NO

## 2025-02-02 SDOH — ECONOMIC STABILITY: FOOD INSECURITY: WITHIN THE PAST 12 MONTHS, YOU WORRIED THAT YOUR FOOD WOULD RUN OUT BEFORE YOU GOT THE MONEY TO BUY MORE.: NEVER TRUE

## 2025-02-02 SDOH — ECONOMIC STABILITY: FOOD INSECURITY: WITHIN THE PAST 12 MONTHS, THE FOOD YOU BOUGHT JUST DIDN'T LAST AND YOU DIDN'T HAVE MONEY TO GET MORE.: NEVER TRUE

## 2025-02-02 SDOH — SOCIAL STABILITY: SOCIAL INSECURITY: WITHIN THE LAST YEAR, HAVE YOU BEEN AFRAID OF YOUR PARTNER OR EX-PARTNER?: NO

## 2025-02-02 SDOH — ECONOMIC STABILITY: HOUSING INSECURITY: IN THE PAST 12 MONTHS, HOW MANY TIMES HAVE YOU MOVED WHERE YOU WERE LIVING?: 1

## 2025-02-02 SDOH — SOCIAL STABILITY: SOCIAL INSECURITY: HAVE YOU HAD THOUGHTS OF HARMING ANYONE ELSE?: NO

## 2025-02-02 SDOH — SOCIAL STABILITY: SOCIAL INSECURITY: HAVE YOU HAD ANY THOUGHTS OF HARMING ANYONE ELSE?: NO

## 2025-02-02 SDOH — ECONOMIC STABILITY: FOOD INSECURITY: HOW HARD IS IT FOR YOU TO PAY FOR THE VERY BASICS LIKE FOOD, HOUSING, MEDICAL CARE, AND HEATING?: NOT HARD AT ALL

## 2025-02-02 SDOH — SOCIAL STABILITY: SOCIAL INSECURITY: DO YOU FEEL ANYONE HAS EXPLOITED OR TAKEN ADVANTAGE OF YOU FINANCIALLY OR OF YOUR PERSONAL PROPERTY?: NO

## 2025-02-02 SDOH — SOCIAL STABILITY: SOCIAL INSECURITY: ARE THERE ANY APPARENT SIGNS OF INJURIES/BEHAVIORS THAT COULD BE RELATED TO ABUSE/NEGLECT?: NO

## 2025-02-02 SDOH — HEALTH STABILITY: MENTAL HEALTH: EXPERIENCED ANY OF THE FOLLOWING LIFE EVENTS: OTHER (COMMENT)

## 2025-02-02 SDOH — SOCIAL STABILITY: SOCIAL INSECURITY: DO YOU FEEL UNSAFE GOING BACK TO THE PLACE WHERE YOU ARE LIVING?: NO

## 2025-02-02 SDOH — SOCIAL STABILITY: SOCIAL INSECURITY: ABUSE: ADULT

## 2025-02-02 SDOH — SOCIAL STABILITY: SOCIAL INSECURITY: WERE YOU ABLE TO COMPLETE ALL THE BEHAVIORAL HEALTH SCREENINGS?: YES

## 2025-02-02 SDOH — SOCIAL STABILITY: SOCIAL INSECURITY: DOES ANYONE TRY TO KEEP YOU FROM HAVING/CONTACTING OTHER FRIENDS OR DOING THINGS OUTSIDE YOUR HOME?: NO

## 2025-02-02 ASSESSMENT — PAIN DESCRIPTION - LOCATION
LOCATION: FOOT
LOCATION: FOOT

## 2025-02-02 ASSESSMENT — ACTIVITIES OF DAILY LIVING (ADL)
WALKS IN HOME: INDEPENDENT
HEARING - RIGHT EAR: FUNCTIONAL
GROOMING: INDEPENDENT
FEEDING YOURSELF: INDEPENDENT
PATIENT'S MEMORY ADEQUATE TO SAFELY COMPLETE DAILY ACTIVITIES?: YES
LACK_OF_TRANSPORTATION: NO
BATHING: INDEPENDENT
TOILETING: INDEPENDENT
JUDGMENT_ADEQUATE_SAFELY_COMPLETE_DAILY_ACTIVITIES: YES
HEARING - LEFT EAR: FUNCTIONAL
ADEQUATE_TO_COMPLETE_ADL: YES
DRESSING YOURSELF: INDEPENDENT

## 2025-02-02 ASSESSMENT — COGNITIVE AND FUNCTIONAL STATUS - GENERAL
CLIMB 3 TO 5 STEPS WITH RAILING: A LITTLE
MOBILITY SCORE: 22
WALKING IN HOSPITAL ROOM: A LITTLE
PATIENT BASELINE BEDBOUND: NO
DAILY ACTIVITIY SCORE: 24

## 2025-02-02 ASSESSMENT — LIFESTYLE VARIABLES
AUDIT-C TOTAL SCORE: 5
SUBSTANCE_ABUSE_PAST_12_MONTHS: YES
AUDIT TOTAL SCORE: 9
EVER FELT BAD OR GUILTY ABOUT YOUR DRINKING: NO
HOW OFTEN DURING THE LAST YEAR HAVE YOU FOUND THAT YOU WERE NOT ABLE TO STOP DRINKING ONCE YOU HAD STARTED: DAILY OR ALMOST DAILY
HOW OFTEN DO YOU HAVE 6 OR MORE DRINKS ON ONE OCCASION: LESS THAN MONTHLY
HAVE PEOPLE ANNOYED YOU BY CRITICIZING YOUR DRINKING: NO
TOTAL SCORE: 0
HOW OFTEN DO YOU HAVE A DRINK CONTAINING ALCOHOL: 4 OR MORE TIMES A WEEK
HOW OFTEN DURING THE LAST YEAR HAVE YOU FAILED TO DO WHAT WAS NORMALLY EXPECTED FROM YOU BECAUSE OF DRINKING: NEVER
AUDIT-C TOTAL SCORE: 5
EVER HAD A DRINK FIRST THING IN THE MORNING TO STEADY YOUR NERVES TO GET RID OF A HANGOVER: NO
HAVE YOU OR SOMEONE ELSE BEEN INJURED AS A RESULT OF YOUR DRINKING: NO
HOW OFTEN DURING THE LAST YEAR HAVE YOU NEEDED AN ALCOHOLIC DRINK FIRST THING IN THE MORNING TO GET YOURSELF GOING AFTER A NIGHT OF HEAVY DRINKING: NEVER
HAS A RELATIVE, FRIEND, DOCTOR, OR ANOTHER HEALTH PROFESSIONAL EXPRESSED CONCERN ABOUT YOUR DRINKING OR SUGGESTED YOU CUT DOWN: NO
PRESCIPTION_ABUSE_PAST_12_MONTHS: NO
HAVE YOU EVER FELT YOU SHOULD CUT DOWN ON YOUR DRINKING: NO
SKIP TO QUESTIONS 9-10: 0
HOW OFTEN DURING THE LAST YEAR HAVE YOU BEEN UNABLE TO REMEMBER WHAT HAPPENED THE NIGHT BEFORE BECAUSE YOU HAD BEEN DRINKING: NEVER
HOW OFTEN DURING THE LAST YEAR HAVE YOU HAD A FEELING OF GUILT OR REMORSE AFTER DRINKING: NEVER
HOW MANY STANDARD DRINKS CONTAINING ALCOHOL DO YOU HAVE ON A TYPICAL DAY: 1 OR 2
AUDIT TOTAL SCORE: 4

## 2025-02-02 ASSESSMENT — PATIENT HEALTH QUESTIONNAIRE - PHQ9
2. FEELING DOWN, DEPRESSED OR HOPELESS: NOT AT ALL
SUM OF ALL RESPONSES TO PHQ9 QUESTIONS 1 & 2: 0
1. LITTLE INTEREST OR PLEASURE IN DOING THINGS: NOT AT ALL

## 2025-02-02 ASSESSMENT — PAIN SCALES - GENERAL
PAINLEVEL_OUTOF10: 8
PAINLEVEL_OUTOF10: 10 - WORST POSSIBLE PAIN
PAINLEVEL_OUTOF10: 4
PAINLEVEL_OUTOF10: 10 - WORST POSSIBLE PAIN

## 2025-02-02 ASSESSMENT — HEART SCORE
AGE: <45
HEART SCORE: 4
RISK FACTORS: >2 RISK FACTORS OR HX OF ATHEROSCLEROTIC DISEASE
HISTORY: MODERATELY SUSPICIOUS
TROPONIN: 1-3 TIMES NORMAL LIMIT
ECG: NORMAL

## 2025-02-02 ASSESSMENT — PAIN DESCRIPTION - DESCRIPTORS
DESCRIPTORS: ACHING
DESCRIPTORS: ACHING

## 2025-02-02 ASSESSMENT — PAIN - FUNCTIONAL ASSESSMENT: PAIN_FUNCTIONAL_ASSESSMENT: 0-10

## 2025-02-02 ASSESSMENT — COLUMBIA-SUICIDE SEVERITY RATING SCALE - C-SSRS
6. HAVE YOU EVER DONE ANYTHING, STARTED TO DO ANYTHING, OR PREPARED TO DO ANYTHING TO END YOUR LIFE?: NO
6. HAVE YOU EVER DONE ANYTHING, STARTED TO DO ANYTHING, OR PREPARED TO DO ANYTHING TO END YOUR LIFE?: NO
1. IN THE PAST MONTH, HAVE YOU WISHED YOU WERE DEAD OR WISHED YOU COULD GO TO SLEEP AND NOT WAKE UP?: NO
2. HAVE YOU ACTUALLY HAD ANY THOUGHTS OF KILLING YOURSELF?: NO
2. HAVE YOU ACTUALLY HAD ANY THOUGHTS OF KILLING YOURSELF?: NO
1. IN THE PAST MONTH, HAVE YOU WISHED YOU WERE DEAD OR WISHED YOU COULD GO TO SLEEP AND NOT WAKE UP?: NO

## 2025-02-02 ASSESSMENT — PAIN DESCRIPTION - FREQUENCY: FREQUENCY: CONSTANT/CONTINUOUS

## 2025-02-02 ASSESSMENT — PAIN DESCRIPTION - ORIENTATION
ORIENTATION: RIGHT;LEFT
ORIENTATION: RIGHT;LEFT

## 2025-02-02 ASSESSMENT — PAIN DESCRIPTION - PAIN TYPE: TYPE: ACUTE PAIN

## 2025-02-02 NOTE — PROGRESS NOTES
Pharmacy Medication History Review    Theron Moran is a 44 y.o. male admitted for Resistant hypertension. Pharmacy reviewed the patient's [id number] medications and allergies for accuracy.    Medications ADDED:  N/A  Medications CHANGED:  N/A  Medications REMOVED:   N/A     The list below reflects the updated PTA list.   Prior to Admission Medications   Prescriptions Last Dose Informant   albuterol 90 mcg/actuation inhaler Past Week Self   Sig: Inhale 2 puffs every 4 hours if needed.   aspirin 81 mg chewable tablet 2/1/2025 Bedtime Self   Sig: Chew 1 tablet (81 mg) once daily.   atorvastatin (Lipitor) 80 mg tablet 2/1/2025 Bedtime Self   Sig: Take 1 tablet (80 mg) by mouth once daily at bedtime.   carvedilol (Coreg) 6.25 mg tablet 2/1/2025 Bedtime Self   Sig: Take 1 tablet (6.25 mg) by mouth 2 times a day.   colchicine 0.6 mg tablet 2/1/2025 Bedtime Self   Sig: Take 1 tablet (0.6 mg) by mouth once daily.   folic acid (Folvite) 1 mg tablet 2/1/2025 Bedtime Self   Sig: Take 1 tablet (1 mg) by mouth once daily.   losartan (Cozaar) 25 mg tablet 2/1/2025 Bedtime Self   Sig: Take 1 tablet (25 mg) by mouth once daily.   multivitamin with minerals tablet 2/1/2025 Bedtime Self   Sig: Take 1 tablet by mouth once daily.   nitroglycerin (Nitrostat) 0.4 mg SL tablet 2/1/2025 Bedtime Self   Sig: Place 1 tablet (0.4 mg) under the tongue every 5 minutes if needed for chest pain.   thiamine (Vitamin B-1) 100 mg tablet 2/1/2025 Bedtime Self   Sig: Take 1 tablet (100 mg) by mouth once daily. Do not start before January 30, 2025.      Facility-Administered Medications: None        The list below reflects the updated allergy list. Please review each documented allergy for additional clarification and justification.  Allergies  Reviewed by Latonia Leslie on 2/2/2025   No Known Allergies         Patient accepts M2B at discharge.     Sources:   [facility]  Pharmacy dispense history  Patient interview Good historian     Additional  "Comments:  N/A      BRIANNA TRAN  Pharmacy Technician  02/02/25     Secure Chat preferred   If no response call [phone] or Vocera \"Med Rec\"   "

## 2025-02-02 NOTE — SIGNIFICANT EVENT
Senior Staffing Note    HPI:  45 y/o M with PMHx of cocaine abuse, hypertensive emergency with flash pulmonary edema, HFrEF (EF 37% 1/2025), HTN, T2DM (A1c 6.9%), DLD, SI/HI, and tobacco abuse, recent admission for poorly controlled HTN (left AMA) presenting again with similar symptoms chest pain and bilateral foot pain.     Patient was admitted earlier this week for similar complaints of chest pain and foot pain, his BP was significantly elevated on admission (SBP>200s) in the setting of cocaine use, his TTE showed an EF of 47% with no RWMA, on his third day of admission he was noted to be tachycardic and hypertensive so he was recommended to remain in the hospital but left AMA.    Today, patient was sleeping comfortably when I entered the room. He was drowsy during exam but answering questions appropriately.  he reports that he has been having bilateral foot pain since his discharge AMA, and has been taking colchicine once a day, but his pain worsened today, after which he started to experience chest sharp pain which did not radiate. He said the pain is similar in nature to the pain he had last time he was here. He states he has not used cocaine in more than a week.     He denies any fever or cough, unsure if he had any chills,     ED Course:  VS: T 36  /185(improved to 169/121)  RR 18 O2 95% on RA    Labs:  CBC WBC 16 Hb 15 Plt 223  RFP Na 141 K 3.6 Cl 107 HCO3 23 BUN 24 SCr 1.27   Troponin 248->234  Uric acid 8.0    Imaging:   EKG sinus tachycardia with no ST changes  CTA chest:   1. No thoracic or abdominal aortic aneurysm or acute aortic pathology.  2. There is diffuse smooth interstitial septal thickening  bilaterally, along with associated patchy alveolar ground-glass  opacities showing dependent gradient, likely representing combination  of interstitial and alveolar pulmonary edema. Atypical infectious  process can result in similar imaging findings and cannot be excluded  in appropriate  clinical context.  3. Moderate cardiomegaly.    Interventions:   ASA x325  Heparin gtt  Losartan 25mg x1  Moprhine 4mg IV x1  Carvedilol 6.25mg x1  Meloxicam 15mg x1  Colchicine 0.6mg x1  Pantoprazole 40mg x1      Cardiac Hx:  Echocardiography:  1. The left ventricular systolic function is mildly decreased, with a Brown's biplane calculated ejection fraction of 47%.   2. Spectral Doppler shows a Grade III (restrictive pattern) of left ventricular diastolic filling with an elevated left atrial pressure.   3. There is severely increased septal and severely increased posterior left ventricular wall thickness.   4. There is severely increased concentric left ventricular hypertrophy.   5. There is normal right ventricular global systolic function.   6. Mildly enlarged right ventricle.   7. The left atrium is severely dilated.   8. The right atrium is moderately dilated.   9. Mildly elevated right ventricular systolic pressure.  10. Left Ventricular Global Longitudinal Strain - 6.5 %.    Nuclear stress test 9/2024:  Normal pharmacological myocardial perfusion study. Findings suggest a low risk of cardiac events.       Exam:  General: sleeping, arousable and answering questions apprioprately  Cardiac: RRR, normal S1, S2, no M/R/G  Pulm: CTAB, normal respiratory effort, on room air  Abdomen: soft, ND, NT, no involuntary guarding or rebound tenderness  EXT: no peripheral edema, no asymmetry noted  MSK: bilateral MTP L>R swelling and tenderness to palpation  Neuro: AOx3, able to follow commands, no gross FND      Assessment and Plan:  43 y/o M with PMHx of cocaine abuse, hypertensive emergency with flash pulmonary edema, HFrEF (EF 37%, 45% 1/2025), HTN, T2DM (A1c 6.9%), DLD, SI/HI, and tobacco abuse, recent admission for poorly controlled HTN (left AMA) presenting again with similar symptoms chest pain and bilateral foot pain.  His ECG is unchanged, troponin elevated 240s but trended down, likely demand as his BP was 240s  on admission, will optimize blood pressure management. It seems like he still has poorly controlled HTN in the setting of cocaine use. He also has an elevated WBC this admission, questionable opacities on CT chest but no cough or fevers, this elevation could be in the setting of acute gout flare, low concern for septic arthritis given that his joint tap was sterile 1/27.      #Chest pain  #Cocaine  ::in the setting of severe blood pressure elevation and positive cocaine UDS (UDS usually negative if not used in the past 5 days)  ::likely type 2, he has had a negative stress test 3 months ago  ::No new ECG changes, Trop 248->234  -continue to monitor, type 2 likely so will defer anticoagulation    #HTN  -resume home carvedilol and losartan  -PRN hydralazine for SBP> 180    #HFmrEF  ::TTE 1/2025 EF 47%, no RWMA  ::Stress test 9/2024 with no inducible ischemia  ::appears dry on exam  ::Likely nonischemic due to cocaine use, further ischemic eval deferred last admission given no RWMA on echo, which is reasonable as NICM seems more likely given lack of RWMA, and this patient is not a good candidate for any intervention that would require medication compliance  -c/w losartan and carvedilol, consider adding additional GDMT (SGLT-2, MRA)      #Leukocytosis  ::Patient noted to have WBC of 16, his CT showed reports questionable opacities but the patient denies any cough or fevers  :: L MTP arthrocentesis from 1/27 sterile tap, low concern for septic arthritis  ::Patient with likely current gout flare which can cause leukocytosis  -will trend CBC, obtain UA, consider obtaining blood cultures if concern for infection persists but for now his only indicator is the elevated white count, since his joint tap was sterile  -Low threshold to culture if WBC increases, fever or hypotension    #Foot pain  #Gout  ::Patient reports that this is where he typically experiences gout episodes  ::MTP is typical for gout, although polyarticular  involvement is less common  ::Uric acid 8  -daily colchicine 0.6mg with no improvement  -will increase colchicine dose (1.8mg load today, then 0.6mg BID)  -If no improvement by tomorrow, can add prednisone taper (my suspicion for an infection remains low, but will have low threshold to discontinue), or NSAIDs     #Alcohol use  ::does not appear to be in withdrawal, BP and HR improved in the ED without benzos  ::last drink 2/1  ::denies any prior withdrawal symptoms or seizures  -consider CIWA if symptoms of withdrawal develop       DVT ppx: lovenox  Code: Full Code    To be formally staffed in the AM    Wilfredo Smallwood MD  Internal Medicine PGY-3

## 2025-02-02 NOTE — ED PROVIDER NOTES
CC: Bilateral Foot Pain      History provided by: Patient  Limitations to History: None    HPI:  Patient is a 44-year-old male with history of substance use disorder, hypertensive emergency complicated by flash pulmonary edema, heart failure with reduced EF, hypertension, type 2 diabetes, DLD, SI, HI, tobacco use disorder who presents with multiple medical complaints.  He is endorsing chest pain that is midsternal and nonradiating that began earlier this morning.  He states he last took his home medications last night.  He also endorses bilateral toe pain, left greater than right.  He states he has gout and colchicine is not working anymore.  He also states he has difficult to control high blood pressure.  He states he has not had any alcohol, drugs such as cocaine in the last 8 to 9 days.  He is not tremorous, he is following questions and commands appropriately, does not believe he is withdrawing.  Denies any recent fevers, chills, syncopal episodes, trauma.  He denies any shortness of breath, leg swelling.    I reviewed discharge summary from January 28, 2025 when patient was admitted for chest pain.  On January 28, 2025, patient left AGAINST MEDICAL ADVICE with notable elevated blood pressures and heart rate while admitted.  Patient was also noted to have left hallux gout pain at this time.  I reviewed TTE from January 27, 2025, patient had mildly decreased LV systolic function with EF 47%, elevated left atrial pressure, increased left ventricular wall thickness, normal RV systolic function, elevated RVSP.    External Records Reviewed:  I reviewed prior ED visits, Care Everywhere, discharge summaries and outpatient records as appropriate.   ???????????????????????????????????????????????????????????????  Triage Vitals:  T 36.1 °C (97 °F)  HR (!) 120  BP (!) 242/185  RR 18  O2 95 % None (Room air)    Physical Exam  Vitals and nursing note reviewed.   Constitutional:       General: He is not in acute  distress.     Appearance: Normal appearance.   HENT:      Head: Normocephalic and atraumatic.   Eyes:      Conjunctiva/sclera: Conjunctivae normal.   Cardiovascular:      Rate and Rhythm: Normal rate and regular rhythm.   Pulmonary:      Effort: Pulmonary effort is normal. No respiratory distress.   Abdominal:      General: Abdomen is flat.      Palpations: Abdomen is soft.   Musculoskeletal:         General: Normal range of motion.      Cervical back: Normal range of motion and neck supple.      Comments: 2+ bilateral DP pulses, foot is warm perfused, left big toe tophi greater than right big toe tophi, no surrounding tracking erythema, trace bilateral pedal edema   Skin:     General: Skin is warm and dry.   Neurological:      General: No focal deficit present.      Mental Status: He is alert and oriented to person, place, and time. Mental status is at baseline.   Psychiatric:         Mood and Affect: Mood normal.         Behavior: Behavior normal.        ???????????????????????????????????????????????????????????????  ED Course/Treatment/Medical Decision Making  MDM:  Patient is a 44-year-old male who presents with toe pain and chest pain.  Vital signs notable for tachycardia and hypertension, likely in the setting of chronic hypertension as well as pain.  Patient has known gout with negative synovial fluid study from January 26, 2025 similar presentation.  I have low suspicion for septic joint, patient will be treated with colchicine and IV analgesics for gout.  Will trial colchicine as well.  Per chart review on prior admission approximately 4 days ago, patient was given metoprolol 5 mg, lisinopril and Dilaudid with improvement in hypertension and tachycardia.  Home medications appear to be Lipitor 80 mg, Coreg 6.25 mg, losartan 25 mg, nitroglycerin, colchicine 0.6 mg.  Patient does have a moderate risk heart score and given patient left AGAINST MEDICAL ADVICE from prior admission I do believe he warrants  readmission and he was agreeable at this time.  He does not appear tremulous, no signs of trauma, is mentating appropriately, I do not believe he is withdrawing from substances acutely.  Cardiac workup initiated, patient will be given home antihypertensives and monitor as well as aspirin load and nitroglycerin if needed.  Other differentials considered include pulmonary embolism, DVT, acute limb ischemia, CHF exacerbation which I have a low suspicion for at this time. Uric acid was also ordered. Meloxicam and PPI ordered as well.      ED Course:  ED Course as of 02/02/25 1402   Sun Feb 02, 2025   0725 EKG reviewed sinus tachycardia rate 120, NM interval 160 ms, QRS 82 ms, QTc 486 ms, biatrial enlargement, no acute ST segment elevations or depressions, appears similar when compared to January 26, 2025 [SA]   0730 I reviewed synovial fluid results from 1/26/2025:   (2+) Few Polymorphonuclear leukocytes [SA]   0957 CXR reviewed:  IMPRESSION:  1.  Enlarged cardiomediastinal silhouette with lower lung predominant  hazy opacities suggests pulmonary edema. Correlate with patient's  volume status.   [SA]   0957 Uric acid is elevated at 8, BMP overall within normal limits, CBC  with WBC count 16.4 without clear etiology, left shift is present, hemoglobin 15.7, troponin is 248, delta troponin obtained [SA]   1010 Patient continues to endorse chest pain, EKG repeated, morphine initiated, nitroglycerin withheld as patient has already had an approximate 20% decrease in SBP [SA]   1015 Heparin initiated due to concern for NSTEMI [SA]   1031 ECG 12 lead  ECG, per my read, with normal sinus rhythm, heart rate 107 bpm, normal axis, prolonged QTC, otherwise normal intervals, no T wave inversions, no ST segment abnormalities. No change from prior ECG one hour prior. [MB]   1038 Medicine recommends CTA C/A/P, will hold heparin until CTA is done, I overall have a low clinical suspicion for acute aortic pathology, PRN labetalol IV  initiated with goal SBP ~170 and hold if HR<70 [SA]   1252 Troponin downtrended to 234 [SA]   1352 CT prelim reviewed:  IMPRESSION:  1. No thoracic or abdominal aortic aneurysm or acute aortic pathology.  2. There is diffuse smooth interstitial septal thickening  bilaterally, along with associated patchy alveolar ground-glass  opacities showing dependent gradient, likely representing combination  of interstitial and alveolar pulmonary edema. Atypical infectious  process can result in similar imaging findings and cannot be excluded  in appropriate clinical context.  3. Moderate cardiomegaly.       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    Differential diagnoses considered include but are not limited to: See MDM/Below:    Social Determinants Limiting Care:  None identified The following actions were taken to address these social determinants:      Discussion of Management with Other Providers: See MDM/Below:    Disposition:  Admitted    Erika Whipple, JAVI, PGY-3    I reviewed the case with the attending ED physician. The attending ED physician agrees with the plan. Patient and/or patient´s representative was counseled regarding labs, imaging, likely diagnosis, and plan. All questions were answered.    Disclaimer: This note was dictated by speech recognition.  Attempt at proofreading was made to minimize errors.  Errors in transcription may be present.   Please call if questions.    Procedures ? SmartLinks last updated 2/2/2025 2:02 PM        Erika Whipple, DO  Resident  02/02/25 [id number]

## 2025-02-02 NOTE — ED TRIAGE NOTES
Pt reports bilateral 10/10 chest pain and bilateral foot pain that he has had for 2 days, pt also reports left sided chest pain pt has a PMHx of HTN he reports compliance with his BP meds and gout medications pt is hypertensive with SBP.>200

## 2025-02-02 NOTE — H&P
"MEGHAN Moran is a 44 y.o. male with PMH of polysubstance use disorder, subarachnoid hemorrhage, mood disorder, HTN, diabetes, gout, SI and HI, and hypertensive emergency with flash pulmonary edema who presents to the ED on 2/2/25 for chest pain, bilateral foot pain, and hypertension. Of note, he was recently admitted to inpatient cardiology and left against medical advice last week on 1/28/25 with notably elevated BP and HR.     Per chart review, patient has had multiple ED visits and admissions within the last year related to chest pain, hypertension, and substance use. During his most recent admission, patient endorsed 10/10 chest pain and had BP elevated to 196/143 with max SBP in 220s. Trops were trended to peak 156 -> 143 -> 127. EKG with no obvious ischemic changes. BP was controlled with losartan 25 daily and coreg 6.25 BID. He was placed on CIWA d/t concerns for cocaine withdrawal but scores remained at 0. On the anticipated day of discharge 1/28, his BP and HR was elevated with c/f withdrawal, so plan was to delay discharge and monitor him. However, patient refused to stay longer and left AMA on 1/28.    Today, patient presented to the ED with a similar presentation of midsternal chest pain that started this morning. During the interview, patient appeared drowsy and dozed in and out of sleep. He states that the pain feels similar to his pain last week, is currently 10/10. He noted some numbness down his left arm but denied any radiation of his pain. He reports taking his medications last night as prescribed. Today he is also endorsing bilateral toe pain for which colchicine has not been helping. He states that \"nothing\" has helped during gout flares in the past. This pain began at the time he had left AMA last week and has persisted, getting worse over time. Worse on his left hallux compared to his right. States this left hallux is normally where he has gout flares. Reports taking his medications as " prescribed since discharge. Additionally endorses some lightheadedness, occasional blurry vision, and vomiting once yesterday morning. Drank 3 beers yesterday, no hx of withdrawal sx. He tobacco or other drug use within the last week. He denies any shortness of breath, cough, fevers/chills, recent travel, sick contacts, or abd pain.    12 point ROS otherwise negative, unless indicated above.    In the ED, patient's BP was 242/185 on presentation. He received 10mg labetalol, losartan 25, and coreg 6.25mg with improvement in his BP to 169/121. He also received ASA 324mg and was started on heparin gtt. Colchicine 0.6mg, Meloxicam and IV morphine for pain which provided some relief. Labs were notable for WBC 16, troponin 248 -> 234 -> 207, uric acid 8 (H), and utox positive for cocaine and opiates (+ opiates likely from morphine). EKG with no ST changes. CTA chest with no aortic pathology and some GGOs which could not exclude atypical infectious process. Patient was admitted to general cardiology for further management of hypertensive urgency.    Cardiac Hx:    EKG 2/2/2025: sinus tachycardia, biatrial enlargement    TTE 1/27/2025:  CONCLUSIONS:   1. The left ventricular systolic function is mildly decreased, with a Brown's biplane calculated ejection fraction of 47%.   2. Spectral Doppler shows a Grade III (restrictive pattern) of left ventricular diastolic filling with an elevated left atrial pressure.   3. There is severely increased septal and severely increased posterior left ventricular wall thickness.   4. There is severely increased concentric left ventricular hypertrophy.   5. There is normal right ventricular global systolic function.   6. Mildly enlarged right ventricle.   7. The left atrium is severely dilated.   8. The right atrium is moderately dilated.   9. Mildly elevated right ventricular systolic pressure.  10. Left Ventricular Global Longitudinal Strain - 6.5 %.    TTE 1/20/2018:  CONCLUSIONS:   1.  The left ventricular systolic function is hyperdynamic with a 75-80% estimated ejection fraction.   2. Echocardiographic findings are consistent with hypertensive heart disease.   3. Spectral Doppler shows an impaired relaxation pattern of left ventricular diastolic filling.   4. There is severe concentric left ventricular hypertrophy.    PMH: as above   Allergies: none  SocHx: lives with wife, drinks 3 beers daily on weekends, last cocaine use 8-9 days ago, no tobacco  FamHx: noncontributory     Home Medications:  Albuterol inhaler PRN  ASA 81  Atorvastatin 80 qhs  Coreg 6.25 BID  Colchicine 0.6 daily  Folic acid 1mg daily  Losartan 25mg daily  Multivitamin  Nitroglycerin PRN  Thiamine 100 once daily    Objective:    24 Hour Vitals  Temp:  [36.1 °C (97 °F)-36.3 °C (97.3 °F)] 36.3 °C (97.3 °F)  Heart Rate:  [] 103  Resp:  [7-20] 16  BP: (162-242)/(118-193) 162/131    Temp (24hrs), Av.2 °C (97.2 °F), Min:36.1 °C (97 °F), Max:36.3 °C (97.3 °F)     24 hour Intake/Output  No intake or output data in the 24 hours ending 25     Exam:  General: Somnolent but arousable. Appears stated age. In no acute distress   HEENT: Normocephalic and atraumatic. EOMI, sclera non-icteric, MMM, no JVD  Cardiovascular: RRR, no r/m/g  Pulmonary: Lungs clear to auscultation bilaterally. No wheezes/ rhonchi/ rales   GI: +BS, soft, non-tender, non-distended  : No suprapubic/ flank pain  Extremities: No LE edema   Skin: Bilateral MTP joint swelling, tenderness, and erythema (more on left than right)  Neurologic: No focal neurologic deficit, A&Ox3  Psych: Pleasant. Appropriate mood and affect     Labs:  CBC  Results from last 72 hours   Lab Units 25  1716 25  0813   WBC AUTO x10*3/uL 11.6* 16.4*   HEMOGLOBIN g/dL 14.8 15.7   HEMATOCRIT % 43.2 47.2   PLATELETS AUTO x10*3/uL 201 223        BMP  Results from last 72 hours   Lab Units 25  0813   SODIUM mmol/L 141   POTASSIUM mmol/L 3.6   CHLORIDE mmol/L 107    BUN mg/dL 24*   CREATININE mg/dL 1.27     CRP: 0.76  ESR: 15  Uric acid: 8.0 (H)  Troponins 248 -> 234 -> 207    Utox positive for cocaine and opiates (received morphine in ED)  UA negative    Imaging:  CT angio C/A/P 2/2:    1. No thoracic or abdominal aortic aneurysm or acute aortic pathology.  2. There is diffuse smooth interstitial septal thickening  bilaterally, along with associated patchy alveolar ground-glass  opacities showing dependent gradient, likely representing combination  of interstitial and alveolar pulmonary edema. Atypical infectious  process can result in similar imaging findings and cannot be excluded  in appropriate clinical context.  3. Moderate cardiomegaly.    CXR 2/2:    IMPRESSION:  1.  Enlarged cardiomediastinal silhouette with lower lung predominant  hazy opacities suggests pulmonary edema. Correlate with patient's  volume status.    Medications   Scheduled Medications  [START ON 2/3/2025] aspirin, 81 mg, oral, Daily  atorvastatin, 80 mg, oral, Nightly  carvedilol, 6.25 mg, oral, BID  [START ON 2/3/2025] colchicine, 0.6 mg, oral, BID  enoxaparin, 40 mg, subcutaneous, q24h  [START ON 2/3/2025] folic acid, 1 mg, oral, Daily  [START ON 2/3/2025] losartan, 25 mg, oral, Daily  pantoprazole, 40 mg, oral, Daily before breakfast  [START ON 2/3/2025] thiamine, 100 mg, oral, Daily     Continuous Medications      PRN Medications  PRN medications: acetaminophen, hydrALAZINE, nitroglycerin, oxyCODONE     Assessment/Plan:    Theron Moran is a 44 y.o. male with PMH of polysubstance use disorder, subarachnoid hemorrhage, mood disorder, HTN, diabetes, gout, SI and HI, and hypertensive emergency with flash pulmonary edema who presents to the ED on 2/2/25 for chest pain, bilateral foot pain, and hypertension. Of note, he was recently admitted to inpatient cardiology and left against medical advice last week on 1/28/25 with notably elevated BP and HR. Patient's presentation sounds similar to his last ED  appearance. He stated that he has not had any cocaine for the last week but his utox today was positive for cocaine, which is the most likely explanation for his the extreme hypertension and drowsiness. Low concern for ACS d/t negative EKG and downtrending trops, pt was started on heparin in the ED however will discontinue with close monitoring overnight for any ACS symptoms. Patient stated that he takes his medications as prescribed however could not remember their names. His toe pain is characteristic of a gout flare, although bilateral pain would be unexpected. Nevertheless patient stated this feels like an intense gout flare and colchicine has helped somewhat for the pain. Low concern for septic arthritis, patient had arthrocentesis during admission last week which was sterile. He does have a leukocytosis with WBC to 16.4 however it has since downtrended to 11.6. No fevers or chills. Will continue to optimize BP management while inpatient and control pain.    #Chest pain  #Troponinemia  #Hypertensive urgency  :: Troponins 248 -> 234 -> 207, likely d/t demand ischemia from hypertensive urgency  :: EKG negative for ischemic changes  :: Utox positive for cocaine despite patient reporting no use for 8-9 days, should not still be positive if that is true -> most likely cause of hypertensive urgency combined with possible medication nonadherence  :: Low concern for ACS  Plan:  - Stop heparin gtt  - Continue ASA 81  - Continue home carvedilol 6.25 BID  - Continue home losartan 25 daily  - PRN hydralazine for SBP >180    #HFmrEF  :: TTE 1/2025 with EF 47%, no regional wall motion abnormalities  :: Stress test with no inducible ischemia in 9/2024  :: Likely d/t nonischemic cardiomyopathy  Plan:  - Continue losartan and carvedilol  - Consider additional GDMT as tolerated    #Acute gout flare  :: Typical location for patients gout flares  :: Uric acid 8.0  Plan:  - Colchicine 1.8mg today then 0.6mg BID  - Tylenol 975 q8  PRN  - Oxycodone 5 q8 PRN  - Consider prednisone taper    #Leukocytosis  :: WBC 16 on admission down to 11.6 when tested later in the day  :: Sterile tap of left MTP during admission last week, low concern for septic arthritis  :: CT C/A/P with some GGOs, unable to exclude atypical infection  :: WBC likely elevated in setting of gout flare  Plan:  - Low threshold to start abx  - Daily CBCs  - UA    #Polysubstance use  #Alcohol use  :: Utox +ve for cocaine today and when admitted last week  :: No hx of withdrawal seizures or tremors  :: Last drink 2/1, low concern for withdrawal  :: Pt was starting to show signs of cocaine withdrawal (HTN, tachycardia) at end of last admission which prompted need to keep him admitted, however he left AMA because he stated his basement had flooded  Plan:  - Consider CIWA  - Discuss drug cessation resources    F: PRN for euvolemia  E: PRN K>4, Mg>2, P>3  N: regular diet  A: PIV    Oxygen: none  Abx: none  Gtts: none    DVT ppx: lovenox  GI ppx: protonix 40    Code Status: Full Code (confirmed on admission)  Surrogate Medical Decision-maker:  Julio César Celestin (relative) [phone]     To be staffed with the attending on service Dr. Grissom tomorrow morning.    Dexter Herring MD  Internal Medicine PGY-1

## 2025-02-03 LAB
ALBUMIN SERPL BCP-MCNC: 3.4 G/DL (ref 3.4–5)
ANION GAP SERPL CALC-SCNC: 13 MMOL/L (ref 10–20)
ATRIAL RATE: 120 BPM
BASOPHILS # BLD AUTO: 0.05 X10*3/UL (ref 0–0.1)
BASOPHILS NFR BLD AUTO: 0.5 %
BUN SERPL-MCNC: 27 MG/DL (ref 6–23)
CALCIUM SERPL-MCNC: 8.7 MG/DL (ref 8.6–10.6)
CHLORIDE SERPL-SCNC: 112 MMOL/L (ref 98–107)
CO2 SERPL-SCNC: 23 MMOL/L (ref 21–32)
CREAT SERPL-MCNC: 1.44 MG/DL (ref 0.5–1.3)
EGFRCR SERPLBLD CKD-EPI 2021: 61 ML/MIN/1.73M*2
EOSINOPHIL # BLD AUTO: 0.4 X10*3/UL (ref 0–0.7)
EOSINOPHIL NFR BLD AUTO: 3.9 %
ERYTHROCYTE [DISTWIDTH] IN BLOOD BY AUTOMATED COUNT: 13.8 % (ref 11.5–14.5)
GLUCOSE BLD MANUAL STRIP-MCNC: 143 MG/DL (ref 74–99)
GLUCOSE BLD MANUAL STRIP-MCNC: 161 MG/DL (ref 74–99)
GLUCOSE BLD MANUAL STRIP-MCNC: 179 MG/DL (ref 74–99)
GLUCOSE SERPL-MCNC: 127 MG/DL (ref 74–99)
HCT VFR BLD AUTO: 42.9 % (ref 41–52)
HGB BLD-MCNC: 13.8 G/DL (ref 13.5–17.5)
IMM GRANULOCYTES # BLD AUTO: 0.05 X10*3/UL (ref 0–0.7)
IMM GRANULOCYTES NFR BLD AUTO: 0.5 % (ref 0–0.9)
LYMPHOCYTES # BLD AUTO: 1.69 X10*3/UL (ref 1.2–4.8)
LYMPHOCYTES NFR BLD AUTO: 16.5 %
MAGNESIUM SERPL-MCNC: 1.78 MG/DL (ref 1.6–2.4)
MCH RBC QN AUTO: 26 PG (ref 26–34)
MCHC RBC AUTO-ENTMCNC: 32.2 G/DL (ref 32–36)
MCV RBC AUTO: 81 FL (ref 80–100)
MONOCYTES # BLD AUTO: 1.03 X10*3/UL (ref 0.1–1)
MONOCYTES NFR BLD AUTO: 10 %
NEUTROPHILS # BLD AUTO: 7.03 X10*3/UL (ref 1.2–7.7)
NEUTROPHILS NFR BLD AUTO: 68.6 %
NRBC BLD-RTO: 0 /100 WBCS (ref 0–0)
P AXIS: 65 DEGREES
P OFFSET: 202 MS
P ONSET: 140 MS
PHOSPHATE SERPL-MCNC: 3.8 MG/DL (ref 2.5–4.9)
PLATELET # BLD AUTO: 191 X10*3/UL (ref 150–450)
POTASSIUM SERPL-SCNC: 3.6 MMOL/L (ref 3.5–5.3)
PR INTERVAL: 160 MS
Q ONSET: 220 MS
QRS COUNT: 20 BEATS
QRS DURATION: 82 MS
QT INTERVAL: 344 MS
QTC CALCULATION(BAZETT): 486 MS
QTC FREDERICIA: 433 MS
R AXIS: 75 DEGREES
RBC # BLD AUTO: 5.3 X10*6/UL (ref 4.5–5.9)
SODIUM SERPL-SCNC: 144 MMOL/L (ref 136–145)
T AXIS: 80 DEGREES
T OFFSET: 392 MS
VENTRICULAR RATE: 120 BPM
WBC # BLD AUTO: 10.3 X10*3/UL (ref 4.4–11.3)

## 2025-02-03 PROCEDURE — 2500000001 HC RX 250 WO HCPCS SELF ADMINISTERED DRUGS (ALT 637 FOR MEDICARE OP)

## 2025-02-03 PROCEDURE — 99223 1ST HOSP IP/OBS HIGH 75: CPT | Performed by: INTERNAL MEDICINE

## 2025-02-03 PROCEDURE — 82947 ASSAY GLUCOSE BLOOD QUANT: CPT

## 2025-02-03 PROCEDURE — 2500000002 HC RX 250 W HCPCS SELF ADMINISTERED DRUGS (ALT 637 FOR MEDICARE OP, ALT 636 FOR OP/ED)

## 2025-02-03 PROCEDURE — 1100000001 HC PRIVATE ROOM DAILY

## 2025-02-03 PROCEDURE — 83735 ASSAY OF MAGNESIUM: CPT

## 2025-02-03 PROCEDURE — 36415 COLL VENOUS BLD VENIPUNCTURE: CPT

## 2025-02-03 PROCEDURE — 80069 RENAL FUNCTION PANEL: CPT

## 2025-02-03 PROCEDURE — 2500000004 HC RX 250 GENERAL PHARMACY W/ HCPCS (ALT 636 FOR OP/ED)

## 2025-02-03 PROCEDURE — 85025 COMPLETE CBC W/AUTO DIFF WBC: CPT

## 2025-02-03 RX ORDER — METOPROLOL TARTRATE 1 MG/ML
5 INJECTION, SOLUTION INTRAVENOUS ONCE AS NEEDED
Status: DISCONTINUED | OUTPATIENT
Start: 2025-02-04 | End: 2025-02-04

## 2025-02-03 RX ORDER — LOSARTAN POTASSIUM 25 MG/1
25 TABLET ORAL ONCE
Status: COMPLETED | OUTPATIENT
Start: 2025-02-03 | End: 2025-02-03

## 2025-02-03 RX ORDER — METOPROLOL TARTRATE 50 MG/1
100 TABLET ORAL ONCE
Status: COMPLETED | OUTPATIENT
Start: 2025-02-04 | End: 2025-02-04

## 2025-02-03 RX ORDER — LORAZEPAM 2 MG/ML
0.5 INJECTION INTRAMUSCULAR EVERY 5 MIN PRN
Status: DISCONTINUED | OUTPATIENT
Start: 2025-02-04 | End: 2025-02-04

## 2025-02-03 RX ORDER — NAPROXEN SODIUM 220 MG/1
81 TABLET, FILM COATED ORAL DAILY
Qty: 30 TABLET | Refills: 0 | Status: CANCELLED | OUTPATIENT
Start: 2025-02-04 | End: 2025-03-06

## 2025-02-03 RX ORDER — CARVEDILOL 12.5 MG/1
12.5 TABLET ORAL 2 TIMES DAILY
Qty: 60 TABLET | Refills: 0 | Status: CANCELLED | OUTPATIENT
Start: 2025-02-03 | End: 2025-03-05

## 2025-02-03 RX ORDER — LOSARTAN POTASSIUM 50 MG/1
50 TABLET ORAL DAILY
Status: DISCONTINUED | OUTPATIENT
Start: 2025-02-04 | End: 2025-02-04

## 2025-02-03 RX ORDER — METOPROLOL TARTRATE 1 MG/ML
5 INJECTION, SOLUTION INTRAVENOUS ONCE AS NEEDED
Status: DISCONTINUED | OUTPATIENT
Start: 2025-02-03 | End: 2025-02-03

## 2025-02-03 RX ORDER — LOSARTAN POTASSIUM 50 MG/1
50 TABLET ORAL DAILY
Qty: 30 TABLET | Refills: 0 | Status: CANCELLED | OUTPATIENT
Start: 2025-02-04 | End: 2025-03-06

## 2025-02-03 RX ORDER — NITROGLYCERIN 0.4 MG/1
0.8 TABLET SUBLINGUAL ONCE
Status: DISCONTINUED | OUTPATIENT
Start: 2025-02-03 | End: 2025-02-03

## 2025-02-03 RX ORDER — METOPROLOL TARTRATE 1 MG/ML
5 INJECTION, SOLUTION INTRAVENOUS ONCE
Status: DISCONTINUED | OUTPATIENT
Start: 2025-02-04 | End: 2025-02-04

## 2025-02-03 RX ORDER — CARVEDILOL 12.5 MG/1
12.5 TABLET ORAL 2 TIMES DAILY
Status: DISCONTINUED | OUTPATIENT
Start: 2025-02-03 | End: 2025-02-04

## 2025-02-03 RX ORDER — METOPROLOL TARTRATE 50 MG/1
100 TABLET ORAL ONCE AS NEEDED
Status: DISCONTINUED | OUTPATIENT
Start: 2025-02-03 | End: 2025-02-03

## 2025-02-03 RX ORDER — PANTOPRAZOLE SODIUM 40 MG/1
40 TABLET, DELAYED RELEASE ORAL
Qty: 30 TABLET | Refills: 0 | Status: CANCELLED | OUTPATIENT
Start: 2025-02-04 | End: 2025-03-06

## 2025-02-03 RX ORDER — MAGNESIUM SULFATE HEPTAHYDRATE 40 MG/ML
2 INJECTION, SOLUTION INTRAVENOUS ONCE
Status: COMPLETED | OUTPATIENT
Start: 2025-02-03 | End: 2025-02-03

## 2025-02-03 RX ORDER — DEXTROSE 50 % IN WATER (D50W) INTRAVENOUS SYRINGE
25
Status: DISCONTINUED | OUTPATIENT
Start: 2025-02-03 | End: 2025-02-07 | Stop reason: HOSPADM

## 2025-02-03 RX ORDER — DICLOFENAC SODIUM 50 MG/1
50 TABLET, DELAYED RELEASE ORAL 2 TIMES DAILY
Status: DISCONTINUED | OUTPATIENT
Start: 2025-02-03 | End: 2025-02-04

## 2025-02-03 RX ORDER — COLCHICINE 0.6 MG/1
0.6 TABLET ORAL 2 TIMES DAILY
Qty: 60 TABLET | Refills: 0 | Status: CANCELLED | OUTPATIENT
Start: 2025-02-03 | End: 2025-03-05

## 2025-02-03 RX ORDER — POTASSIUM CHLORIDE 20 MEQ/1
40 TABLET, EXTENDED RELEASE ORAL ONCE
Status: COMPLETED | OUTPATIENT
Start: 2025-02-03 | End: 2025-02-03

## 2025-02-03 RX ORDER — LORAZEPAM 2 MG/ML
0.5 INJECTION INTRAMUSCULAR EVERY 5 MIN PRN
Status: DISCONTINUED | OUTPATIENT
Start: 2025-02-03 | End: 2025-02-03

## 2025-02-03 RX ORDER — NITROGLYCERIN 0.4 MG/1
0.8 TABLET SUBLINGUAL ONCE
Status: DISCONTINUED | OUTPATIENT
Start: 2025-02-04 | End: 2025-02-04

## 2025-02-03 RX ORDER — DEXTROSE 50 % IN WATER (D50W) INTRAVENOUS SYRINGE
12.5
Status: DISCONTINUED | OUTPATIENT
Start: 2025-02-03 | End: 2025-02-07 | Stop reason: HOSPADM

## 2025-02-03 RX ORDER — INSULIN LISPRO 100 [IU]/ML
0-5 INJECTION, SOLUTION INTRAVENOUS; SUBCUTANEOUS
Status: DISCONTINUED | OUTPATIENT
Start: 2025-02-03 | End: 2025-02-04

## 2025-02-03 RX ORDER — METOPROLOL TARTRATE 1 MG/ML
5 INJECTION, SOLUTION INTRAVENOUS ONCE
Status: DISCONTINUED | OUTPATIENT
Start: 2025-02-03 | End: 2025-02-03

## 2025-02-03 RX ORDER — METOPROLOL TARTRATE 100 MG/1
100 TABLET ORAL ONCE AS NEEDED
Status: DISCONTINUED | OUTPATIENT
Start: 2025-02-04 | End: 2025-02-04

## 2025-02-03 RX ORDER — METOPROLOL TARTRATE 50 MG/1
100 TABLET ORAL ONCE
Status: DISCONTINUED | OUTPATIENT
Start: 2025-02-03 | End: 2025-02-03

## 2025-02-03 RX ADMIN — CARVEDILOL 6.25 MG: 6.25 TABLET, FILM COATED ORAL at 08:31

## 2025-02-03 RX ADMIN — CARVEDILOL 12.5 MG: 12.5 TABLET, FILM COATED ORAL at 21:01

## 2025-02-03 RX ADMIN — MAGNESIUM SULFATE HEPTAHYDRATE 2 G: 40 INJECTION, SOLUTION INTRAVENOUS at 20:59

## 2025-02-03 RX ADMIN — ACETAMINOPHEN 975 MG: 325 TABLET ORAL at 20:59

## 2025-02-03 RX ADMIN — FOLIC ACID 1 MG: 1 TABLET ORAL at 08:31

## 2025-02-03 RX ADMIN — PANTOPRAZOLE SODIUM 40 MG: 40 TABLET, DELAYED RELEASE ORAL at 06:43

## 2025-02-03 RX ADMIN — THIAMINE HCL TAB 100 MG 100 MG: 100 TAB at 08:31

## 2025-02-03 RX ADMIN — DICLOFENAC SODIUM 50 MG: 50 TABLET, DELAYED RELEASE ORAL at 11:44

## 2025-02-03 RX ADMIN — ATORVASTATIN CALCIUM 80 MG: 80 TABLET, FILM COATED ORAL at 20:59

## 2025-02-03 RX ADMIN — COLCHICINE 0.6 MG: 0.6 TABLET ORAL at 08:31

## 2025-02-03 RX ADMIN — LOSARTAN POTASSIUM 25 MG: 25 TABLET, FILM COATED ORAL at 11:44

## 2025-02-03 RX ADMIN — COLCHICINE 0.6 MG: 0.6 TABLET ORAL at 20:59

## 2025-02-03 RX ADMIN — POTASSIUM CHLORIDE 40 MEQ: 1500 TABLET, EXTENDED RELEASE ORAL at 21:01

## 2025-02-03 RX ADMIN — ASPIRIN 81 MG CHEWABLE TABLET 81 MG: 81 TABLET CHEWABLE at 08:31

## 2025-02-03 RX ADMIN — ENOXAPARIN SODIUM 40 MG: 100 INJECTION SUBCUTANEOUS at 20:59

## 2025-02-03 RX ADMIN — DICLOFENAC SODIUM 50 MG: 50 TABLET, DELAYED RELEASE ORAL at 20:59

## 2025-02-03 RX ADMIN — OXYCODONE 5 MG: 5 TABLET ORAL at 16:32

## 2025-02-03 RX ADMIN — LOSARTAN POTASSIUM 25 MG: 25 TABLET, FILM COATED ORAL at 08:31

## 2025-02-03 ASSESSMENT — ACTIVITIES OF DAILY LIVING (ADL): LACK_OF_TRANSPORTATION: NO

## 2025-02-03 ASSESSMENT — PAIN DESCRIPTION - DESCRIPTORS: DESCRIPTORS: ACHING

## 2025-02-03 ASSESSMENT — COLUMBIA-SUICIDE SEVERITY RATING SCALE - C-SSRS
2. HAVE YOU ACTUALLY HAD ANY THOUGHTS OF KILLING YOURSELF?: NO
6. HAVE YOU EVER DONE ANYTHING, STARTED TO DO ANYTHING, OR PREPARED TO DO ANYTHING TO END YOUR LIFE?: NO
1. SINCE LAST CONTACT, HAVE YOU WISHED YOU WERE DEAD OR WISHED YOU COULD GO TO SLEEP AND NOT WAKE UP?: NO

## 2025-02-03 ASSESSMENT — COGNITIVE AND FUNCTIONAL STATUS - GENERAL
WALKING IN HOSPITAL ROOM: A LITTLE
DAILY ACTIVITIY SCORE: 24
MOBILITY SCORE: 22
CLIMB 3 TO 5 STEPS WITH RAILING: A LITTLE

## 2025-02-03 ASSESSMENT — PAIN - FUNCTIONAL ASSESSMENT: PAIN_FUNCTIONAL_ASSESSMENT: 0-10

## 2025-02-03 ASSESSMENT — PAIN SCALES - GENERAL
PAINLEVEL_OUTOF10: 7
PAINLEVEL_OUTOF10: 0 - NO PAIN

## 2025-02-03 NOTE — CARE PLAN
The patient's goals for the shift include: for my blood pressure to be better.        The clinical goals for the shift include Pt will remain HD stable this shift.

## 2025-02-03 NOTE — PROGRESS NOTES
Readmission to hospital: Patient discharged from the hospital on 1/28 with discharge diagnosis: Hypertensive Emergency and Type II Myocardial Infarction. Readmitted to hospital on 2/2 with admission diagnosis: Resistant hypertension. Medical team aware of readmission.       02/03/25 1100   Discharge Planning   Living Arrangements Alone   Support Systems Family members   Assistance Needed no   Type of Residence Private residence   Home or Post Acute Services None   Expected Discharge Disposition Home   Does the patient need discharge transport arranged? No   Financial Resource Strain   How hard is it for you to pay for the very basics like food, housing, medical care, and heating? Not hard   Housing Stability   In the last 12 months, was there a time when you were not able to pay the mortgage or rent on time? N   At any time in the past 12 months, were you homeless or living in a shelter (including now)? N   Transportation Needs   In the past 12 months, has lack of transportation kept you from medical appointments or from getting medications? no   In the past 12 months, has lack of transportation kept you from meetings, work, or from getting things needed for daily living? No     TCC admission assessment completed. Explained role of TCC - verbalized understanding.     Demographics/Insurance: Confirmed in chart.   Living Environment: Lives alone. Independent in care.   Primary Support Person: Lara, [phone]  PCP: Dr. Nilson Dobson  Recent Falls: Denies   Assistive Devices/DME: Uses cane occasionally  Home Oxygen: Denies   Dialysis: Denies   Home Care Agency: Denies   Transportation at Discharge: May need transport at discharge.   Concerns about discharge: None at this time.     Huong Jaffe RN, TCC

## 2025-02-03 NOTE — PROGRESS NOTES
MEDICINE INPATIENT PROGRESS NOTE    Theron Moran is a 44 y.o. male on hospital day 1 who presented with a chief complaint of hypertensive urgency.    Subjective     NAEON. Patient seen resting comfortably but reports 8/10 bilateral toe pain, his chest pain has improved overnight. Currently NPO for possible procedure today. No other concerns or complaints at this time.       Objective     Last Recorded Vitals  Vitals:    02/03/25 0407 02/03/25 0830 02/03/25 1116 02/03/25 1509   BP: (!) 163/115 (!) 161/121 (!) 172/117 (!) 161/116   BP Location: Right arm Right arm Right arm Right arm   Patient Position: Lying Sitting Lying Lying   Pulse: 89 88 88 70   Resp: 19 19 20 18   Temp: 37 °C (98.6 °F) 37 °C (98.6 °F) 36.3 °C (97.3 °F) 36.8 °C (98.2 °F)   TempSrc: Temporal Temporal Temporal Temporal   SpO2: 97% 96% 98% 95%   Weight: 91.6 kg (201 lb 15.1 oz)      Height:           Intake/Output last 3 Shifts:  No intake/output data recorded.    Physical Exam  General: Somnolent but arousable. Appears stated age. In no acute distress   HEENT: Normocephalic and atraumatic. EOMI, sclera non-icteric, MMM, no JVD  GI: Soft, non-tender, non-distended  : No suprapubic/ flank pain  Extremities: No LE edema   Skin: Bilateral MTP joint swelling, tenderness, and erythema (more on left than right)  Neurologic: No focal neurologic deficit, A&Ox3  Psych: Pleasant. Appropriate mood and affect     Labs    CBC  Results from last 72 hours   Lab Units 02/02/25  1716 02/02/25  0813   WBC AUTO x10*3/uL 11.6* 16.4*   HEMOGLOBIN g/dL 14.8 15.7   HEMATOCRIT % 43.2 47.2   PLATELETS AUTO x10*3/uL 201 223      BMP  Results from last 72 hours   Lab Units 02/02/25  0813   SODIUM mmol/L 141   POTASSIUM mmol/L 3.6   CHLORIDE mmol/L 107   BUN mg/dL 24*   CREATININE mg/dL 1.27     LFTS      COAGS  Results from last 72 hours   Lab Units 02/02/25  1426   APTT seconds 33      Imaging  CT angio chest abdomen pelvis    Result Date: 2/2/2025  1. No thoracic or  abdominal aortic aneurysm or acute aortic pathology. 2. There is diffuse smooth interstitial septal thickening bilaterally, along with associated patchy alveolar ground-glass opacities showing dependent gradient, likely representing combination of interstitial and alveolar pulmonary edema. Atypical infectious process can result in similar imaging findings and cannot be excluded in appropriate clinical context. 3. Moderate cardiomegaly.     I personally reviewed the images/study and I agree with the findings as stated by Bishnu Tineo MD (Radiology Resident). This study was interpreted at [place], [place]. MACRO: None   Signed by: Derik Veliz 2/2/2025 4:46 PM Dictation workstation:   [id number]    XR chest 2 views    Result Date: 2/2/2025  1.  Enlarged cardiomediastinal silhouette with lower lung predominant hazy opacities suggests pulmonary edema. Correlate with patient's volume status.   I personally reviewed the images/study and I agree with the findings as stated by Bishnu Tineo MD (Radiology Resident). This study was interpreted at [place], [place].   MACRO: None   Signed by: Raymundo Zuniga 2/2/2025 8:51 AM Dictation workstation:   [id number]    Medications   Scheduled Medications  aspirin, 81 mg, oral, Daily  atorvastatin, 80 mg, oral, Nightly  carvedilol, 12.5 mg, oral, BID  colchicine, 0.6 mg, oral, BID  diclofenac, 50 mg, oral, BID  enoxaparin, 40 mg, subcutaneous, q24h  folic acid, 1 mg, oral, Daily  insulin lispro, 0-5 Units, subcutaneous, TID AC  [START ON 2/4/2025] losartan, 50 mg, oral, Daily  [START ON 2/4/2025] metoprolol, 5 mg, intravenous, Once  [START ON 2/4/2025] metoprolol tartrate, 100 mg, oral, Once  [START ON 2/4/2025] nitroglycerin, 0.8 mg, sublingual, Once  pantoprazole, 40 mg, oral, Daily before breakfast  thiamine, 100 mg, oral, Daily     Continuous  Medications    PRN Medications  PRN medications: acetaminophen, dextrose, dextrose, glucagon, glucagon, hydrALAZINE, [START ON 2/4/2025] LORazepam, [START ON 2/4/2025] metoprolol, [START ON 2/4/2025] metoprolol, [START ON 2/4/2025] metoprolol, [START ON 2/4/2025] metoprolol, [START ON 2/4/2025] metoprolol tartrate, nitroglycerin, oxyCODONE          Assessment/Plan     Theron Moran is a 44 y.o. male with PMH of polysubstance use disorder, subarachnoid hemorrhage, mood disorder, HTN, diabetes, gout, SI and HI, and hypertensive emergency with flash pulmonary edema who presents to the ED on 2/2/25 for chest pain, bilateral foot pain, and hypertension. Of note, he was recently admitted to inpatient cardiology and left against medical advice last week on 1/28/25 with notably elevated BP and HR. Patient's presentation sounds similar to his last ED appearance. He stated that he has not had any cocaine for the last week but his utox yesterday was positive for cocaine, which is the most likely explanation for his the extreme hypertension and drowsiness. Will continue to optimize BP management by increasing coreg and losartan. Plan for CTA coronaries tomorrow to exclude ischemic causes of chest pain. His chest pain has improved today, main complaint is now the gout pain. Will continue to monitor on colchicine and start additional therapy with NSAID.    Updates 2/3/25  - Utox positive for cocaine  - Plan for CTA coronaries tomorrow, NPO at midnight  - Increase coreg to 12.5 BID and losartan to 50mg daily  - BP remains elevated at SBP 150s-160s but has decreased since initial presentation  - Start PO diclofenac    #Chest pain  #Troponinemia  #Hypertensive urgency  :: Troponins 248 -> 234 -> 207, likely d/t demand ischemia from hypertensive urgency  :: EKG negative for ischemic changes  :: Utox positive for cocaine despite patient reporting no use for 8-9 days, should not still be positive if that is true -> most likely cause of  hypertensive urgency combined with possible medication nonadherence  :: Low concern for ACS  Plan:  - Will obtain CTA coronaries tomorrow to evaluate for ischemia  - Continue ASA 81  - Increase carvedilol to 12.5 BID  - Increase losartan to 50 daily  - PRN hydralazine for SBP >180     #HFmrEF  :: TTE 1/2025 with EF 47%, no regional wall motion abnormalities  :: Stress test with no inducible ischemia in 9/2024  :: Likely d/t nonischemic cardiomyopathy  Plan:  - Continue losartan and carvedilol  - Consider additional GDMT as tolerated     #Acute gout flare  :: Typical location for patients gout flares  :: Uric acid 8.0  Plan:  - Colchicine 0.6mg BID  - Tylenol 975 q8 PRN  - Oxycodone 5 q8 PRN  - Starting PO diclofenac 50 BID  - Consider prednisone taper     #Leukocytosis  :: WBC 16 on admission down to 11.6 when tested later in the day on 2/2  :: Sterile tap of left MTP during admission last week, low concern for septic arthritis  :: CT C/A/P with some GGOs, unable to exclude atypical infection  :: WBC likely elevated in setting of gout flare  :: Negative UA  Plan:  - Low threshold to start abx  - Daily CBCs (waiting on labs today)     #Polysubstance use  #Alcohol use  :: Utox +ve for cocaine this admission and when admitted last week, should only be elevated for 3-5 days after last use  :: No hx of withdrawal seizures or tremors  :: Last drink 2/1, low concern for withdrawal  :: Pt was starting to show signs of cocaine withdrawal (HTN, tachycardia) at end of last admission which prompted need to keep him admitted, however he left AMA because he stated his basement had flooded  Plan:  - Consider CIWA  - Discuss drug cessation resources     F: PRN for euvolemia  E: PRN K>4, Mg>2, P>3  N: regular diet  A: PIV     Oxygen: none  Abx: none  Gtts: none     DVT ppx: lovenox  GI ppx: protonix 40    Code Status: Full Code (confirmed on admission)  Surrogate Medical Decision-maker:  Julio César Celestin (relative) [phone]           Dexter Herring MD  Internal Medicine, PGY-1

## 2025-02-04 ENCOUNTER — APPOINTMENT (OUTPATIENT)
Dept: CARDIOLOGY | Facility: HOSPITAL | Age: 45
End: 2025-02-04
Payer: COMMERCIAL

## 2025-02-04 ENCOUNTER — APPOINTMENT (OUTPATIENT)
Dept: RADIOLOGY | Facility: HOSPITAL | Age: 45
End: 2025-02-04
Payer: COMMERCIAL

## 2025-02-04 PROBLEM — I95.9 HYPOTENSION: Status: ACTIVE | Noted: 2025-02-04

## 2025-02-04 LAB
ALBUMIN SERPL BCP-MCNC: 3.1 G/DL (ref 3.4–5)
ALBUMIN SERPL BCP-MCNC: 3.3 G/DL (ref 3.4–5)
ALBUMIN SERPL BCP-MCNC: 3.6 G/DL (ref 3.4–5)
ALBUMIN SERPL BCP-MCNC: 3.7 G/DL (ref 3.4–5)
ANION GAP BLDA CALCULATED.4IONS-SCNC: 12 MMO/L (ref 10–25)
ANION GAP BLDA CALCULATED.4IONS-SCNC: 17 MMO/L (ref 10–25)
ANION GAP BLDV CALCULATED.4IONS-SCNC: 13 MMOL/L (ref 10–25)
ANION GAP BLDV CALCULATED.4IONS-SCNC: 14 MMOL/L (ref 10–25)
ANION GAP BLDV CALCULATED.4IONS-SCNC: 15 MMOL/L (ref 10–25)
ANION GAP SERPL CALC-SCNC: 11 MMOL/L (ref 10–20)
ANION GAP SERPL CALC-SCNC: 11 MMOL/L (ref 10–20)
ANION GAP SERPL CALC-SCNC: 12 MMOL/L (ref 10–20)
ANION GAP SERPL CALC-SCNC: 18 MMOL/L (ref 10–20)
APTT PPP: 33 SECONDS (ref 27–38)
ATRIAL RATE: 60 BPM
ATRIAL RATE: 62 BPM
BASE EXCESS BLDA CALC-SCNC: -10.3 MMOL/L (ref -2–3)
BASE EXCESS BLDA CALC-SCNC: -8.5 MMOL/L (ref -2–3)
BASE EXCESS BLDV CALC-SCNC: -10 MMOL/L (ref -2–3)
BASE EXCESS BLDV CALC-SCNC: -7.5 MMOL/L (ref -2–3)
BASE EXCESS BLDV CALC-SCNC: -9.7 MMOL/L (ref -2–3)
BASOPHILS # BLD AUTO: 0.04 X10*3/UL (ref 0–0.1)
BASOPHILS # BLD AUTO: 0.05 X10*3/UL (ref 0–0.1)
BASOPHILS NFR BLD AUTO: 0.4 %
BASOPHILS NFR BLD AUTO: 0.5 %
BODY TEMPERATURE: 37 DEGREES CELSIUS
BODY TEMPERATURE: ABNORMAL
BUN SERPL-MCNC: 24 MG/DL (ref 6–23)
BUN SERPL-MCNC: 31 MG/DL (ref 6–23)
BUN SERPL-MCNC: 33 MG/DL (ref 6–23)
BUN SERPL-MCNC: 33 MG/DL (ref 6–23)
CA-I BLDA-SCNC: 1.2 MMOL/L (ref 1.1–1.33)
CA-I BLDA-SCNC: 1.22 MMOL/L (ref 1.1–1.33)
CA-I BLDV-SCNC: 1.06 MMOL/L (ref 1.1–1.33)
CA-I BLDV-SCNC: 1.15 MMOL/L (ref 1.1–1.33)
CA-I BLDV-SCNC: 1.31 MMOL/L (ref 1.1–1.33)
CALCIUM SERPL-MCNC: 8.1 MG/DL (ref 8.6–10.6)
CALCIUM SERPL-MCNC: 8.6 MG/DL (ref 8.6–10.6)
CALCIUM SERPL-MCNC: 8.9 MG/DL (ref 8.6–10.6)
CALCIUM SERPL-MCNC: 9.1 MG/DL (ref 8.6–10.6)
CARDIAC TROPONIN I PNL SERPL HS: 105 NG/L (ref 0–53)
CARDIAC TROPONIN I PNL SERPL HS: 111 NG/L (ref 0–53)
CARDIAC TROPONIN I PNL SERPL HS: 85 NG/L (ref 0–53)
CARDIAC TROPONIN I PNL SERPL HS: 94 NG/L (ref 0–53)
CHLORIDE BLDA-SCNC: 107 MMOL/L (ref 98–107)
CHLORIDE BLDA-SCNC: 110 MMOL/L (ref 98–107)
CHLORIDE BLDV-SCNC: 109 MMOL/L (ref 98–107)
CHLORIDE BLDV-SCNC: 109 MMOL/L (ref 98–107)
CHLORIDE BLDV-SCNC: 113 MMOL/L (ref 98–107)
CHLORIDE SERPL-SCNC: 105 MMOL/L (ref 98–107)
CHLORIDE SERPL-SCNC: 110 MMOL/L (ref 98–107)
CHLORIDE SERPL-SCNC: 113 MMOL/L (ref 98–107)
CHLORIDE SERPL-SCNC: 113 MMOL/L (ref 98–107)
CHLORIDE UR-SCNC: 114 MMOL/L
CHLORIDE/CREATININE (MMOL/G) IN URINE: 55 MMOL/G CREAT (ref 23–275)
CO2 SERPL-SCNC: 18 MMOL/L (ref 21–32)
CO2 SERPL-SCNC: 19 MMOL/L (ref 21–32)
CO2 SERPL-SCNC: 19 MMOL/L (ref 21–32)
CO2 SERPL-SCNC: 21 MMOL/L (ref 21–32)
CREAT SERPL-MCNC: 1.32 MG/DL (ref 0.5–1.3)
CREAT SERPL-MCNC: 1.44 MG/DL (ref 0.5–1.3)
CREAT SERPL-MCNC: 1.52 MG/DL (ref 0.5–1.3)
CREAT SERPL-MCNC: 1.57 MG/DL (ref 0.5–1.3)
CREAT UR-MCNC: 206.7 MG/DL (ref 20–370)
EGFRCR SERPLBLD CKD-EPI 2021: 55 ML/MIN/1.73M*2
EGFRCR SERPLBLD CKD-EPI 2021: 58 ML/MIN/1.73M*2
EGFRCR SERPLBLD CKD-EPI 2021: 61 ML/MIN/1.73M*2
EGFRCR SERPLBLD CKD-EPI 2021: 68 ML/MIN/1.73M*2
EOSINOPHIL # BLD AUTO: 0.17 X10*3/UL (ref 0–0.7)
EOSINOPHIL # BLD AUTO: 0.32 X10*3/UL (ref 0–0.7)
EOSINOPHIL NFR BLD AUTO: 1.8 %
EOSINOPHIL NFR BLD AUTO: 3.3 %
ERYTHROCYTE [DISTWIDTH] IN BLOOD BY AUTOMATED COUNT: 14 % (ref 11.5–14.5)
ERYTHROCYTE [DISTWIDTH] IN BLOOD BY AUTOMATED COUNT: 14.2 % (ref 11.5–14.5)
GLUCOSE BLD MANUAL STRIP-MCNC: 102 MG/DL (ref 74–99)
GLUCOSE BLD MANUAL STRIP-MCNC: 117 MG/DL (ref 74–99)
GLUCOSE BLD MANUAL STRIP-MCNC: 121 MG/DL (ref 74–99)
GLUCOSE BLD MANUAL STRIP-MCNC: 153 MG/DL (ref 74–99)
GLUCOSE BLD MANUAL STRIP-MCNC: 153 MG/DL (ref 74–99)
GLUCOSE BLD MANUAL STRIP-MCNC: 156 MG/DL (ref 74–99)
GLUCOSE BLD MANUAL STRIP-MCNC: 375 MG/DL (ref 74–99)
GLUCOSE BLD MANUAL STRIP-MCNC: 38 MG/DL (ref 74–99)
GLUCOSE BLD MANUAL STRIP-MCNC: 40 MG/DL (ref 74–99)
GLUCOSE BLD MANUAL STRIP-MCNC: 51 MG/DL (ref 74–99)
GLUCOSE BLD MANUAL STRIP-MCNC: 88 MG/DL (ref 74–99)
GLUCOSE BLD MANUAL STRIP-MCNC: 90 MG/DL (ref 74–99)
GLUCOSE BLD MANUAL STRIP-MCNC: 94 MG/DL (ref 74–99)
GLUCOSE BLDA-MCNC: 138 MG/DL (ref 74–99)
GLUCOSE BLDA-MCNC: 448 MG/DL (ref 74–99)
GLUCOSE BLDV-MCNC: 146 MG/DL (ref 74–99)
GLUCOSE BLDV-MCNC: 159 MG/DL (ref 74–99)
GLUCOSE BLDV-MCNC: 171 MG/DL (ref 74–99)
GLUCOSE SERPL-MCNC: 102 MG/DL (ref 74–99)
GLUCOSE SERPL-MCNC: 130 MG/DL (ref 74–99)
GLUCOSE SERPL-MCNC: 184 MG/DL (ref 74–99)
GLUCOSE SERPL-MCNC: 433 MG/DL (ref 74–99)
HCO3 BLDA-SCNC: 14.6 MMOL/L (ref 22–26)
HCO3 BLDA-SCNC: 17.3 MMOL/L (ref 22–26)
HCO3 BLDV-SCNC: 16.2 MMOL/L (ref 22–26)
HCO3 BLDV-SCNC: 16.6 MMOL/L (ref 22–26)
HCO3 BLDV-SCNC: 18.8 MMOL/L (ref 22–26)
HCT VFR BLD AUTO: 40.6 % (ref 41–52)
HCT VFR BLD AUTO: 44.3 % (ref 41–52)
HCT VFR BLD EST: 37 % (ref 41–52)
HCT VFR BLD EST: 40 % (ref 41–52)
HCT VFR BLD EST: 41 % (ref 41–52)
HGB BLD-MCNC: 13.6 G/DL (ref 13.5–17.5)
HGB BLD-MCNC: 14.2 G/DL (ref 13.5–17.5)
HGB BLDA-MCNC: 13.4 G/DL (ref 13.5–17.5)
HGB BLDA-MCNC: 13.5 G/DL (ref 13.5–17.5)
HGB BLDV-MCNC: 12.4 G/DL (ref 13.5–17.5)
HGB BLDV-MCNC: 13.7 G/DL (ref 13.5–17.5)
HGB BLDV-MCNC: 13.7 G/DL (ref 13.5–17.5)
IMM GRANULOCYTES # BLD AUTO: 0.03 X10*3/UL (ref 0–0.7)
IMM GRANULOCYTES # BLD AUTO: 0.05 X10*3/UL (ref 0–0.7)
IMM GRANULOCYTES NFR BLD AUTO: 0.3 % (ref 0–0.9)
IMM GRANULOCYTES NFR BLD AUTO: 0.5 % (ref 0–0.9)
INHALED O2 CONCENTRATION: 21 %
INHALED O2 CONCENTRATION: 21 %
INHALED O2 CONCENTRATION: 30 %
INHALED O2 CONCENTRATION: 30 %
INHALED O2 CONCENTRATION: 60 %
INR PPP: 1 (ref 0.9–1.1)
LACTATE BLDA-SCNC: 0.7 MMOL/L (ref 0.4–2)
LACTATE BLDA-SCNC: 0.9 MMOL/L (ref 0.4–2)
LACTATE BLDV-SCNC: 1.2 MMOL/L (ref 0.4–2)
LACTATE BLDV-SCNC: 1.2 MMOL/L (ref 0.4–2)
LACTATE BLDV-SCNC: 1.7 MMOL/L (ref 0.4–2)
LACTATE BLDV-SCNC: 4.2 MMOL/L (ref 0.4–2)
LACTATE SERPL-SCNC: 1.2 MMOL/L (ref 0.4–2)
LYMPHOCYTES # BLD AUTO: 1.28 X10*3/UL (ref 1.2–4.8)
LYMPHOCYTES # BLD AUTO: 1.36 X10*3/UL (ref 1.2–4.8)
LYMPHOCYTES NFR BLD AUTO: 13.4 %
LYMPHOCYTES NFR BLD AUTO: 14.1 %
MAGNESIUM SERPL-MCNC: 2.04 MG/DL (ref 1.6–2.4)
MAGNESIUM SERPL-MCNC: 2.1 MG/DL (ref 1.6–2.4)
MCH RBC QN AUTO: 26.1 PG (ref 26–34)
MCH RBC QN AUTO: 26.2 PG (ref 26–34)
MCHC RBC AUTO-ENTMCNC: 32.1 G/DL (ref 32–36)
MCHC RBC AUTO-ENTMCNC: 33.5 G/DL (ref 32–36)
MCV RBC AUTO: 78 FL (ref 80–100)
MCV RBC AUTO: 82 FL (ref 80–100)
MONOCYTES # BLD AUTO: 0.8 X10*3/UL (ref 0.1–1)
MONOCYTES # BLD AUTO: 1.12 X10*3/UL (ref 0.1–1)
MONOCYTES NFR BLD AUTO: 11.6 %
MONOCYTES NFR BLD AUTO: 8.4 %
NEUTROPHILS # BLD AUTO: 6.76 X10*3/UL (ref 1.2–7.7)
NEUTROPHILS # BLD AUTO: 7.19 X10*3/UL (ref 1.2–7.7)
NEUTROPHILS NFR BLD AUTO: 70.2 %
NEUTROPHILS NFR BLD AUTO: 75.5 %
NRBC BLD-RTO: 0 /100 WBCS (ref 0–0)
NRBC BLD-RTO: 0 /100 WBCS (ref 0–0)
OXYHGB MFR BLDA: 96.5 % (ref 94–98)
OXYHGB MFR BLDA: 97.3 % (ref 94–98)
OXYHGB MFR BLDV: 79.2 % (ref 45–75)
OXYHGB MFR BLDV: 79.8 % (ref 45–75)
OXYHGB MFR BLDV: 90.1 % (ref 45–75)
P AXIS: 60 DEGREES
P AXIS: 67 DEGREES
P OFFSET: 198 MS
P OFFSET: 201 MS
P ONSET: 140 MS
P ONSET: 143 MS
PCO2 BLDA: 29 MM HG (ref 38–42)
PCO2 BLDA: 36 MM HG (ref 38–42)
PCO2 BLDV: 36 MM HG (ref 41–51)
PCO2 BLDV: 37 MM HG (ref 41–51)
PCO2 BLDV: 40 MM HG (ref 41–51)
PH BLDA: 7.29 PH (ref 7.38–7.42)
PH BLDA: 7.31 PH (ref 7.38–7.42)
PH BLDV: 7.26 PH (ref 7.33–7.43)
PH BLDV: 7.26 PH (ref 7.33–7.43)
PH BLDV: 7.28 PH (ref 7.33–7.43)
PHOSPHATE SERPL-MCNC: 3.6 MG/DL (ref 2.5–4.9)
PHOSPHATE SERPL-MCNC: 3.6 MG/DL (ref 2.5–4.9)
PHOSPHATE SERPL-MCNC: 4.1 MG/DL (ref 2.5–4.9)
PHOSPHATE SERPL-MCNC: 4.9 MG/DL (ref 2.5–4.9)
PLATELET # BLD AUTO: 169 X10*3/UL (ref 150–450)
PLATELET # BLD AUTO: 171 X10*3/UL (ref 150–450)
PO2 BLDA: 104 MM HG (ref 85–95)
PO2 BLDA: 108 MM HG (ref 85–95)
PO2 BLDV: 50 MM HG (ref 35–45)
PO2 BLDV: 52 MM HG (ref 35–45)
PO2 BLDV: 65 MM HG (ref 35–45)
POTASSIUM BLDA-SCNC: 4 MMOL/L (ref 3.5–5.3)
POTASSIUM BLDA-SCNC: 4.4 MMOL/L (ref 3.5–5.3)
POTASSIUM BLDV-SCNC: 3.9 MMOL/L (ref 3.5–5.3)
POTASSIUM BLDV-SCNC: 4.6 MMOL/L (ref 3.5–5.3)
POTASSIUM BLDV-SCNC: 4.8 MMOL/L (ref 3.5–5.3)
POTASSIUM SERPL-SCNC: 4.1 MMOL/L (ref 3.5–5.3)
POTASSIUM SERPL-SCNC: 4.2 MMOL/L (ref 3.5–5.3)
POTASSIUM SERPL-SCNC: 4.3 MMOL/L (ref 3.5–5.3)
POTASSIUM SERPL-SCNC: 4.4 MMOL/L (ref 3.5–5.3)
POTASSIUM UR-SCNC: 71 MMOL/L
POTASSIUM/CREAT UR-RTO: 34 MMOL/G CREAT
PR INTERVAL: 156 MS
PR INTERVAL: 160 MS
PROTHROMBIN TIME: 11.5 SECONDS (ref 9.8–12.8)
Q ONSET: 220 MS
Q ONSET: 221 MS
QRS COUNT: 10 BEATS
QRS COUNT: 10 BEATS
QRS DURATION: 78 MS
QRS DURATION: 88 MS
QT INTERVAL: 440 MS
QT INTERVAL: 452 MS
QTC CALCULATION(BAZETT): 446 MS
QTC CALCULATION(BAZETT): 452 MS
QTC FREDERICIA: 445 MS
QTC FREDERICIA: 452 MS
R AXIS: 68 DEGREES
R AXIS: 79 DEGREES
RBC # BLD AUTO: 5.21 X10*6/UL (ref 4.5–5.9)
RBC # BLD AUTO: 5.43 X10*6/UL (ref 4.5–5.9)
SAO2 % BLDA: 98 % (ref 94–100)
SAO2 % BLDA: 99 % (ref 94–100)
SAO2 % BLDV: 81 % (ref 45–75)
SAO2 % BLDV: 81 % (ref 45–75)
SAO2 % BLDV: 92 % (ref 45–75)
SODIUM BLDA-SCNC: 132 MMOL/L (ref 136–145)
SODIUM BLDA-SCNC: 138 MMOL/L (ref 136–145)
SODIUM BLDV-SCNC: 136 MMOL/L (ref 136–145)
SODIUM BLDV-SCNC: 136 MMOL/L (ref 136–145)
SODIUM BLDV-SCNC: 139 MMOL/L (ref 136–145)
SODIUM SERPL-SCNC: 136 MMOL/L (ref 136–145)
SODIUM SERPL-SCNC: 138 MMOL/L (ref 136–145)
SODIUM SERPL-SCNC: 140 MMOL/L (ref 136–145)
SODIUM SERPL-SCNC: 140 MMOL/L (ref 136–145)
SODIUM UR-SCNC: 96 MMOL/L
SODIUM/CREAT UR-RTO: 46 MMOL/G CREAT
T AXIS: 26 DEGREES
T AXIS: 50 DEGREES
T OFFSET: 441 MS
T OFFSET: 446 MS
VENTRICULAR RATE: 60 BPM
VENTRICULAR RATE: 62 BPM
WBC # BLD AUTO: 9.5 X10*3/UL (ref 4.4–11.3)
WBC # BLD AUTO: 9.6 X10*3/UL (ref 4.4–11.3)

## 2025-02-04 PROCEDURE — 2500000004 HC RX 250 GENERAL PHARMACY W/ HCPCS (ALT 636 FOR OP/ED)

## 2025-02-04 PROCEDURE — 84132 ASSAY OF SERUM POTASSIUM: CPT

## 2025-02-04 PROCEDURE — 93010 ELECTROCARDIOGRAM REPORT: CPT | Performed by: INTERNAL MEDICINE

## 2025-02-04 PROCEDURE — 2500000001 HC RX 250 WO HCPCS SELF ADMINISTERED DRUGS (ALT 637 FOR MEDICARE OP)

## 2025-02-04 PROCEDURE — 85025 COMPLETE CBC W/AUTO DIFF WBC: CPT

## 2025-02-04 PROCEDURE — 99223 1ST HOSP IP/OBS HIGH 75: CPT | Performed by: STUDENT IN AN ORGANIZED HEALTH CARE EDUCATION/TRAINING PROGRAM

## 2025-02-04 PROCEDURE — 36415 COLL VENOUS BLD VENIPUNCTURE: CPT

## 2025-02-04 PROCEDURE — 83735 ASSAY OF MAGNESIUM: CPT

## 2025-02-04 PROCEDURE — 85730 THROMBOPLASTIN TIME PARTIAL: CPT

## 2025-02-04 PROCEDURE — 71045 X-RAY EXAM CHEST 1 VIEW: CPT | Performed by: RADIOLOGY

## 2025-02-04 PROCEDURE — 87040 BLOOD CULTURE FOR BACTERIA: CPT

## 2025-02-04 PROCEDURE — 83605 ASSAY OF LACTIC ACID: CPT

## 2025-02-04 PROCEDURE — 82947 ASSAY GLUCOSE BLOOD QUANT: CPT

## 2025-02-04 PROCEDURE — 2500000005 HC RX 250 GENERAL PHARMACY W/O HCPCS

## 2025-02-04 PROCEDURE — 84484 ASSAY OF TROPONIN QUANT: CPT

## 2025-02-04 PROCEDURE — 2020000001 HC ICU ROOM DAILY

## 2025-02-04 PROCEDURE — 71045 X-RAY EXAM CHEST 1 VIEW: CPT

## 2025-02-04 PROCEDURE — 82436 ASSAY OF URINE CHLORIDE: CPT

## 2025-02-04 PROCEDURE — 94799 UNLISTED PULMONARY SVC/PX: CPT

## 2025-02-04 PROCEDURE — 82435 ASSAY OF BLOOD CHLORIDE: CPT

## 2025-02-04 PROCEDURE — 37799 UNLISTED PX VASCULAR SURGERY: CPT

## 2025-02-04 PROCEDURE — 80069 RENAL FUNCTION PANEL: CPT

## 2025-02-04 PROCEDURE — 82330 ASSAY OF CALCIUM: CPT

## 2025-02-04 PROCEDURE — 85610 PROTHROMBIN TIME: CPT

## 2025-02-04 PROCEDURE — 82810 BLOOD GASES O2 SAT ONLY: CPT

## 2025-02-04 PROCEDURE — 93005 ELECTROCARDIOGRAM TRACING: CPT

## 2025-02-04 PROCEDURE — 2500000004 HC RX 250 GENERAL PHARMACY W/ HCPCS (ALT 636 FOR OP/ED): Mod: JZ

## 2025-02-04 RX ORDER — CALCIUM GLUCONATE 20 MG/ML
INJECTION, SOLUTION INTRAVENOUS
Status: DISPENSED
Start: 2025-02-04 | End: 2025-02-04

## 2025-02-04 RX ORDER — CARVEDILOL 25 MG/1
25 TABLET ORAL 2 TIMES DAILY
Status: DISCONTINUED | OUTPATIENT
Start: 2025-02-04 | End: 2025-02-04

## 2025-02-04 RX ORDER — LOSARTAN POTASSIUM 50 MG/1
50 TABLET ORAL DAILY
Status: CANCELLED | OUTPATIENT
Start: 2025-02-05

## 2025-02-04 RX ORDER — CARVEDILOL 25 MG/1
25 TABLET ORAL 2 TIMES DAILY
Status: CANCELLED | OUTPATIENT
Start: 2025-02-04

## 2025-02-04 RX ORDER — SODIUM CHLORIDE, SODIUM LACTATE, POTASSIUM CHLORIDE, CALCIUM CHLORIDE 600; 310; 30; 20 MG/100ML; MG/100ML; MG/100ML; MG/100ML
INJECTION, SOLUTION INTRAVENOUS CONTINUOUS
Status: CANCELLED | OUTPATIENT
Start: 2025-02-04 | End: 2025-02-05

## 2025-02-04 RX ORDER — CALCIUM GLUCONATE 20 MG/ML
2 INJECTION, SOLUTION INTRAVENOUS ONCE
Status: COMPLETED | OUTPATIENT
Start: 2025-02-04 | End: 2025-02-04

## 2025-02-04 RX ORDER — CALCIUM GLUCONATE 20 MG/ML
1 INJECTION, SOLUTION INTRAVENOUS ONCE
Status: DISCONTINUED | OUTPATIENT
Start: 2025-02-04 | End: 2025-02-04

## 2025-02-04 RX ORDER — DEXTROSE MONOHYDRATE 50 MG/ML
50 INJECTION, SOLUTION INTRAVENOUS CONTINUOUS
Status: DISCONTINUED | OUTPATIENT
Start: 2025-02-04 | End: 2025-02-05

## 2025-02-04 RX ORDER — CALCIUM CHLORIDE INJECTION 100 MG/ML
INJECTION, SOLUTION INTRAVENOUS
Status: DISPENSED
Start: 2025-02-04 | End: 2025-02-04

## 2025-02-04 RX ORDER — LORAZEPAM 0.5 MG/1
0.5 TABLET ORAL EVERY 2 HOUR PRN
Status: CANCELLED | OUTPATIENT
Start: 2025-02-04

## 2025-02-04 RX ORDER — CALCIUM GLUCONATE 20 MG/ML
INJECTION, SOLUTION INTRAVENOUS
Status: COMPLETED
Start: 2025-02-04 | End: 2025-02-04

## 2025-02-04 RX ORDER — LORAZEPAM 1 MG/1
2 TABLET ORAL EVERY 2 HOUR PRN
Status: CANCELLED | OUTPATIENT
Start: 2025-02-04

## 2025-02-04 RX ORDER — CALCIUM GLUCONATE 20 MG/ML
2 INJECTION, SOLUTION INTRAVENOUS ONCE
Status: DISCONTINUED | OUTPATIENT
Start: 2025-02-04 | End: 2025-02-04

## 2025-02-04 RX ORDER — LORAZEPAM 1 MG/1
1 TABLET ORAL EVERY 2 HOUR PRN
Status: CANCELLED | OUTPATIENT
Start: 2025-02-04

## 2025-02-04 RX ADMIN — PANTOPRAZOLE SODIUM 40 MG: 40 TABLET, DELAYED RELEASE ORAL at 07:12

## 2025-02-04 RX ADMIN — FOLIC ACID 1 MG: 1 TABLET ORAL at 08:22

## 2025-02-04 RX ADMIN — COLCHICINE 0.6 MG: 0.6 TABLET ORAL at 08:22

## 2025-02-04 RX ADMIN — LOSARTAN POTASSIUM 50 MG: 50 TABLET, FILM COATED ORAL at 08:22

## 2025-02-04 RX ADMIN — SODIUM CHLORIDE, POTASSIUM CHLORIDE, SODIUM LACTATE AND CALCIUM CHLORIDE 500 ML: 600; 310; 30; 20 INJECTION, SOLUTION INTRAVENOUS at 23:34

## 2025-02-04 RX ADMIN — OXYCODONE 5 MG: 5 TABLET ORAL at 00:52

## 2025-02-04 RX ADMIN — GLUCAGON 2 MG/HR: 1 INJECTION, POWDER, LYOPHILIZED, FOR SOLUTION INTRAMUSCULAR; INTRAVENOUS at 12:49

## 2025-02-04 RX ADMIN — CARVEDILOL 25 MG: 25 TABLET, FILM COATED ORAL at 08:22

## 2025-02-04 RX ADMIN — DEXTROSE MONOHYDRATE 50 ML/HR: 50 INJECTION, SOLUTION INTRAVENOUS at 18:24

## 2025-02-04 RX ADMIN — ENOXAPARIN SODIUM 40 MG: 100 INJECTION SUBCUTANEOUS at 21:08

## 2025-02-04 RX ADMIN — SODIUM CHLORIDE, POTASSIUM CHLORIDE, SODIUM LACTATE AND CALCIUM CHLORIDE 1000 ML: 600; 310; 30; 20 INJECTION, SOLUTION INTRAVENOUS at 09:27

## 2025-02-04 RX ADMIN — OXYCODONE 5 MG: 5 TABLET ORAL at 08:22

## 2025-02-04 RX ADMIN — CALCIUM GLUCONATE 2 G: 20 INJECTION, SOLUTION INTRAVENOUS at 10:44

## 2025-02-04 RX ADMIN — ASPIRIN 81 MG CHEWABLE TABLET 81 MG: 81 TABLET CHEWABLE at 08:22

## 2025-02-04 RX ADMIN — ATORVASTATIN CALCIUM 80 MG: 80 TABLET, FILM COATED ORAL at 21:08

## 2025-02-04 RX ADMIN — DEXTROSE MONOHYDRATE 25 G: 25 INJECTION, SOLUTION INTRAVENOUS at 16:10

## 2025-02-04 RX ADMIN — THIAMINE HCL TAB 100 MG 100 MG: 100 TAB at 08:22

## 2025-02-04 RX ADMIN — METOPROLOL TARTRATE 100 MG: 50 TABLET, FILM COATED ORAL at 08:22

## 2025-02-04 ASSESSMENT — LIFESTYLE VARIABLES
NAUSEA AND VOMITING: NO NAUSEA AND NO VOMITING
TREMOR: NO TREMOR
NAUSEA AND VOMITING: NO NAUSEA AND NO VOMITING
HEADACHE, FULLNESS IN HEAD: NOT PRESENT
VISUAL DISTURBANCES: NOT PRESENT
NAUSEA AND VOMITING: NO NAUSEA AND NO VOMITING
ANXIETY: NO ANXIETY, AT EASE
BLOOD PRESSURE: 114/76
AGITATION: NORMAL ACTIVITY
ANXIETY: NO ANXIETY, AT EASE
TREMOR: NO TREMOR
AGITATION: NORMAL ACTIVITY
VISUAL DISTURBANCES: NOT PRESENT
TREMOR: NO TREMOR
TOTAL SCORE: 0
HEADACHE, FULLNESS IN HEAD: NOT PRESENT
AUDITORY DISTURBANCES: NOT PRESENT
PAROXYSMAL SWEATS: NO SWEAT VISIBLE
ORIENTATION AND CLOUDING OF SENSORIUM: ORIENTED AND CAN DO SERIAL ADDITIONS
PAROXYSMAL SWEATS: NO SWEAT VISIBLE
AUDITORY DISTURBANCES: NOT PRESENT
VISUAL DISTURBANCES: NOT PRESENT
ANXIETY: NO ANXIETY, AT EASE
TOTAL SCORE: 0
HEADACHE, FULLNESS IN HEAD: NOT PRESENT
TOTAL SCORE: 0
ORIENTATION AND CLOUDING OF SENSORIUM: ORIENTED AND CAN DO SERIAL ADDITIONS
PAROXYSMAL SWEATS: NO SWEAT VISIBLE
AGITATION: NORMAL ACTIVITY
AUDITORY DISTURBANCES: NOT PRESENT
ORIENTATION AND CLOUDING OF SENSORIUM: ORIENTED AND CAN DO SERIAL ADDITIONS

## 2025-02-04 ASSESSMENT — PAIN - FUNCTIONAL ASSESSMENT
PAIN_FUNCTIONAL_ASSESSMENT: 0-10

## 2025-02-04 ASSESSMENT — PAIN SCALES - GENERAL
PAINLEVEL_OUTOF10: 7
PAINLEVEL_OUTOF10: 10 - WORST POSSIBLE PAIN
PAINLEVEL_OUTOF10: 0 - NO PAIN

## 2025-02-04 ASSESSMENT — PAIN DESCRIPTION - DESCRIPTORS: DESCRIPTORS: SHARP;SHOOTING

## 2025-02-04 NOTE — SIGNIFICANT EVENT
Rapid Response Nurse Note: Rapid Response/RADAR Score = 6    Pager time: [phone]  Arrival time: 930  Event end time:   Location: [place] [address]  [] Triage by phone or secure messaging    Rapid response initiated by:  [] Rapid response RN [] Family [] Nursing Supervisor [] Physician   [] RADAR auto page [] Sepsis auto-page [x] RN [] RT   [] NP/PA [] Other:     Primary reason for call:   [] BAT [] New CPAP/BiPAP [] Bleeding [x] Change in mental status   [x] Chest pain [] Code blue [] FiO2 >/= 50% [] HR </= 40 bpm   [] HR >/= 130 bpm [] Hyperglycemia [] Hypoglycemia [] RADAR    [] RR </= 8 bpm [] RR >/= 30 bpm [] SBP </= 90 mmHg [] SpO2 < 90%   [] Seizure [] Sepsis [x] Shortness of breath  [x] Staff concern: see comments     Initial VS and/or RADAR VS: HR 62; RR 22; BP 99/72; SPO2 91%.    Providers present at bedside (if applicable): Maicol (primary)    Name of ICU Provider contacted (if applicable): CICU fellow per primary team phone call    Interventions:  [] None [x] ABG/VBG [x] Assist w/ICU transfer [] BAT paged    [] Bag mask [] Blood [] Cardioversion [] Code Blue   [] Code blue for intubation [] Code status changed [] Chest x-ray [x] EKG   [x] IV fluid/bolus [] KUB x-ray [x] Labs/cultures [] Medication   [] Nebulizer treatment [] NIPPV (CPAP/BiPAP) [x] Oxygen [] Oral airway   [] Peripheral IV [] Palliative care consult [] CT/MRI [] Sepsis protocol    [] Suctioned [] Other:     Outcome:  [] Coded and  [] Code blue for intubation [] Coded and transferred to ICU []  on division   [] Remained on division (no change) [] Remained on division + additional monitoring [] Remained in ED [] Transferred to ED   [x] Transferred to ICU [] Transferred to inpatient status [] Transferred for interventions (procedure) [] Transferred to ICU stepdown    [] Transferred to surgery [] Transferred to telemetry [] Sepsis protocol [] STEMI protocol   [] Stroke protocol [] Bedside nurse instructed to page rapid response for any  concerns or acute change in condition/VS     Additional Comments:   Rapid Response paged for chest pain and symptomatic hypotension relative to baseline.    On my arrival, pt lying flat in bed. HR 62 Sinus rhythm, BP 99/72, pulse ox 91% 5LNC. Pt is lethargic but responds quickly to verbal cues. Diaphoretic with beads of sweat on head; cool extremities. Denies chest pain on my arrival; 12 lead EKG completed.  IV Lactated ringers 1 liter initiated wide open on pressure bag. Full panel of labs sent including VBG. VBG result - ph 7.26/pCO2 37/pO2 50/SLZ975.6/lactate 4.2. Per primary team, pt received 100mg PO Metoprolol in prep for cardiac CTA today in addition to 25mg PO Carvedilol and 50mg PO losartan. Pt's baseline is hypertensive- 's-180.     Despite fluid bolus, BP remained 90's/60's and pt with continued symptoms. CICU fellow contacted by primary team MD Milian.  Pt accepted to CICU and transferred uneventfully at 10:20 with rapid response team and resp therapy.  RN report called by Ryley Luke RN.

## 2025-02-04 NOTE — HOSPITAL COURSE
Theron Moran is a 44 y.o. male who presented on 2/2/2025 and had a 3-day complicated hospitalization for Resistant hypertension.     He has a PMHx of polysubstance use disorder, subarachnoid hemorrhage, mood disorder, HTN, diabetes, gout, SI and HI, and hypertensive emergency with flash pulmonary edema who presented to the ED on 2/2/25 for chest pain, bilateral foot pain, and hypertension.    In the ED, patient's BP was 242/185 on presentation. He received 10mg labetalol, losartan 25, and coreg 6.25mg with improvement in his BP to 169/121. He also received ASA 324mg and was started on heparin gtt. Colchicine 0.6mg, Meloxicam and IV morphine for pain which provided some relief. Labs were notable for WBC 16, troponin 248 -> 234 -> 207, uric acid 8 (H), and utox positive for cocaine and opiates (+ opiates likely from morphine). EKG with no ST changes. CTA chest with no aortic pathology and some GGOs which could not exclude atypical infectious process. Patient was admitted to general cardiology for further management of hypertensive urgency.    Cardiac MRI was obtained 2/7 which showed severely reduced EF 22%. GDMT was initiated. Patient  then later 169. Plan was to increase meds for BP control and GDMT with possible DC 2/8.     2/7 2 PM Received notification from Rheumatology resident that when she went to see pt that he was not there. Nurse informed us his belongings were gone and she had last seen him at 1:59. Attempted to call pt (at all listed numbers) with no response. Attending Dr. Grissom notified. It appears patient eloped without notifying any staff member, left with IV in his arm and belongings but no medications. Did not return within 4 hours of elopement therefore discharged AMA.

## 2025-02-04 NOTE — SIGNIFICANT EVENT
"At 0907 bedside RN informed [facility] primary cardiology team that patient was complainig of SOB and was diaphoretic. /75, HR 68, and SpO2 97% on RA. Patient received coreg 25mg and losartan 50mg this morning as part of hypertensive urgency management, then he received metoprolol tartrate 100mg at 0822 as part of CTA coronary study prep which required HR <60. [facility] primary team arrived at bedside to evaluate patient. Repeat vitals showed /73, HR 64, SpO2 98% placed on 2L for comfort. Glucose checked with . Patient continued to note feeling \"weird\" and was diaphoretic. Extremities slightly cold on physical exam.  EKG obtained with normal sinus rhythm. Patient reported that his chest pain had resolved earlier this morning and was only experiencing gout pain at the time. Rapid response was called. 1L LR bolus was administered. VBG showed pH 7.26, pCO2 37, pO2 50, lactate 4.2. Patient's BP continued to drop with SBP in 90s despite fluids. Patient remained diaphoretic but continued to deny chest pain. His BP still did not improve and remained consistent with SBP in the 80s on repeated checks. CICU fellow was contacted and patient was accepted for transfer for further BP support and monitoring.  "
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
## 2025-02-04 NOTE — CONSULTS
"Consults    History Of Present Illness  Theron Moran is a 44 y.o. male with PMHx traumatic SAH, polysubstance use (cocaine), HTN, DM2, gout, mood disorder presenting with chest pain and uncontrolled HTN. BAT called for confusion and left sided weakness.    Last known well: 2/4 1220, however patient reported that symptoms had been present for 3 weeks prior to 1/17  Had stroke symptoms resolved at time of presentation: No    BAT called for confusion and left sided weakness; LKW initially reported as 1220. NIH 7 for mild L FD, LUE and LLE AG, moderate dysarthria, left sided sensory loss. /91, . While examining patient, he mentioned that he had a stroke recently. On further interview, patient reported that he has had the above deficits for several weeks: he initially presented to [facility] on 1/17 for 3 weeks of slurred speech and LUE/LLE weakness, which he was reportedly told was a stroke. He reported that symptoms have persisted since onset, but [facility] documentation only noted dysarthria and L FD, and impression was focal symptoms 2/2 hypertensive emergency. Furthermore, primary team exam from day prior was negative for \"focal neurological deficits.\" Patient denied any new weakness or dysarthria compared to prior to admission; he was ambulating using his upper extremities at home. A1c 6.4, ; TTE showed LVEF 47%, severe LA dilation (no bubble study obtained). Although patient reported that he last used cocaine 8 weeks ago, his UDS on admission was positive for cocaine.    PRIOR IMAGING ([facility], no images)  MRI 1/18/2025  Scattered punctate foci of increased T2 and FLAIR signal in the supratentorial white matter. Multiple remote lacunar infarcts, primarily in the basal ganglia, right corona radiata, as well as along the corpus callosum and right centrum semiovale. Foci of encephalomalacia and gliosis in the inferior frontal lobes and lateral left temporal lobe most likely related to prior trauma based on " location.     CTA H/N 1/17/2025  No evidence of a proximal/large artery occlusion or high grade stenosis, acute dissection, or sizable/large aneurysm.  Atherosclerotic changes and related stenoses, and also scattered nonspecific arterial stenoses elsewhere as above, including moderate-severe in the mid left-sided MCA M1 segment.     CT 12/30/2024  Radiology report negative for acute changes. However, per Stroke Neurology (Dr. Mitchell), R BG infarct new from prior. Suspected small vessel disease due to hypertension    BRIEF HISTORY  On 2/2, patient presented with midsternal chest pain. He reported numbness in his left arm but denied any radiation of his pain.  Additionally endorses some lightheadedness, occasional blurry vision, and vomiting. He endorsed medication compliance since discharge. /185 on presentation, for which he received labetalol 10mg, losartan 25mg, and Coreg 6.25mg; BP improved to 169/121. He also received ASA 324mg and was started on heparin gtt. Labs were notable for WBC 16, troponin 248 (has since downtrended). EKG without ST changes.     On 2/4, patient reported dyspnea and diaphoresis. /75, HR 68, and SpO2 97% on RA. Earlier in the day, patient received Coreg 25mg and losartan 50mg as part of hypertensive urgency management, then he received metoprolol tartrate 100mg in preparation for CTA coronary study. Extremities slightly cold on physical exam. EKG obtained with normal sinus rhythm. Rapid response was called. 1L LR bolus was administered. VBG showed pH 7.26, pCO2 37, pO2 50, lactate 4.2. Patient's BP continued to drop with SBP in 90s despite fluids. Transferred to CICU for further management.    Patient recently admitted on 1/26 for 10/10 chest pain and HTN (196/143, max SBP 220s). EKG without ischemic changes. BP was controlled with losartan and Coreg. He was placed on CIWA (c/f cocaine withdrawal). Patient left AMA on 1/28.    Past Medical History  History reviewed. No  "pertinent past medical history.  Surgical History  History reviewed. No pertinent surgical history.  Social History  Social History     Tobacco Use    Smoking status: Former     Types: Cigarettes    Smokeless tobacco: Never   Substance Use Topics    Alcohol use: Yes     Alcohol/week: 12.0 standard drinks of alcohol     Types: 12 Cans of beer per week    Drug use: Yes     Types: Cocaine     Comment: occasional     Allergies  Patient has no known allergies.  Home Medications  Medications Prior to Admission   Medication Sig Dispense Refill Last Dose/Taking    albuterol 90 mcg/actuation inhaler Inhale 2 puffs every 4 hours if needed.   Past Week    aspirin 81 mg chewable tablet Chew 1 tablet (81 mg) once daily. 30 tablet 0 2/1/2025 Bedtime    atorvastatin (Lipitor) 80 mg tablet Take 1 tablet (80 mg) by mouth once daily at bedtime. 30 tablet 0 2/1/2025 Bedtime    carvedilol (Coreg) 6.25 mg tablet Take 1 tablet (6.25 mg) by mouth 2 times a day. 60 tablet 0 2/1/2025 Bedtime    colchicine 0.6 mg tablet Take 1 tablet (0.6 mg) by mouth once daily. 30 tablet 0 2/1/2025 Bedtime    folic acid (Folvite) 1 mg tablet Take 1 tablet (1 mg) by mouth once daily. 30 tablet 0 2/1/2025 Bedtime    losartan (Cozaar) 25 mg tablet Take 1 tablet (25 mg) by mouth once daily. 30 tablet 0 2/1/2025 Bedtime    multivitamin with minerals tablet Take 1 tablet by mouth once daily. 30 tablet 0 2/1/2025 Bedtime    nitroglycerin (Nitrostat) 0.4 mg SL tablet Place 1 tablet (0.4 mg) under the tongue every 5 minutes if needed for chest pain. 75 tablet 0 2/1/2025 Bedtime    thiamine (Vitamin B-1) 100 mg tablet Take 1 tablet (100 mg) by mouth once daily. Do not start before January 30, 2025. 30 tablet 0 2/1/2025 Bedtime     Last Recorded Vitals  Blood pressure 101/81, pulse 61, temperature 36 °C (96.8 °F), resp. rate (!) 27, height 1.676 m (5' 6\"), weight 91.6 kg (201 lb 15.1 oz), SpO2 98%.    Neurological Exam:  MENTAL STATUS:  General appearance: alert, " NAD  Orientation: person, place, month  Language: Expression, repetition, naming, comprehension intact  Follows complex commands across midline    CRANIAL NERVES:  - II:  Visual fields intact to confrontation bilaterally  - III, IV, VI: RENETTA, EOMI to pursuit without nystagmus  - V: diminished on L face  - VII: mild L FD  - VIII: Intact to voice  - IX, X: moderate dysarthria  - XI: 5/5 R shoulder shrug, 0/5 L shoulder shrug  - XII: Tongue midline without atrophy or fasciculation    MOTOR: Tone and bulk normal in all extremities    STRENGTH: R L  Deltoid  5 2  Biceps  5 3  Triceps  5 3    5 4    Hip flexion 5 4  Quadriceps 5 5  Hamstrings 5 4    REFLEXES:  R  L  Biceps   2+ 2+  Triceps   2+ 2+  Patellar   2+ 2+    COORDINATION: Intact on finger to nose bl    SENSORY: diminished LT on LUE and LLE    GAIT: deferred    NIH Stroke Scale  1a  Level of consciousness: 0=alert; keenly responsive   1b. LOC questions:  0=Performs both tasks correctly   1c. LOC commands: 0=Performs both tasks correctly   2.  Best Gaze: 0=normal   3.  Visual: 0=No visual loss   4. Facial Palsy: 1=Minor paralysis (flattened nasolabial fold, asymmetric on smiling)   5a.  Motor left arm: 2=Some effort against gravity, limb cannot get to or maintain (if cured) 90 (or 45) degrees, drifts down to bed, but has some effort against gravity   5b.  Motor right arm: 0=No drift, limb holds 90 (or 45) degrees for full 10 seconds   6a. motor left le=Some effort against gravity, limb cannot get to or maintain (if cured) 90 (or 45) degrees, drifts down to bed, but has some effort against gravity   6b  Motor right le=No drift, limb holds 90 (or 45) degrees for full 10 seconds   7. Limb Ataxia: 0=Absent   8.  Sensory: 1=Mild to moderate sensory loss; patient feels pinprick is less sharp or is dull on the affected side; there is a loss of superficial pain with pinprick but patient is aware He is being touched   9. Best Language:  0=No aphasia, normal    10. Dysarthria: 1=Mild to moderate, patient slurs at least some words and at worst, can be understood with some difficulty   11. Extinction and Inattention: 0=No abnormality    Total:   7       Relevant Results  Results for orders placed or performed during the hospital encounter of 02/02/25 (from the past 24 hours)   POCT GLUCOSE   Result Value Ref Range    POCT Glucose 143 (H) 74 - 99 mg/dL   CBC and Auto Differential   Result Value Ref Range    WBC 10.3 4.4 - 11.3 x10*3/uL    nRBC 0.0 0.0 - 0.0 /100 WBCs    RBC 5.30 4.50 - 5.90 x10*6/uL    Hemoglobin 13.8 13.5 - 17.5 g/dL    Hematocrit 42.9 41.0 - 52.0 %    MCV 81 80 - 100 fL    MCH 26.0 26.0 - 34.0 pg    MCHC 32.2 32.0 - 36.0 g/dL    RDW 13.8 11.5 - 14.5 %    Platelets 191 150 - 450 x10*3/uL    Neutrophils % 68.6 40.0 - 80.0 %    Immature Granulocytes %, Automated 0.5 0.0 - 0.9 %    Lymphocytes % 16.5 13.0 - 44.0 %    Monocytes % 10.0 2.0 - 10.0 %    Eosinophils % 3.9 0.0 - 6.0 %    Basophils % 0.5 0.0 - 2.0 %    Neutrophils Absolute 7.03 1.20 - 7.70 x10*3/uL    Immature Granulocytes Absolute, Automated 0.05 0.00 - 0.70 x10*3/uL    Lymphocytes Absolute 1.69 1.20 - 4.80 x10*3/uL    Monocytes Absolute 1.03 (H) 0.10 - 1.00 x10*3/uL    Eosinophils Absolute 0.40 0.00 - 0.70 x10*3/uL    Basophils Absolute 0.05 0.00 - 0.10 x10*3/uL   Renal function panel   Result Value Ref Range    Glucose 127 (H) 74 - 99 mg/dL    Sodium 144 136 - 145 mmol/L    Potassium 3.6 3.5 - 5.3 mmol/L    Chloride 112 (H) 98 - 107 mmol/L    Bicarbonate 23 21 - 32 mmol/L    Anion Gap 13 10 - 20 mmol/L    Urea Nitrogen 27 (H) 6 - 23 mg/dL    Creatinine 1.44 (H) 0.50 - 1.30 mg/dL    eGFR 61 >60 mL/min/1.73m*2    Calcium 8.7 8.6 - 10.6 mg/dL    Phosphorus 3.8 2.5 - 4.9 mg/dL    Albumin 3.4 3.4 - 5.0 g/dL   Magnesium   Result Value Ref Range    Magnesium 1.78 1.60 - 2.40 mg/dL   POCT GLUCOSE   Result Value Ref Range    POCT Glucose 153 (H) 74 - 99 mg/dL   POCT GLUCOSE   Result Value Ref Range     POCT Glucose 117 (H) 74 - 99 mg/dL   CBC and Auto Differential   Result Value Ref Range    WBC 9.6 4.4 - 11.3 x10*3/uL    nRBC 0.0 0.0 - 0.0 /100 WBCs    RBC 5.43 4.50 - 5.90 x10*6/uL    Hemoglobin 14.2 13.5 - 17.5 g/dL    Hematocrit 44.3 41.0 - 52.0 %    MCV 82 80 - 100 fL    MCH 26.2 26.0 - 34.0 pg    MCHC 32.1 32.0 - 36.0 g/dL    RDW 14.2 11.5 - 14.5 %    Platelets 171 150 - 450 x10*3/uL    Neutrophils % 70.2 40.0 - 80.0 %    Immature Granulocytes %, Automated 0.3 0.0 - 0.9 %    Lymphocytes % 14.1 13.0 - 44.0 %    Monocytes % 11.6 2.0 - 10.0 %    Eosinophils % 3.3 0.0 - 6.0 %    Basophils % 0.5 0.0 - 2.0 %    Neutrophils Absolute 6.76 1.20 - 7.70 x10*3/uL    Immature Granulocytes Absolute, Automated 0.03 0.00 - 0.70 x10*3/uL    Lymphocytes Absolute 1.36 1.20 - 4.80 x10*3/uL    Monocytes Absolute 1.12 (H) 0.10 - 1.00 x10*3/uL    Eosinophils Absolute 0.32 0.00 - 0.70 x10*3/uL    Basophils Absolute 0.05 0.00 - 0.10 x10*3/uL   Renal function panel   Result Value Ref Range    Glucose 130 (H) 74 - 99 mg/dL    Sodium 140 136 - 145 mmol/L    Potassium 4.2 3.5 - 5.3 mmol/L    Chloride 113 (H) 98 - 107 mmol/L    Bicarbonate 19 (L) 21 - 32 mmol/L    Anion Gap 12 10 - 20 mmol/L    Urea Nitrogen 31 (H) 6 - 23 mg/dL    Creatinine 1.44 (H) 0.50 - 1.30 mg/dL    eGFR 61 >60 mL/min/1.73m*2    Calcium 8.6 8.6 - 10.6 mg/dL    Phosphorus 4.1 2.5 - 4.9 mg/dL    Albumin 3.6 3.4 - 5.0 g/dL   Magnesium   Result Value Ref Range    Magnesium 2.10 1.60 - 2.40 mg/dL   POCT GLUCOSE   Result Value Ref Range    POCT Glucose 153 (H) 74 - 99 mg/dL   Blood Gas Venous Full Panel   Result Value Ref Range    POCT pH, Venous 7.26 (L) 7.33 - 7.43 pH    POCT pCO2, Venous 37 (L) 41 - 51 mm Hg    POCT pO2, Venous 50 (H) 35 - 45 mm Hg    POCT SO2, Venous 81 (H) 45 - 75 %    POCT Oxy Hemoglobin, Venous 79.8 (H) 45.0 - 75.0 %    POCT Hematocrit Calculated, Venous 41.0 41.0 - 52.0 %    POCT Sodium, Venous 136 136 - 145 mmol/L    POCT Potassium, Venous 4.8 3.5  - 5.3 mmol/L    POCT Chloride, Venous 109 (H) 98 - 107 mmol/L    POCT Ionized Calicum, Venous 1.15 1.10 - 1.33 mmol/L    POCT Glucose, Venous 146 (H) 74 - 99 mg/dL    POCT Lactate, Venous 4.2 (HH) 0.4 - 2.0 mmol/L    POCT Base Excess, Venous -9.7 (L) -2.0 - 3.0 mmol/L    POCT HCO3 Calculated, Venous 16.6 (L) 22.0 - 26.0 mmol/L    POCT Hemoglobin, Venous 13.7 13.5 - 17.5 g/dL    POCT Anion Gap, Venous 15.0 10.0 - 25.0 mmol/L    Patient Temperature      FiO2 21 %   Troponin I, High Sensitivity, Initial   Result Value Ref Range    Troponin I, High Sensitivity (CMC) 111 (H) 0 - 53 ng/L   ECG 12 Lead   Result Value Ref Range    Ventricular Rate 62 BPM    Atrial Rate 62 BPM    RI Interval 156 ms    QRS Duration 88 ms    QT Interval 440 ms    QTC Calculation(Bazett) 446 ms    P Axis 67 degrees    R Axis 68 degrees    T Axis 50 degrees    QRS Count 10 beats    Q Onset 221 ms    P Onset 143 ms    P Offset 201 ms    T Offset 441 ms    QTC Fredericia 445 ms   Blood Gas Venous Full Panel   Result Value Ref Range    POCT pH, Venous 7.26 (L) 7.33 - 7.43 pH    POCT pCO2, Venous 36 (L) 41 - 51 mm Hg    POCT pO2, Venous 52 (H) 35 - 45 mm Hg    POCT SO2, Venous 81 (H) 45 - 75 %    POCT Oxy Hemoglobin, Venous 79.2 (H) 45.0 - 75.0 %    POCT Hematocrit Calculated, Venous 37.0 (L) 41.0 - 52.0 %    POCT Sodium, Venous 139 136 - 145 mmol/L    POCT Potassium, Venous 3.9 3.5 - 5.3 mmol/L    POCT Chloride, Venous 113 (H) 98 - 107 mmol/L    POCT Ionized Calicum, Venous 1.06 (L) 1.10 - 1.33 mmol/L    POCT Glucose, Venous 159 (H) 74 - 99 mg/dL    POCT Lactate, Venous 1.7 0.4 - 2.0 mmol/L    POCT Base Excess, Venous -10.0 (L) -2.0 - 3.0 mmol/L    POCT HCO3 Calculated, Venous 16.2 (L) 22.0 - 26.0 mmol/L    POCT Hemoglobin, Venous 12.4 (L) 13.5 - 17.5 g/dL    POCT Anion Gap, Venous 14.0 10.0 - 25.0 mmol/L    Patient Temperature 37.0 degrees Celsius    FiO2 30 %   Troponin, High Sensitivity, 1 Hour   Result Value Ref Range    Troponin I, High  Sensitivity (CMC) 85 (H) 0 - 53 ng/L   Blood Culture    Specimen: Peripheral Venipuncture; Blood culture   Result Value Ref Range    Blood Culture Loaded on Instrument - Culture in progress    Blood Culture    Specimen: Peripheral Venipuncture; Blood culture   Result Value Ref Range    Blood Culture Loaded on Instrument - Culture in progress    Blood Gas Lactic Acid, Venous   Result Value Ref Range    POCT Lactate, Venous 1.2 0.4 - 2.0 mmol/L   Blood Gas Venous Full Panel   Result Value Ref Range    POCT pH, Venous 7.28 (L) 7.33 - 7.43 pH    POCT pCO2, Venous 40 (L) 41 - 51 mm Hg    POCT pO2, Venous 65 (H) 35 - 45 mm Hg    POCT SO2, Venous 92 (H) 45 - 75 %    POCT Oxy Hemoglobin, Venous 90.1 (H) 45.0 - 75.0 %    POCT Hematocrit Calculated, Venous 41.0 41.0 - 52.0 %    POCT Sodium, Venous 136 136 - 145 mmol/L    POCT Potassium, Venous 4.6 3.5 - 5.3 mmol/L    POCT Chloride, Venous 109 (H) 98 - 107 mmol/L    POCT Ionized Calicum, Venous 1.31 1.10 - 1.33 mmol/L    POCT Glucose, Venous 171 (H) 74 - 99 mg/dL    POCT Lactate, Venous 1.2 0.4 - 2.0 mmol/L    POCT Base Excess, Venous -7.5 (L) -2.0 - 3.0 mmol/L    POCT HCO3 Calculated, Venous 18.8 (L) 22.0 - 26.0 mmol/L    POCT Hemoglobin, Venous 13.7 13.5 - 17.5 g/dL    POCT Anion Gap, Venous 13.0 10.0 - 25.0 mmol/L    Patient Temperature 37.0 degrees Celsius    FiO2 21 %   POCT GLUCOSE   Result Value Ref Range    POCT Glucose 121 (H) 74 - 99 mg/dL   Blood Gas Arterial Full Panel   Result Value Ref Range    POCT pH, Arterial 7.31 (L) 7.38 - 7.42 pH    POCT pCO2, Arterial 29 (L) 38 - 42 mm Hg    POCT pO2, Arterial 104 (H) 85 - 95 mm Hg    POCT SO2, Arterial 98 94 - 100 %    POCT Oxy Hemoglobin, Arterial 96.5 94.0 - 98.0 %    POCT Hematocrit Calculated, Arterial 40.0 (L) 41.0 - 52.0 %    POCT Sodium, Arterial 138 136 - 145 mmol/L    POCT Potassium, Arterial 4.0 3.5 - 5.3 mmol/L    POCT Chloride, Arterial 110 (H) 98 - 107 mmol/L    POCT Ionized Calcium, Arterial 1.22 1.10 - 1.33  mmol/L    POCT Glucose, Arterial 138 (H) 74 - 99 mg/dL    POCT Lactate, Arterial 0.9 0.4 - 2.0 mmol/L    POCT Base Excess, Arterial -10.3 (L) -2.0 - 3.0 mmol/L    POCT HCO3 Calculated, Arterial 14.6 (L) 22.0 - 26.0 mmol/L    POCT Hemoglobin, Arterial 13.4 (L) 13.5 - 17.5 g/dL    POCT Anion Gap, Arterial 17 10 - 25 mmo/L    Patient Temperature 37.0 degrees Celsius    FiO2 60 %      No MRI head results found for the past 14 days  CT head wo IV contrast    Result Date: 1/27/2025  Interpreted By:  Curtis King and Ritchie Brandon STUDY: CT HEAD WO IV CONTRAST;  1/26/2025 8:01 pm   INDICATION: Signs/Symptoms:lethargy, ams. Per chart review, CT head at [facility] on 01/18/2025 showing hypodensity the right lacunar region prompting MRI brain which showed remote infarct.   COMPARISON: CT head 01/28/2018.   ACCESSION NUMBER(S): [id number]   ORDERING CLINICIAN: LEIDY SOMMERS   TECHNIQUE: Noncontrast axial CT scan of head was performed. Angled reformats in brain and bone windows were generated. The images were reviewed in bone, brain, blood and soft tissue windows.   FINDINGS: There are areas of encephalomalacia/gliosis noted along the inferior frontal lobes and anterior inferior left temporal lobe in a location suggesting sequelae of previous more remote posttraumatic contusions.   There are nonspecific white matter changes noted within cerebral hemispheres bilaterally most pronounced bifrontally left right greater than left.   Focal hypodensities are identified within the basal ganglia bilaterally.   The ventricular system is nondilated.   No hyperdense acute intracranial hemorrhage is noted.   There is no midline shift.   Atherosclerotic calcification noted along the carotid siphons.   The paranasal sinuses and mastoid air cells are clear.       There are areas of encephalomalacia/gliosis noted along the inferior frontal lobes and anterior inferior left temporal lobe in a location suggesting sequelae of previous more remote  posttraumatic contusions.   There are nonspecific white matter changes noted within cerebral hemispheres bilaterally most pronounced bifrontally left right greater than left. Focal hypodensities are identified within the basal ganglia bilaterally.     I personally reviewed the images/study and I agree with the findings as stated by resident Bradley Torrez. This study was interpreted at [facility], [place], [place].   MACRO: None     Signed by: Curtis King 1/27/2025 4:02 AM Dictation workstation:   [id number]   Transthoracic Echo (TTE) Complete    Result Date: 1/27/2025   [facility], [address] [place] [address]                Tel [phone] and Fax [phone] TRANSTHORACIC ECHOCARDIOGRAM REPORT  Patient Name:       DAYA Vann Physician:    [id number] Daniel Woods MD Study Date:         1/27/2025           Ordering Provider:    [id number] LAVELLE MACK MRN/PID:            [id number]            Fellow:               [id number] Charlie Heart MD Accession#:         [id number]        Nurse: Date of Birth/Age:  1980 / 44      Sonographer:          Alejandra Crawford RDCS                     years Gender assigned at  M                   Additional Staff: Birth: Height:             170.18 cm           Admit Date:           1/26/2025 Weight:             86.18 kg            Admission Status:     Inpatient -                                                               Priority                                                               discharge BSA / BMI:          1.98 m2 / 29.76     Encounter#:           [id number]                     kg/m2 Blood Pressure:     135/94 mmHg         Department Location:  [facility]                                                         "       Non Invasive Study Type:    TRANSTHORACIC ECHO (TTE) COMPLETE Diagnosis/ICD: Essential (primary) hypertension-I10 Indication:    Hypertensive emergency CPT Code:      Echo Complete w Full Doppler-20319 Patient History: Pertinent History: Acute chest pain, Polysubstance abuse, SAH, HTN, DM, Flash                    pulmonary edema. Study Detail: The following Echo studies were performed: 2D, M-Mode, color flow,               Doppler and Strain.  PHYSICIAN INTERPRETATION: Left Ventricle: The left ventricular systolic function is mildly decreased, with a Brown's biplane calculated ejection fraction of 47%. There is severely increased concentric left ventricular hypertrophy. There are no regional left ventricular wall motion abnormalities. The left ventricular cavity size is normal. There is severely increased septal and severely increased posterior left ventricular wall thickness. Left Ventricular Global Longitudinal Strain - 6.5 %. Spectral Doppler shows a Grade III (restrictive pattern) of left ventricular diastolic filling with an elevated left atrial pressure. Strain values are abnormal and are consistent with impaired LV function. There is a \"B-bump\" suggesting incerased LV EDP. Left Atrium: The left atrium is severely dilated. Right Ventricle: The right ventricle is mildly enlarged. There is normal right ventricular global systolic function. Right Atrium: The right atrium is moderately dilated. Aortic Valve: The aortic valve is trileaflet. There are increased aortic valve velocities due to increased flow/dynamic ejection. There is trace aortic valve regurgitation. The peak instantaneous gradient of the aortic valve is 8 mmHg. Mitral Valve: The mitral valve is normal in structure. The peak instantaneous gradient of the mitral valve is 8 mmHg. There is trace to mild mitral valve regurgitation. Tricuspid Valve: The tricuspid valve is structurally normal. There is mild tricuspid regurgitation. The Doppler " estimated RVSP is mildly elevated at 41.5 mmHg. Pulmonic Valve: The pulmonic valve is structurally normal. There is physiologic pulmonic valve regurgitation. Pericardium: Trivial pericardial effusion. Aorta: The aortic root is normal. There is no dilatation of the aortic root. Systemic Veins: The inferior vena cava appears normal in size, with IVC inspiratory collapse greater than 50%. In comparison to the previous echocardiogram(s): Compared with study dated 1/20/2018, LV EF decreased (was hyperdynamic with mild LV obstruction) and there is grad 3 diastolic dysfunction (was grade 1).  CONCLUSIONS:  1. The left ventricular systolic function is mildly decreased, with a Brown's biplane calculated ejection fraction of 47%.  2. Spectral Doppler shows a Grade III (restrictive pattern) of left ventricular diastolic filling with an elevated left atrial pressure.  3. There is severely increased septal and severely increased posterior left ventricular wall thickness.  4. There is severely increased concentric left ventricular hypertrophy.  5. There is normal right ventricular global systolic function.  6. Mildly enlarged right ventricle.  7. The left atrium is severely dilated.  8. The right atrium is moderately dilated.  9. Mildly elevated right ventricular systolic pressure. 10. Left Ventricular Global Longitudinal Strain - 6.5 %. QUANTITATIVE DATA SUMMARY:  2D MEASUREMENTS:          Normal Ranges: Ao Root d:       2.70 cm  (2.0-3.7cm) LAs:             5.02 cm  (2.7-4.0cm) IVSd:            1.65 cm  (0.6-1.1cm) LVPWd:           1.63 cm  (0.6-1.1cm) LVIDd:           4.52 cm  (3.9-5.9cm) LVIDs:           3.36 cm LV Mass Index:   161 g/m2 LVEDV Index:     83 ml/m2 LV % FS          25.6 %  LA VOLUME:                    Normal Ranges: LA Vol A4C:        106.5 ml   (22+/-6mL/m2) LA Vol A2C:        84.1 ml LA Vol BP:         97.9 ml LA Vol Index A4C:  53.8 ml/m2 LA Vol Index A2C:  42.5 ml/m2 LA Vol Index BP:   49.5 ml/m2 LA Area  A4C:       29.4 cm2 LA Area A2C:       25.3 cm2 LA Major Axis A4C: 6.9 cm LA Major Axis A2C: 6.4 cm LA Vol A4C:        100.0 ml  RA VOLUME BY A/L METHOD:            Normal Ranges: RA Vol A4C:              87.3 ml    (8.3-19.5ml) RA Vol Index A4C:        44.1 ml/m2 RA Area A4C:             25.1 cm2 RA Major Axis A4C:       6.1 cm  LV SYSTOLIC FUNCTION BY 2D PLANIMETRY (MOD):                                        Normal Ranges: EF-A4C View:                      44 % (>=55%) EF-A2C View:                      51 % EF-Biplane:                       47 % LV EF Reported:                   47 % Global Longitudinal Strain (GLS): 7 %  LV DIASTOLIC FUNCTION:             Normal Ranges: MV Peak E:             1.07 m/s    (0.7-1.2 m/s) MV Peak A:             0.40 m/s    (0.42-0.7 m/s) E/A Ratio:             2.70        (1.0-2.2) MV e'                  0.022 m/s   (>8.0) MV lateral e'          0.02 m/s MV medial e'           0.02 m/s MV A Dur:              70.41 msec E/e' Ratio:            49.49       (<8.0) MV DT:                 72 msec     (150-240 msec) PulmV Sys Jeffrey:         33.21 cm/s PulmV Whitlock Jeffrey:        49.60 cm/s PulmV S/D Jeffrey:         0.67 PulmV A Revs Jeffrey:      28.48 cm/s PulmV A Revs Dur:      106.57 msec  MITRAL VALVE:          Normal Ranges: MV Vmax:      1.40 m/s (<=1.3m/s) MV peak P.9 mmHg (<5mmHg) MV mean P.6 mmHg (<2mmHg) MV VTI:       21.59 cm (10-13cm) MV DT:        72 msec  (150-240msec)  AORTIC VALVE:           Normal Ranges: AoV Vmax:      1.40 m/s (<=1.7m/s) AoV Peak P.9 mmHg (<20mmHg) LVOT Max Jeffrey:  1.33 m/s (<=1.1m/s) LVOT VTI:      19.69 cm LVOT Diameter: 1.90 cm  (1.8-2.4cm) AoV Area,Vmax: 2.67 cm2 (2.5-4.5cm2)  RIGHT VENTRICLE: RV Basal 4.21 cm RV Mid   2.26 cm RV Major 7.9 cm TAPSE:   23.4 mm RV s'    0.15 m/s  TRICUSPID VALVE/RVSP:          Normal Ranges: Peak TR Velocity:     3.10 m/s RV Syst Pressure:     42 mmHg  (< 30mmHg) IVC Diam:             1.90 cm  PULMONIC VALVE:           Normal Ranges: PV Accel Time:  69 msec  (>120ms) PV Max Jeffrey:     0.7 m/s  (0.6-0.9m/s) PV Max P.8 mmHg  Pulmonary Veins: PulmV A Revs Dur: 106.57 msec PulmV A Revs Jeffrey: 28.48 cm/s PulmV Whitlock Jeffrey:   49.60 cm/s PulmV S/D Jeffrey:    0.67 PulmV Sys Jeffrey:    33.21 cm/s  [id number] Daniel Woods MD Electronically signed on 2025 at 3:00:27 PM  ** Final **          BNP   Date/Time Value Ref Range Status   2018 07:38 AM <2 0 - 99 pg/mL Final     Comment:     .  <100 pg/mL - Heart failure unlikely  100-299 pg/mL - Intermediate probability of acute heart  .               failure exacerbation. Correlate with clinical  .               context and patient history.    >=300 pg/mL - Heart Failure likely. Correlate with clinical  .               context and patient history.  BNP testing is performed using different testing   methodology at [facility] than at other   [facility] hospitals. Direct result comparisons should   only be made within the same method.          I have personally reviewed the following imaging results     Transthoracic Echo (TTE) Complete  Result Date: 2025   [facility], [address] [place] [address]                Tel [phone] and Fax [phone] TRANSTHORACIC ECHOCARDIOGRAM REPORT  Patient Name:       DAYA SHUKLA       Reading Physician:    [id number] Daniel Woods MD Study Date:         2025           Ordering Provider:    [id number] LAVELLE MACK MRN/PID:            [id number]            Fellow:               [id number] Charlie Heart MD Accession#:         [id number]        Nurse: Date of Birth/Age:  1980      Sonographer:          Alejandra Crawford RDCS                     years Gender assigned at                     Additional Staff: Birth: Height:             170.18 cm   "         Admit Date:           1/26/2025 Weight:             86.18 kg            Admission Status:     Inpatient -                                                               Priority                                                               discharge BSA / BMI:          1.98 m2 / 29.76     Encounter#:           [id number]                     kg/m2 Blood Pressure:     135/94 mmHg         Department Location:  [facility]                                                               Non Invasive Study Type:    TRANSTHORACIC ECHO (TTE) COMPLETE Diagnosis/ICD: Essential (primary) hypertension-I10 Indication:    Hypertensive emergency CPT Code:      Echo Complete w Full [id number] Patient History: Pertinent History: Acute chest pain, Polysubstance abuse, SAH, HTN, DM, Flash                    pulmonary edema. Study Detail: The following Echo studies were performed: 2D, M-Mode, color flow,               Doppler and Strain.  PHYSICIAN INTERPRETATION: Left Ventricle: The left ventricular systolic function is mildly decreased, with a Brown's biplane calculated ejection fraction of 47%. There is severely increased concentric left ventricular hypertrophy. There are no regional left ventricular wall motion abnormalities. The left ventricular cavity size is normal. There is severely increased septal and severely increased posterior left ventricular wall thickness. Left Ventricular Global Longitudinal Strain - 6.5 %. Spectral Doppler shows a Grade III (restrictive pattern) of left ventricular diastolic filling with an elevated left atrial pressure. Strain values are abnormal and are consistent with impaired LV function. There is a \"B-bump\" suggesting incerased LV EDP. Left Atrium: The left atrium is severely dilated. Right Ventricle: The right ventricle is mildly enlarged. There is normal right ventricular global systolic function. Right Atrium: The right atrium is moderately dilated. Aortic Valve: The aortic valve " is trileaflet. There are increased aortic valve velocities due to increased flow/dynamic ejection. There is trace aortic valve regurgitation. The peak instantaneous gradient of the aortic valve is 8 mmHg. Mitral Valve: The mitral valve is normal in structure. The peak instantaneous gradient of the mitral valve is 8 mmHg. There is trace to mild mitral valve regurgitation. Tricuspid Valve: The tricuspid valve is structurally normal. There is mild tricuspid regurgitation. The Doppler estimated RVSP is mildly elevated at 41.5 mmHg. Pulmonic Valve: The pulmonic valve is structurally normal. There is physiologic pulmonic valve regurgitation. Pericardium: Trivial pericardial effusion. Aorta: The aortic root is normal. There is no dilatation of the aortic root. Systemic Veins: The inferior vena cava appears normal in size, with IVC inspiratory collapse greater than 50%. In comparison to the previous echocardiogram(s): Compared with study dated 1/20/2018, LV EF decreased (was hyperdynamic with mild LV obstruction) and there is grad 3 diastolic dysfunction (was grade 1).  CONCLUSIONS:  1. The left ventricular systolic function is mildly decreased, with a Brown's biplane calculated ejection fraction of 47%.  2. Spectral Doppler shows a Grade III (restrictive pattern) of left ventricular diastolic filling with an elevated left atrial pressure.  3. There is severely increased septal and severely increased posterior left ventricular wall thickness.  4. There is severely increased concentric left ventricular hypertrophy.  5. There is normal right ventricular global systolic function.  6. Mildly enlarged right ventricle.  7. The left atrium is severely dilated.  8. The right atrium is moderately dilated.  9. Mildly elevated right ventricular systolic pressure. 10. Left Ventricular Global Longitudinal Strain - 6.5 %. QUANTITATIVE DATA SUMMARY:  2D MEASUREMENTS:          Normal Ranges: Ao Root d:       2.70 cm  (2.0-3.7cm) LAs:              5.02 cm  (2.7-4.0cm) IVSd:            1.65 cm  (0.6-1.1cm) LVPWd:           1.63 cm  (0.6-1.1cm) LVIDd:           4.52 cm  (3.9-5.9cm) LVIDs:           3.36 cm LV Mass Index:   161 g/m2 LVEDV Index:     83 ml/m2 LV % FS          25.6 %  LA VOLUME:                    Normal Ranges: LA Vol A4C:        106.5 ml   (22+/-6mL/m2) LA Vol A2C:        84.1 ml LA Vol BP:         97.9 ml LA Vol Index A4C:  53.8 ml/m2 LA Vol Index A2C:  42.5 ml/m2 LA Vol Index BP:   49.5 ml/m2 LA Area A4C:       29.4 cm2 LA Area A2C:       25.3 cm2 LA Major Axis A4C: 6.9 cm LA Major Axis A2C: 6.4 cm LA Vol A4C:        100.0 ml  RA VOLUME BY A/L METHOD:            Normal Ranges: RA Vol A4C:              87.3 ml    (8.3-19.5ml) RA Vol Index A4C:        44.1 ml/m2 RA Area A4C:             25.1 cm2 RA Major Axis A4C:       6.1 cm  LV SYSTOLIC FUNCTION BY 2D PLANIMETRY (MOD):                                        Normal Ranges: EF-A4C View:                      44 % (>=55%) EF-A2C View:                      51 % EF-Biplane:                       47 % LV EF Reported:                   47 % Global Longitudinal Strain (GLS): 7 %  LV DIASTOLIC FUNCTION:             Normal Ranges: MV Peak E:             1.07 m/s    (0.7-1.2 m/s) MV Peak A:             0.40 m/s    (0.42-0.7 m/s) E/A Ratio:             2.70        (1.0-2.2) MV e'                  0.022 m/s   (>8.0) MV lateral e'          0.02 m/s MV medial e'           0.02 m/s MV A Dur:              70.41 msec E/e' Ratio:            49.49       (<8.0) MV DT:                 72 msec     (150-240 msec) PulmV Sys Jeffrey:         33.21 cm/s PulmV Whitlock Jeffrey:        49.60 cm/s PulmV S/D Jeffrey:         0.67 PulmV A Revs Jeffrey:      28.48 cm/s PulmV A Revs Dur:      106.57 msec  MITRAL VALVE:          Normal Ranges: MV Vmax:      1.40 m/s (<=1.3m/s) MV peak P.9 mmHg (<5mmHg) MV mean P.6 mmHg (<2mmHg) MV VTI:       21.59 cm (10-13cm) MV DT:        72 msec  (150-240msec)  AORTIC VALVE:           Normal  Ranges: AoV Vmax:      1.40 m/s (<=1.7m/s) AoV Peak P.9 mmHg (<20mmHg) LVOT Max Jeffrey:  1.33 m/s (<=1.1m/s) LVOT VTI:      19.69 cm LVOT Diameter: 1.90 cm  (1.8-2.4cm) AoV Area,Vmax: 2.67 cm2 (2.5-4.5cm2)  RIGHT VENTRICLE: RV Basal 4.21 cm RV Mid   2.26 cm RV Major 7.9 cm TAPSE:   23.4 mm RV s'    0.15 m/s  TRICUSPID VALVE/RVSP:          Normal Ranges: Peak TR Velocity:     3.10 m/s RV Syst Pressure:     42 mmHg  (< 30mmHg) IVC Diam:             1.90 cm  PULMONIC VALVE:          Normal Ranges: PV Accel Time:  69 msec  (>120ms) PV Max Jeffrey:     0.7 m/s  (0.6-0.9m/s) PV Max P.8 mmHg  Pulmonary Veins: PulmV A Revs Dur: 106.57 msec PulmV A Revs Jeffrey: 28.48 cm/s PulmV Whitlock Jeffrey:   49.60 cm/s PulmV S/D Jeffrey:    0.67 PulmV Sys Jeffrey:    33.21 cm/s  [id number] Daniel Woods MD Electronically signed on 2025 at 3:00:27 PM  ** Final **     CT head wo IV contrast  Result Date: 2025  Interpreted By:  Curtis King and Ritchie Brandon STUDY: CT HEAD WO IV CONTRAST;  2025 8:01 pm   INDICATION: Signs/Symptoms:lethargy, ams. Per chart review, CT head at [facility] on 2025 showing hypodensity the right lacunar region prompting MRI brain which showed remote infarct.   COMPARISON: CT head 2018.   ACCESSION NUMBER(S): [id number]   ORDERING CLINICIAN: LEIDY SOMMERS   TECHNIQUE: Noncontrast axial CT scan of head was performed. Angled reformats in brain and bone windows were generated. The images were reviewed in bone, brain, blood and soft tissue windows.   FINDINGS: There are areas of encephalomalacia/gliosis noted along the inferior frontal lobes and anterior inferior left temporal lobe in a location suggesting sequelae of previous more remote posttraumatic contusions.   There are nonspecific white matter changes noted within cerebral hemispheres bilaterally most pronounced bifrontally left right greater than left.   Focal hypodensities are identified within the basal ganglia bilaterally.   The ventricular  system is nondilated.   No hyperdense acute intracranial hemorrhage is noted.   There is no midline shift.   Atherosclerotic calcification noted along the carotid siphons.   The paranasal sinuses and mastoid air cells are clear.       There are areas of encephalomalacia/gliosis noted along the inferior frontal lobes and anterior inferior left temporal lobe in a location suggesting sequelae of previous more remote posttraumatic contusions.   There are nonspecific white matter changes noted within cerebral hemispheres bilaterally most pronounced bifrontally left right greater than left. Focal hypodensities are identified within the basal ganglia bilaterally.     I personally reviewed the images/study and I agree with the findings as stated by resident Bradley Torrez. This study was interpreted at [place], [place].   MACRO: None     Signed by: Curtis King 1/27/2025 4:02 AM Dictation workstation:   [id number]    MR brain wo IV contrast  Result Date: 1/19/2025  * * *Final Report* * * DATE OF EXAM: Jan 18 2025 11:16PM   QBM   0294  -  MRI BRAIN WO IVCON  / ACCESSION #  [id number] PROCEDURE REASON: NICHOLE      * * * * Physician Interpretation * * * *  EXAMINATION: MRI BRAIN WO IVCON CLINICAL HISTORY: Left-sided symptoms TECHNIQUE: Routine noncontrast MRI protocol including diffusion images. MQ: [id number] COMPARISON: CT brain 12/30/2024, CTA head/neck 01/17/2025 RESULT: Acute Change: There is no evidence of restricted diffusion to suggest an acute infarct. Hemorrhage: Foci of susceptibility corresponding to areas of encephalomalacia and gliosis in the bilateral frontal lobes and lateral left temporal lobe, likely remote traumatic hemorrhagic injuries. Mass Lesion/ Mass Effect: No evidence of an intracranial mass or extra-axial fluid collection.  No significant mass effect. Chronic Change: Scattered punctate foci of increased T2 and FLAIR signal are noted in the supratentorial  white matter which is a nonspecific finding, but likely represents minimal chronic microvascular ischemia.   Multiple remote lacunar infarcts, primarily in the basal ganglia, right corona radiata, as well as along the corpus callosum and right centrum semiovale.  Foci of encephalomalacia and gliosis in the inferior frontal lobes and lateral left temporal lobe most likely related to prior trauma based on location. Parenchyma: There is mild generalized parenchymal volume loss. Ventricles: Ventriculomegaly corresponds to the degree of parenchymal volume loss. Skull Base: Hypothalamic and pituitary region are grossly normal.   Craniocervical junction is normal. No significant marrow replacement process. Vasculature: Major intracranial arterial structures, and dural venous sinuses show typical flow void, suggesting patency by spin echo criteria. Other: The visualized paranasal sinuses and mastoid air cells are clear.   The orbits and extracranial soft tissues are unremarkable.    IMPRESSION: 1.  No evidence of acute intracranial abnormality. 2.  Chronic findings as detailed, including remote ischemic injuries as well as remote presumed traumatic injuries. : NAPOLEON   Transcribe Date/Time: Jan 19 2025 12:27A Dictated by : LESLIE JACOB MD This examination was interpreted and the report reviewed and electronically signed by: LESLIE JACOB MD on Jan 19 2025 12:37AM  EST    CTA HEAD WO/W IVCON  Result Date: 1/17/2025  * * *Final Report* * * DATE OF EXAM: Jan 17 2025  5:46AM   [facility]   0023  -  CTA HEAD WO/W IVCON  / ACCESSION #  [id number] PROCEDURE REASON: Ataxia, stroke suspected      * * * * Physician Interpretation * * * *  EXAMINATION:  CTA HEAD WO/W IVCON, CTA NECK W IVCON CLINICAL HISTORY:  Ataxia, stroke suspected.  Headache, sudden, severe, TECHNIQUE:    Routine CT of the brain without IV contrast.   Next, high resolution axial images were obtained through the head, neck and superior mediastinum following  bolus administration of intravenous contrast for CT angiography.   Post-processed 3D maximum intensity projections were created, reviewed and archived. MQ:  [id number] CTA: Post-processed images were created, reviewed and archived. Contrast:  80 mL Omnipaque 350 IV CT Dose-Length Product (DLP): 1307 mGy*cm CT Dose Reduction Employed: No dose reduction techniques were required; COMPARISON: 12/30/2024 brain CT RESULT: BRAIN (non-contrast): Acute change:  No evidence of a sizable/large acute territorial brain infarct/parenchymal edema.  MRI may be considered as a more sensitive modality if continued clinical concern/warranted. Hemorrhage: No evidence of acute intracranial hemorrhage. Mass Lesion / Mass Effect: There is no evidence of a sizable brain mass.   No significant mass effect or extra-axial fluid collection. Chronic changes, including parenchymal: Mild intracranial arterial wall calcifications. Reidentified lacunar infarctions in the left-sided striatocapsular region and right-sided frontal corona radiata, and possibly elsewhere in the deep gray structures.  Also small area of abnormal hypodensity/remote insult in the RIGHT aspect of the corpus callosum anteriorly, as before.    Also reidentified small areas of encephalomalacia in the bifrontal regions inferiorly as well as in the left-sided temporal lobe adjacent petrous ridge, in locations suggestive of sequelae of remote trauma.   Scattered patchy foci of low attenuation are present within supratentorial white matter, which is nonspecific, but most commonly on the basis of mild microvascular ischemia. There is no significant generalized volume loss for age. Ventricles: Commensurate with sulcal sizes.  No hydrocephalus. Visualized paranasal sinuses: Occasional minimal mucosal thickening. Other: No depressed skull fracture is seen. OTHER: Lung apices: Motion artifact limitation. Mild streaky opacities of probable subsegmental atelectasis.  Suggested mild  small airways wall thickening/probable small airways disease. Spine: The study protocol is not dedicated to its detailed assessment.   Degenerative spine changes, with estimated up to moderate or slightly more neural foraminal stenoses and mild cord indentations, without grossly visible high-grade spinal canal stenosis. Soft tissues/Other: No grossly visible sizable soft tissue mass in the neck or superior mediastinum.     Features of periodontal disease and dental caries (such as reference 6:253 in the left-sided mandible). CT ARTERIOGRAM: Some motion artifact limitation. Extracranial Circulation: Aortic Arch: Minimal atherosclerotic changes are present in the arch. Standard branching pattern of the aortic arch. No significant stenoses are seen in the proximal brachiocephalic/subclavian arteries (allowing for adjacent/contrast artifact, if present). Other vasculature comments: The incidentally imaged pulmonary arteries are suboptimally assessed. Carotid stenosis: Right common:  No significant stenosis. Right internal carotid plaque:  Minimal atherosclerotic changes. Right internal carotid stenosis (% by NASCET Criteria):  0/non-measurable Carotid stenosis: Left common:  No significant stenosis. Left internal carotid plaque:  Mild atherosclerotic changes. Left internal carotid stenosis (% by NASCET Criteria):  30 proximally Cervical vertebral arteries: Patency:  Bilateral.  There may be mild narrowing at the left-sided vertebral artery origin. Dominance:  Codominant. Intracranial Circulation: Calcifications/atherosclerotic changes are present in the ICAs (with stenoses of estimated mild-moderate bilaterally), and left-sided vertebral artery V4 segment (mild).  Scattered intracranial arterial stenoses elsewhere, including in the right-sided MCA (estimated mild, including in the M1 segment), left-sided MCA (moderate-severe in the distal M1 segment, and grossly mild elsewhere), NATALIIA (estimated mild), PCAs (up to  moderate in the right-sided P2 segment, and grossly mild elsewhere bilaterally), vertebral arteries (mild), and grossly mild additional areas elsewhere. Patent (in the well-visualized portions) bilateral internal carotid, middle cerebral, anterior cerebral, posterior cerebral and vertebral arteries, and the basilar artery. The other major vertebrobasilar branches also show patency, at least in the well visualized portions. No visible sizable/large intracranial aneurysm. Though the study protocol is not dedicated to their detailed assessment, no gross acute dural venous sinus thrombosis is seen.  (topogram) images: Non-diagnostic.    IMPRESSION: Non-contrast CT brain shows no evidence of an acute intracranial bleed, or a sizable territorial ischemic infarct.  Chronic intracranial changes as above, including scattered small/lacunar infarctions, and posttraumatic pattern of frontotemporal encephalomalacia. Head and Neck CTA show no evidence of a proximal/large artery occlusion or high grade stenosis, acute dissection, or sizable/large aneurysm.   Atherosclerotic changes and related stenoses, and also scattered nonspecific arterial stenoses elsewhere as above, including moderate-severe in the mid left-sided MCA M1 segment. Other details above, including features of periodontal disease and dental caries, some degenerative spine changes, etc. Arterial blood flow was measured to detect acute large vessel occlusion by computer aided detection software: Not Performed. Concordance between software and imaging review: Not Applicable. : NAPOLEON   Transcribe Date/Time: Jan 17 2025  5:47A Dictated by : SHAYY GIPSON, DO This examination was interpreted and the report reviewed and electronically signed by: JUAN MIGUEL SCHUSTER MD on Jan 17 2025  7:11AM  EST    Stroke Alert CT/MRI review: Actual date and time 2/4 1400      IV Thrombolysis IV Thrombolysis Checklist  IV Thrombolysis Given: No; Thrombolysis  contraindication reason: Time from Last Known Well (or stroke onset) is >4.5 hours     Assessment/Plan   Theron Moran is a 44 y.o. male with PMHx traumatic SAH, polysubstance use (cocaine), HTN, DM2, gout, mood disorder presenting with chest pain and uncontrolled HTN. BAT called for confusion and left sided weakness. Exam showed dysarthria, L sided weakness/numbness, which patient reported to be chronic. Given absence of deficits on provider exams in the past, suspect transient worsening of existing stroke (chronic strokes noted on MRI brain report from [facility]) i/s/o hypotension. Patient was not a candidate for TNK or MT given suspected chronic vs transient symptoms    Type: Ischemic stroke  Subtype/etiology: small vessel   Vessels involved: R MCA  Neurological manifestations: mild L FD, LUE and LLE AG, moderate dysarthria, left sided sensory loss  NIHSS (worst at presentation): 7   Diagnostic evaluation: prior CTH, CTA, MRI, TTE  Antiplatelet/antithrombotic plan for stroke prevention: ASA  VTE prophylaxis: ok for chemical DVT ppx    RECOMMENDATIONS  - obtain imaging from [facility]: CTA H/N 1/17, MRI brain 1/18  - continue ASA, atorvastatin  - avoid hypotension to maintain cerebral perfusion    Patient to be seen and discussed with attending physician, Dr. Bazzi.    Vascular Risk Factor modification goals:  Blood pressure goals: avoid hypotension SBP <100 that could worsen cerebral perfusion,  normotension, avoid hypotension  Lipid Goals: education on healthy diet and statin therapy to maintain or achieve goal LDL-cholesterol < 70mg  Glucose Goals: early treatment of hyperglycemia to goal glucose 140-180 mg/dl with long-term goal A1c < 7%   Smoking Cessation and Education  Assessment for Rehabilitation needs   Patient and family education on signs and symptoms of stroke, calling 911, healthy strategies for stroke prevention.       Meryl Osorio  PGY-4 Neurology

## 2025-02-04 NOTE — NURSING NOTE
Around 0920 this morning patient called RN to room c/o increased shortness of breath and chest discomfort. RN obtained a set of vitals /75 HR 65 SpO2 96% RA and patient was placed on 2L nasal cannula for comfort. Patient did received 25mg Carvedilol, 100mg Metoprolol and 5mg Oxycodone this morning in preparation for CTA imaging and foot pain respectively. Rapid response team was called for significant change in patient condition with EKG and fluid bolus ordered. Bloodwork collected during the rapid revealed elevated lactate and metabolic acidosis; primary team decided to transfer patient to CICU for higher level of care and report called to charge nurse at this time.

## 2025-02-04 NOTE — NURSING NOTE
1245: pt attempting to sit up at the bedside. RN at the bedside. Pt became acutely aphasic. Left facial droop noted. BAT called. CICU team and attending at the bedside. Glucagon drip started. Neuro team at the bedside assessing patient. A-line placed.

## 2025-02-04 NOTE — NURSING NOTE
Pt refusing care even after education by RN. Will not allow RN to obtain blood sugar. Pt repeatedly calling RN vulgar terms. Pt refusing to stay in bed. Ripping off tele, and other equipment to walk himself to the bathroom after RN educated importance in staying in bed and using bed maynard. MD Osei aware.

## 2025-02-05 PROBLEM — E11.69 TYPE 2 DIABETES MELLITUS WITH OTHER SPECIFIED COMPLICATION: Status: ACTIVE | Noted: 2025-02-05

## 2025-02-05 PROBLEM — G45.9 TIA (TRANSIENT ISCHEMIC ATTACK): Status: ACTIVE | Noted: 2025-02-05

## 2025-02-05 PROBLEM — F17.200 TOBACCO DEPENDENCE: Status: ACTIVE | Noted: 2025-02-05

## 2025-02-05 PROBLEM — E78.5 DYSLIPIDEMIA: Status: ACTIVE | Noted: 2025-02-05

## 2025-02-05 LAB
ALBUMIN SERPL BCP-MCNC: 3.2 G/DL (ref 3.4–5)
ANION GAP SERPL CALC-SCNC: 13 MMOL/L (ref 10–20)
BASOPHILS # BLD AUTO: 0.07 X10*3/UL (ref 0–0.1)
BASOPHILS NFR BLD AUTO: 0.5 %
BUN SERPL-MCNC: 34 MG/DL (ref 6–23)
CALCIUM SERPL-MCNC: 8.9 MG/DL (ref 8.6–10.6)
CHLORIDE SERPL-SCNC: 111 MMOL/L (ref 98–107)
CO2 SERPL-SCNC: 19 MMOL/L (ref 21–32)
CREAT SERPL-MCNC: 1.41 MG/DL (ref 0.5–1.3)
EGFRCR SERPLBLD CKD-EPI 2021: 63 ML/MIN/1.73M*2
EOSINOPHIL # BLD AUTO: 0.16 X10*3/UL (ref 0–0.7)
EOSINOPHIL NFR BLD AUTO: 1.2 %
ERYTHROCYTE [DISTWIDTH] IN BLOOD BY AUTOMATED COUNT: 14.6 % (ref 11.5–14.5)
GLUCOSE BLD MANUAL STRIP-MCNC: 108 MG/DL (ref 74–99)
GLUCOSE BLD MANUAL STRIP-MCNC: 131 MG/DL (ref 74–99)
GLUCOSE BLD MANUAL STRIP-MCNC: 187 MG/DL (ref 74–99)
GLUCOSE BLD MANUAL STRIP-MCNC: 205 MG/DL (ref 74–99)
GLUCOSE BLD MANUAL STRIP-MCNC: 83 MG/DL (ref 74–99)
GLUCOSE BLD MANUAL STRIP-MCNC: 85 MG/DL (ref 74–99)
GLUCOSE BLD MANUAL STRIP-MCNC: 86 MG/DL (ref 74–99)
GLUCOSE BLD MANUAL STRIP-MCNC: 91 MG/DL (ref 74–99)
GLUCOSE BLD MANUAL STRIP-MCNC: 93 MG/DL (ref 74–99)
GLUCOSE BLD MANUAL STRIP-MCNC: 95 MG/DL (ref 74–99)
GLUCOSE BLD MANUAL STRIP-MCNC: 95 MG/DL (ref 74–99)
GLUCOSE SERPL-MCNC: 94 MG/DL (ref 74–99)
HCT VFR BLD AUTO: 43.7 % (ref 41–52)
HGB BLD-MCNC: 14.3 G/DL (ref 13.5–17.5)
IMM GRANULOCYTES # BLD AUTO: 0.08 X10*3/UL (ref 0–0.7)
IMM GRANULOCYTES NFR BLD AUTO: 0.6 % (ref 0–0.9)
LYMPHOCYTES # BLD AUTO: 2.06 X10*3/UL (ref 1.2–4.8)
LYMPHOCYTES NFR BLD AUTO: 15.7 %
MAGNESIUM SERPL-MCNC: 1.8 MG/DL (ref 1.6–2.4)
MCH RBC QN AUTO: 26.1 PG (ref 26–34)
MCHC RBC AUTO-ENTMCNC: 32.7 G/DL (ref 32–36)
MCV RBC AUTO: 80 FL (ref 80–100)
MONOCYTES # BLD AUTO: 1.16 X10*3/UL (ref 0.1–1)
MONOCYTES NFR BLD AUTO: 8.9 %
NEUTROPHILS # BLD AUTO: 9.56 X10*3/UL (ref 1.2–7.7)
NEUTROPHILS NFR BLD AUTO: 73.1 %
NRBC BLD-RTO: 0 /100 WBCS (ref 0–0)
PHOSPHATE SERPL-MCNC: 4.5 MG/DL (ref 2.5–4.9)
PLATELET # BLD AUTO: 187 X10*3/UL (ref 150–450)
POTASSIUM SERPL-SCNC: 4.3 MMOL/L (ref 3.5–5.3)
RBC # BLD AUTO: 5.47 X10*6/UL (ref 4.5–5.9)
SODIUM SERPL-SCNC: 139 MMOL/L (ref 136–145)
WBC # BLD AUTO: 13.1 X10*3/UL (ref 4.4–11.3)

## 2025-02-05 PROCEDURE — 2500000001 HC RX 250 WO HCPCS SELF ADMINISTERED DRUGS (ALT 637 FOR MEDICARE OP)

## 2025-02-05 PROCEDURE — 2500000004 HC RX 250 GENERAL PHARMACY W/ HCPCS (ALT 636 FOR OP/ED)

## 2025-02-05 PROCEDURE — 85025 COMPLETE CBC W/AUTO DIFF WBC: CPT

## 2025-02-05 PROCEDURE — 83735 ASSAY OF MAGNESIUM: CPT

## 2025-02-05 PROCEDURE — 1200000002 HC GENERAL ROOM WITH TELEMETRY DAILY

## 2025-02-05 PROCEDURE — 37799 UNLISTED PX VASCULAR SURGERY: CPT

## 2025-02-05 PROCEDURE — 36620 INSERTION CATHETER ARTERY: CPT | Performed by: STUDENT IN AN ORGANIZED HEALTH CARE EDUCATION/TRAINING PROGRAM

## 2025-02-05 PROCEDURE — 2500000002 HC RX 250 W HCPCS SELF ADMINISTERED DRUGS (ALT 637 FOR MEDICARE OP, ALT 636 FOR OP/ED)

## 2025-02-05 PROCEDURE — 82947 ASSAY GLUCOSE BLOOD QUANT: CPT

## 2025-02-05 PROCEDURE — 80069 RENAL FUNCTION PANEL: CPT

## 2025-02-05 PROCEDURE — 92610 EVALUATE SWALLOWING FUNCTION: CPT | Mod: GN

## 2025-02-05 PROCEDURE — 99223 1ST HOSP IP/OBS HIGH 75: CPT

## 2025-02-05 RX ORDER — HYDRALAZINE HYDROCHLORIDE 10 MG/1
25 TABLET, FILM COATED ORAL 3 TIMES DAILY PRN
Status: DISCONTINUED | OUTPATIENT
Start: 2025-02-05 | End: 2025-02-06

## 2025-02-05 RX ORDER — MAGNESIUM SULFATE HEPTAHYDRATE 40 MG/ML
2 INJECTION, SOLUTION INTRAVENOUS ONCE
Status: COMPLETED | OUTPATIENT
Start: 2025-02-05 | End: 2025-02-05

## 2025-02-05 RX ORDER — PREDNISOLONE 5 MG/1
35 TABLET ORAL DAILY
Status: DISCONTINUED | OUTPATIENT
Start: 2025-02-05 | End: 2025-02-05

## 2025-02-05 RX ORDER — TRAMADOL HYDROCHLORIDE 50 MG/1
25 TABLET ORAL ONCE
Status: COMPLETED | OUTPATIENT
Start: 2025-02-05 | End: 2025-02-05

## 2025-02-05 RX ORDER — ACETAMINOPHEN 10 MG/ML
1000 INJECTION, SOLUTION INTRAVENOUS ONCE
Status: COMPLETED | OUTPATIENT
Start: 2025-02-05 | End: 2025-02-05

## 2025-02-05 RX ORDER — PREDNISOLONE 5 MG/1
35 TABLET ORAL DAILY
Status: DISCONTINUED | OUTPATIENT
Start: 2025-02-05 | End: 2025-02-06

## 2025-02-05 RX ADMIN — MAGNESIUM SULFATE HEPTAHYDRATE 2 G: 40 INJECTION, SOLUTION INTRAVENOUS at 08:08

## 2025-02-05 RX ADMIN — PREDNISOLONE 35 MG: 5 TABLET ORAL at 18:26

## 2025-02-05 RX ADMIN — TRAMADOL HYDROCHLORIDE 25 MG: 50 TABLET, COATED ORAL at 21:52

## 2025-02-05 RX ADMIN — ENOXAPARIN SODIUM 40 MG: 100 INJECTION SUBCUTANEOUS at 20:49

## 2025-02-05 RX ADMIN — PANTOPRAZOLE SODIUM 40 MG: 40 TABLET, DELAYED RELEASE ORAL at 08:07

## 2025-02-05 RX ADMIN — COLCHICINE 0.6 MG: 0.6 TABLET ORAL at 20:49

## 2025-02-05 RX ADMIN — THIAMINE HCL TAB 100 MG 100 MG: 100 TAB at 09:05

## 2025-02-05 RX ADMIN — ATORVASTATIN CALCIUM 80 MG: 80 TABLET, FILM COATED ORAL at 20:49

## 2025-02-05 RX ADMIN — ACETAMINOPHEN 975 MG: 325 TABLET ORAL at 17:48

## 2025-02-05 RX ADMIN — ASPIRIN 81 MG CHEWABLE TABLET 81 MG: 81 TABLET CHEWABLE at 09:05

## 2025-02-05 RX ADMIN — ACETAMINOPHEN 1000 MG: 10 INJECTION INTRAVENOUS at 06:39

## 2025-02-05 RX ADMIN — FOLIC ACID 1 MG: 1 TABLET ORAL at 09:05

## 2025-02-05 ASSESSMENT — COGNITIVE AND FUNCTIONAL STATUS - GENERAL
DAILY ACTIVITIY SCORE: 23
WALKING IN HOSPITAL ROOM: A LITTLE
STANDING UP FROM CHAIR USING ARMS: A LITTLE
MOVING TO AND FROM BED TO CHAIR: A LITTLE
HELP NEEDED FOR BATHING: A LITTLE
MOBILITY SCORE: 20
CLIMB 3 TO 5 STEPS WITH RAILING: A LITTLE

## 2025-02-05 ASSESSMENT — LIFESTYLE VARIABLES
HEADACHE, FULLNESS IN HEAD: NOT PRESENT
AGITATION: NORMAL ACTIVITY
PAROXYSMAL SWEATS: NO SWEAT VISIBLE
ANXIETY: NO ANXIETY, AT EASE
VISUAL DISTURBANCES: NOT PRESENT
TREMOR: NO TREMOR
NAUSEA AND VOMITING: NO NAUSEA AND NO VOMITING
AUDITORY DISTURBANCES: NOT PRESENT
ANXIETY: NO ANXIETY, AT EASE
AUDITORY DISTURBANCES: NOT PRESENT
VISUAL DISTURBANCES: NOT PRESENT
ORIENTATION AND CLOUDING OF SENSORIUM: ORIENTED AND CAN DO SERIAL ADDITIONS
ORIENTATION AND CLOUDING OF SENSORIUM: ORIENTED AND CAN DO SERIAL ADDITIONS
AGITATION: NORMAL ACTIVITY
TOTAL SCORE: 0
NAUSEA AND VOMITING: NO NAUSEA AND NO VOMITING
PAROXYSMAL SWEATS: NO SWEAT VISIBLE
TREMOR: NO TREMOR
HEADACHE, FULLNESS IN HEAD: NOT PRESENT
TOTAL SCORE: 0

## 2025-02-05 ASSESSMENT — PAIN - FUNCTIONAL ASSESSMENT
PAIN_FUNCTIONAL_ASSESSMENT: 0-10

## 2025-02-05 ASSESSMENT — PAIN SCALES - GENERAL
PAINLEVEL_OUTOF10: 7
PAINLEVEL_OUTOF10: 10 - WORST POSSIBLE PAIN
PAINLEVEL_OUTOF10: 0 - NO PAIN

## 2025-02-05 ASSESSMENT — PAIN DESCRIPTION - ORIENTATION
ORIENTATION: RIGHT;LEFT
ORIENTATION: RIGHT;LEFT

## 2025-02-05 ASSESSMENT — PAIN DESCRIPTION - LOCATION
LOCATION: FOOT
LOCATION: FOOT

## 2025-02-05 NOTE — CARE PLAN
Problem: Safety - Adult  Goal: Free from fall injury  Outcome: Progressing     Problem: Chronic Conditions and Co-morbidities  Goal: Patient's chronic conditions and co-morbidity symptoms are monitored and maintained or improved  Outcome: Progressing  Flowsheets (Taken 2/5/2025 0249)  Care Plan - Patient's Chronic Conditions and Co-Morbidity Symptoms are Monitored and Maintained or Improved: Monitor and assess patient's chronic conditions and comorbid symptoms for stability, deterioration, or improvement     Problem: Fall/Injury  Goal: Not fall by end of shift  Reactivated  Goal: Be free from injury by end of the shift  Reactivated  Goal: Verbalize understanding of personal risk factors for fall in the hospital  Reactivated
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
## 2025-02-05 NOTE — SIGNIFICANT EVENT
Floor Readiness Note       I, personally, evaluated Theron Moran prior to transfer to the floor, including reviewing all current laboratory and imaging studies. The patient remains appropriate for transfer to the floor. Bedside nurse and respiratory therapy are also in agreement of patient's readiness for the floor.     Brief summary:  Theron Moran is a 44 y.o. male who was admitted to the CICU on 2/4 for symptomatic hypoT/ludwig. They have been treated with:  -Glucagon/ca gluconate treating BB dual dosage  -D5 treating hypoglycemia as he was NPO       Updated focused Physical Exam:  Physical Exam  Constitutional: Axox3  HEENT: Normocephalic, atraumatic, PERRLA, EOMI, moist mucous membranes, no pharyngeal erythema  Cardiovascular: RRR, normal S1/S2, no murmurs noted  Pulmonary: Clear to auscultation b/l, no wheezes/crackles/rhonchi, no increased work of breathing, no supplemental oxygen  GI: Soft, non-tender, non-distended, normoactive bowel sounds  : No luna catheter  Lower extremities: Warm and well perfused, no lower extremity edema  Neuro: A&O x3, dysarthria improved, L> R sided weakness  Psych: Appropriate mood and affect      Current Vital Signs:  Heart Rate: 80 (02/05/25 0700 : Shelton Kahn RN)  BP: (!) 157/97 (02/05/25 0700 : Shelton Kahn, RN)  Temp: 36.3 °C (97.3 °F) (02/05/25 1200 : Bradley T Mike)  Resp: 13 (02/05/25 0700 : Shelton Kahn RN)  SpO2: 92 % (02/05/25 0700 : Shelton Kahn RN)    Relevant updates since rounds:  -none    Accepting team, Arti, received verbal sign out and the Provider Care team/Attending has been updated. Bedside nurse will now call accepting nurse for report and patient will be transferred to [facility].    Ricardo Terry MD
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
## 2025-02-05 NOTE — PROGRESS NOTES
Adult SLP Clinical Swallow Evaluation    Patient Name: Theron Moran  MRN: [id number]  Today's Date: 2/5/2025   Time Calculation  Start Time: 0907  Stop Time: 0300  Time Calculation (min): 1073 min       Recommendations:  Solid Diet Recommendations : Regular (IDDSI Level 7)  Liquid Diet Recommendations: Thin (IDDSI Level 0)  Medication Administration Recommendations: Per pt preference  Frequent oral care    Assessment:  Clinical swallow evaluation consult 2/2 pt w/ stroke like symptoms and c/f aspiration previous date. At present pt's symptoms reportedly resolved. Upon eval, pt presenting with intact oral phase, do not suspect pharyngeal dysphagia/aspiration. Recommend pt resume baseline regular texture diet. No further SLP services warranted at this time.     Plan:  SLP Plan: No skilled SLP  No Skilled SLP: No acute SLP goals identified, Independent with swallowing  Discussed POC: Patient, Nursing, Physician  Discussed Risks/Benefits: Yes      Subjective     History and Physical:    Theron Moran is a 44 y.o. male with PMHx traumatic SAH, polysubstance use (cocaine), HTN, DM2, gout, mood disorder presenting with chest pain and uncontrolled HTN. BAT called for confusion and left sided weakness. Exam showed dysarthria, L sided weakness/numbness, which patient reported to be chronic. Given absence of deficits on provider exams in the past, suspect transient worsening of existing stroke i/s/o hypotension. Patient was not a candidate for TNK or MT given suspected chronic vs transient symptoms. Reviewed [facility] images which showed old R BG and CR strokes, as well as intracranial stenosis of L MCA. However, given that these changes are chronic, reasonable to continue current secondary stroke prevention regimen. Exam significantly improved this AM, further supporting recrudescence of old stroke i/s/o low BP.      Relevant SLP Hx:  None available in chart.      Subjective Presentation:  Pt received upright and alert. Oriented  x4. Willing to participate. Breathing on room air. Pt denies hx of dysphagia. On baseline diet of regular solids/thin liquids, currently NPO at time of evaluation.        Objective     Oral/Motor Assessment:  Oral Hygiene: WFL  Dentition: Adequate/Natural  Oral Motor: Impaired Function  Facial Symmetry:  (Subtle left droop, but full facial ROM)  Labial ROM: Within Functional Limits  Lingual ROM: Within Functional Limits  Vocal Quality: Within Functional Limits  Intelligibility: Intelligible (Mild dysarthria present, pt reports baseline since stroke 1 month prior)      Clinical Observations:  Textures Trialed: Thin Liquid ~8oz, Puree x2oz, and Regular Solid x2 george crackers  3oz Water Protocol Utilized: yes, pass    Pt consumed trials w/ adequate extraction, no anterior spillage, timely mastication, and no s/s aspiration. Min coating of george cracker noted on dentition at end of trials.     Inpatient Education:  Pt educated on result and recommendations. Pt indicated understanding.

## 2025-02-05 NOTE — PROCEDURES
Arterial Line Insertion    Date/Time: 2/5/2025 8:16 AM    Performed by: Kathy Osei MD  Authorized by: Magdiel Arcos MD    Consent:     Consent obtained:  Emergent situation    Consent given by:  Patient  Universal protocol:     Procedure explained and questions answered to patient or proxy's satisfaction: yes      Relevant documents present and verified: yes      Test results available: yes      Imaging studies available: yes      Required blood products, implants, devices, and special equipment available: yes      Site/side marked: yes      Immediately prior to procedure, a time out was called: yes      Patient identity confirmed:  Verbally with patient  Indications:     Indications: hemodynamic monitoring and multiple ABGs    Pre-procedure details:     Skin preparation:  Chlorhexidine  Sedation:     Sedation type:  None  Anesthesia:     Anesthesia method:  Local infiltration    Local anesthetic:  Lidocaine 2% w/o epi  Procedure details:     Location:  L radial    Placement technique:  Seldinger and ultrasound guided    Number of attempts:  1    Transducer: waveform confirmed    Post-procedure details:     Post-procedure:  Biopatch applied, secured with tape, sterile dressing applied, sutured and wrist guard applied    Procedure completion:  Tolerated

## 2025-02-05 NOTE — DOCUMENTATION CLARIFICATION NOTE
"    PATIENT:               DAYA SHUKLA  ACCT #:                  [id number]  MRN:                       [id number]  :                       1980  ADMIT DATE:       2025 6:56 AM  DISCH DATE:  RESPONDING PROVIDER #:        [id number]          PROVIDER RESPONSE TEXT:    Type II MI    CDI QUERY TEXT:    Clarification    Instruction:    Based on your assessment of the patient and the clinical information, please provide the requested documentation by clicking on the appropriate radio button and enter any additional information if prompted.    Question: Is there a diagnosis indicative of the patient elevated Troponins and symptoms    When answering this query, please exercise your independent professional judgment. The fact that a question is being asked, does not imply that any particular answer is desired or expected.    The patient's clinical indicators include:  Clinical Information: 45 y/o male presented to ED with chest pain, bilateral foot pain, and hypertension.    Clinical Indicators: ED Provider Note  revealed \"1015 Heparin initiated due to concern for NSTEMI\"    Cardiology H&P  revealed \"#Chest pain  #Troponinemia  #Hypertensive urgency  -Troponins 248 -> 234 -> 207, likely d/t demand ischemia from hypertensive urgency  -EKG negative for ischemic changes  -Utox positive for cocaine despite patient reporting no use for 8-9 days, should not still be positive if that is true -> most likely cause of hypertensive urgency combined with possible medication nonadherence  -Low concern for ACS  Plan:  - Stop heparin gtt  - Continue ASA 81  - Continue home carvedilol 6.25 BID  - Continue home losartan 25 daily  - PRN hydralazine for SBP >180\"    Significant Event Note  revealed \"#Chest pain  #Cocaine  -in the setting of severe blood pressure elevation and positive cocaine UDS (UDS usually negative if not used in the past 5 days)  -likely type 2, he has had a negative stress test 3 months ago  -No new ECG " "changes, Trop 248->234  -continue to monitor, type 2 likely so will defer anticoagulation\"    CICU 2/5 Progress Note revealed \"#Chest pain (on admission)  #Troponinemia  #Hypertensive urgency  :: Troponins 248 -> 234 -> 207, likely d/t demand ischemia from hypertensive urgency  :: EKG negative for ischemic changes  :: Utox positive for cocaine despite patient reporting no use for 8-9 days, should not still be positive if that is true -> most likely cause of hypertensive urgency combined with possible medication nonadherence  :: Low concern for ACS  Plan:  - Was planned for coronary CTA today, will postpone  -Continue ASA 81\"    VS 2/2 at 0646 per flowsheet: T 36.1, , RR 18, /193, SpO2 95% on RA    General Chemistry Labs 2/2-2/4 revealed troponin of 248--234--207--111--85--105--94    EKG 2/2/2025: \"sinus tachycardia, biatrial enlargement    Treatment: Labetalol 10mg IVP 2/2 at 1057. Repeat troponin    Risk Factors: Hypertensive Urgency  Options provided:  -- NSTEMI  -- Type II MI  -- Acute Myocardial Injury  -- Other - I will add my own diagnosis  -- Refer to Clinical Documentation Reviewer    Query created by: Jose Juan Laurent Jr on 2/5/2025 4:34 PM      Electronically signed by:  ESE SELF MD 2/5/2025 4:40 PM          "

## 2025-02-05 NOTE — SIGNIFICANT EVENT
STROKE SIGN OFF  Theron Moran is a 44 y.o. male with PMHx traumatic SAH, polysubstance use (cocaine), HTN, DM2, gout, mood disorder presenting with chest pain and uncontrolled HTN. BAT called for confusion and left sided weakness. Exam showed dysarthria, L sided weakness/numbness, which patient reported to be chronic. Given absence of deficits on provider exams in the past, suspect transient worsening of existing stroke i/s/o hypotension. Patient was not a candidate for TNK or MT given suspected chronic vs transient symptoms. Reviewed [facility] images which showed old R BG and CR strokes, as well as intracranial stenosis of L MCA. However, given that these changes are chronic, reasonable to continue current secondary stroke prevention regimen. Exam significantly improved this AM, further supporting recrudescence of old stroke i/s/o low BP.     RECOMMENDATIONS  - continue ASA, atorvastatin  - avoid hypotension to maintain cerebral perfusion  - management of remaining vascular risk factors (e.g. DM2, smoking, cocaine) per primary team     Patient discussed with attending physician, Dr. Bazzi. Stroke will sign off at this time. Please page [phone] with any questions.    Meryl Osorio  PGY-4 Neurology

## 2025-02-06 LAB
ALBUMIN SERPL BCP-MCNC: 3.8 G/DL (ref 3.4–5)
ANION GAP SERPL CALC-SCNC: 12 MMOL/L (ref 10–20)
BASOPHILS # BLD AUTO: 0.04 X10*3/UL (ref 0–0.1)
BASOPHILS NFR BLD AUTO: 0.3 %
BUN SERPL-MCNC: 27 MG/DL (ref 6–23)
CALCIUM SERPL-MCNC: 9.2 MG/DL (ref 8.6–10.6)
CHLORIDE SERPL-SCNC: 108 MMOL/L (ref 98–107)
CO2 SERPL-SCNC: 23 MMOL/L (ref 21–32)
CREAT SERPL-MCNC: 1.26 MG/DL (ref 0.5–1.3)
EGFRCR SERPLBLD CKD-EPI 2021: 72 ML/MIN/1.73M*2
EOSINOPHIL # BLD AUTO: 0 X10*3/UL (ref 0–0.7)
EOSINOPHIL NFR BLD AUTO: 0 %
ERYTHROCYTE [DISTWIDTH] IN BLOOD BY AUTOMATED COUNT: 14.4 % (ref 11.5–14.5)
GLUCOSE BLD MANUAL STRIP-MCNC: 131 MG/DL (ref 74–99)
GLUCOSE BLD MANUAL STRIP-MCNC: 147 MG/DL (ref 74–99)
GLUCOSE BLD MANUAL STRIP-MCNC: 242 MG/DL (ref 74–99)
GLUCOSE BLD MANUAL STRIP-MCNC: 263 MG/DL (ref 74–99)
GLUCOSE SERPL-MCNC: 119 MG/DL (ref 74–99)
HCT VFR BLD AUTO: 43.5 % (ref 41–52)
HGB BLD-MCNC: 13.9 G/DL (ref 13.5–17.5)
IMM GRANULOCYTES # BLD AUTO: 0.06 X10*3/UL (ref 0–0.7)
IMM GRANULOCYTES NFR BLD AUTO: 0.5 % (ref 0–0.9)
LYMPHOCYTES # BLD AUTO: 0.85 X10*3/UL (ref 1.2–4.8)
LYMPHOCYTES NFR BLD AUTO: 7 %
MAGNESIUM SERPL-MCNC: 1.93 MG/DL (ref 1.6–2.4)
MCH RBC QN AUTO: 26 PG (ref 26–34)
MCHC RBC AUTO-ENTMCNC: 32 G/DL (ref 32–36)
MCV RBC AUTO: 81 FL (ref 80–100)
MONOCYTES # BLD AUTO: 0.6 X10*3/UL (ref 0.1–1)
MONOCYTES NFR BLD AUTO: 5 %
NEUTROPHILS # BLD AUTO: 10.51 X10*3/UL (ref 1.2–7.7)
NEUTROPHILS NFR BLD AUTO: 87.2 %
NRBC BLD-RTO: 0 /100 WBCS (ref 0–0)
PHOSPHATE SERPL-MCNC: 3.2 MG/DL (ref 2.5–4.9)
PLATELET # BLD AUTO: 185 X10*3/UL (ref 150–450)
POTASSIUM SERPL-SCNC: 4.2 MMOL/L (ref 3.5–5.3)
RBC # BLD AUTO: 5.35 X10*6/UL (ref 4.5–5.9)
SODIUM SERPL-SCNC: 139 MMOL/L (ref 136–145)
WBC # BLD AUTO: 12.1 X10*3/UL (ref 4.4–11.3)

## 2025-02-06 PROCEDURE — 2500000001 HC RX 250 WO HCPCS SELF ADMINISTERED DRUGS (ALT 637 FOR MEDICARE OP)

## 2025-02-06 PROCEDURE — 85025 COMPLETE CBC W/AUTO DIFF WBC: CPT

## 2025-02-06 PROCEDURE — 36415 COLL VENOUS BLD VENIPUNCTURE: CPT

## 2025-02-06 PROCEDURE — 99223 1ST HOSP IP/OBS HIGH 75: CPT | Performed by: INTERNAL MEDICINE

## 2025-02-06 PROCEDURE — 1200000002 HC GENERAL ROOM WITH TELEMETRY DAILY

## 2025-02-06 PROCEDURE — 97165 OT EVAL LOW COMPLEX 30 MIN: CPT | Mod: GO

## 2025-02-06 PROCEDURE — 2500000004 HC RX 250 GENERAL PHARMACY W/ HCPCS (ALT 636 FOR OP/ED)

## 2025-02-06 PROCEDURE — 80069 RENAL FUNCTION PANEL: CPT

## 2025-02-06 PROCEDURE — 2500000002 HC RX 250 W HCPCS SELF ADMINISTERED DRUGS (ALT 637 FOR MEDICARE OP, ALT 636 FOR OP/ED)

## 2025-02-06 PROCEDURE — 97161 PT EVAL LOW COMPLEX 20 MIN: CPT | Mod: GP

## 2025-02-06 PROCEDURE — 82947 ASSAY GLUCOSE BLOOD QUANT: CPT

## 2025-02-06 PROCEDURE — 83735 ASSAY OF MAGNESIUM: CPT

## 2025-02-06 RX ORDER — ALLOPURINOL 100 MG/1
100 TABLET ORAL DAILY
Status: DISCONTINUED | OUTPATIENT
Start: 2025-02-06 | End: 2025-02-07 | Stop reason: HOSPADM

## 2025-02-06 RX ORDER — COLCHICINE 0.6 MG/1
0.6 TABLET ORAL DAILY
Status: DISCONTINUED | OUTPATIENT
Start: 2025-02-07 | End: 2025-02-07 | Stop reason: HOSPADM

## 2025-02-06 RX ORDER — PREDNISONE 20 MG/1
20 TABLET ORAL DAILY
Status: DISCONTINUED | OUTPATIENT
Start: 2025-02-10 | End: 2025-02-07 | Stop reason: HOSPADM

## 2025-02-06 RX ORDER — HYDRALAZINE HYDROCHLORIDE 10 MG/1
25 TABLET, FILM COATED ORAL 3 TIMES DAILY PRN
Status: DISCONTINUED | OUTPATIENT
Start: 2025-02-06 | End: 2025-02-07 | Stop reason: HOSPADM

## 2025-02-06 RX ORDER — LOSARTAN POTASSIUM 25 MG/1
25 TABLET ORAL DAILY
Status: DISCONTINUED | OUTPATIENT
Start: 2025-02-06 | End: 2025-02-07

## 2025-02-06 RX ORDER — INSULIN LISPRO 100 [IU]/ML
0-5 INJECTION, SOLUTION INTRAVENOUS; SUBCUTANEOUS
Status: DISCONTINUED | OUTPATIENT
Start: 2025-02-06 | End: 2025-02-07 | Stop reason: HOSPADM

## 2025-02-06 RX ORDER — CARVEDILOL 6.25 MG/1
6.25 TABLET ORAL 2 TIMES DAILY
Status: DISCONTINUED | OUTPATIENT
Start: 2025-02-06 | End: 2025-02-07 | Stop reason: HOSPADM

## 2025-02-06 RX ORDER — PREDNISONE 10 MG/1
10 TABLET ORAL DAILY
Status: DISCONTINUED | OUTPATIENT
Start: 2025-02-12 | End: 2025-02-06

## 2025-02-06 RX ORDER — PREDNISONE 10 MG/1
30 TABLET ORAL DAILY
Status: DISCONTINUED | OUTPATIENT
Start: 2025-02-07 | End: 2025-02-07 | Stop reason: HOSPADM

## 2025-02-06 RX ORDER — PREDNISONE 5 MG/1
5 TABLET ORAL DAILY
Status: DISCONTINUED | OUTPATIENT
Start: 2025-02-16 | End: 2025-02-07 | Stop reason: HOSPADM

## 2025-02-06 RX ORDER — PREDNISONE 5 MG/1
5 TABLET ORAL DAILY
Status: DISCONTINUED | OUTPATIENT
Start: 2025-02-15 | End: 2025-02-06

## 2025-02-06 RX ORDER — PREDNISONE 10 MG/1
10 TABLET ORAL DAILY
Status: DISCONTINUED | OUTPATIENT
Start: 2025-02-13 | End: 2025-02-07 | Stop reason: HOSPADM

## 2025-02-06 RX ORDER — PREDNISONE 20 MG/1
20 TABLET ORAL DAILY
Status: DISCONTINUED | OUTPATIENT
Start: 2025-02-09 | End: 2025-02-06

## 2025-02-06 RX ADMIN — HYDRALAZINE HYDROCHLORIDE 25 MG: 10 TABLET ORAL at 20:07

## 2025-02-06 RX ADMIN — INSULIN LISPRO 2 UNITS: 100 INJECTION, SOLUTION INTRAVENOUS; SUBCUTANEOUS at 16:48

## 2025-02-06 RX ADMIN — THIAMINE HCL TAB 100 MG 100 MG: 100 TAB at 08:20

## 2025-02-06 RX ADMIN — ENOXAPARIN SODIUM 40 MG: 100 INJECTION SUBCUTANEOUS at 20:06

## 2025-02-06 RX ADMIN — CARVEDILOL 6.25 MG: 6.25 TABLET, FILM COATED ORAL at 20:07

## 2025-02-06 RX ADMIN — ATORVASTATIN CALCIUM 80 MG: 80 TABLET, FILM COATED ORAL at 20:06

## 2025-02-06 RX ADMIN — PANTOPRAZOLE SODIUM 40 MG: 40 TABLET, DELAYED RELEASE ORAL at 06:59

## 2025-02-06 RX ADMIN — COLCHICINE 0.6 MG: 0.6 TABLET ORAL at 08:20

## 2025-02-06 RX ADMIN — HYDRALAZINE HYDROCHLORIDE 25 MG: 10 TABLET ORAL at 06:59

## 2025-02-06 RX ADMIN — HYDRALAZINE HYDROCHLORIDE 25 MG: 10 TABLET ORAL at 11:29

## 2025-02-06 RX ADMIN — LOSARTAN POTASSIUM 25 MG: 25 TABLET, FILM COATED ORAL at 11:29

## 2025-02-06 RX ADMIN — CARVEDILOL 6.25 MG: 6.25 TABLET, FILM COATED ORAL at 08:20

## 2025-02-06 RX ADMIN — ACETAMINOPHEN 975 MG: 325 TABLET ORAL at 20:06

## 2025-02-06 RX ADMIN — FOLIC ACID 1 MG: 1 TABLET ORAL at 08:20

## 2025-02-06 RX ADMIN — ALLOPURINOL 100 MG: 100 TABLET ORAL at 14:14

## 2025-02-06 RX ADMIN — PREDNISOLONE 35 MG: 5 TABLET ORAL at 08:20

## 2025-02-06 RX ADMIN — ASPIRIN 81 MG CHEWABLE TABLET 81 MG: 81 TABLET CHEWABLE at 08:20

## 2025-02-06 RX ADMIN — ACETAMINOPHEN 975 MG: 325 TABLET ORAL at 07:03

## 2025-02-06 ASSESSMENT — COGNITIVE AND FUNCTIONAL STATUS - GENERAL
HELP NEEDED FOR BATHING: A LITTLE
DRESSING REGULAR LOWER BODY CLOTHING: A LITTLE
WALKING IN HOSPITAL ROOM: A LITTLE
CLIMB 3 TO 5 STEPS WITH RAILING: A LITTLE
STANDING UP FROM CHAIR USING ARMS: A LITTLE
MOBILITY SCORE: 19
DAILY ACTIVITIY SCORE: 22
HELP NEEDED FOR BATHING: A LITTLE
STANDING UP FROM CHAIR USING ARMS: A LITTLE
MOVING TO AND FROM BED TO CHAIR: A LITTLE
DAILY ACTIVITIY SCORE: 23
MOVING TO AND FROM BED TO CHAIR: A LITTLE
MOBILITY SCORE: 20
STANDING UP FROM CHAIR USING ARMS: A LITTLE
DAILY ACTIVITIY SCORE: 23
CLIMB 3 TO 5 STEPS WITH RAILING: A LITTLE
WALKING IN HOSPITAL ROOM: A LITTLE
MOBILITY SCORE: 20
TURNING FROM BACK TO SIDE WHILE IN FLAT BAD: A LITTLE
WALKING IN HOSPITAL ROOM: A LITTLE
CLIMB 3 TO 5 STEPS WITH RAILING: A LITTLE
MOVING TO AND FROM BED TO CHAIR: A LITTLE
HELP NEEDED FOR BATHING: A LITTLE

## 2025-02-06 ASSESSMENT — ACTIVITIES OF DAILY LIVING (ADL)
ADL_ASSISTANCE: INDEPENDENT
ADL_ASSISTANCE: INDEPENDENT

## 2025-02-06 ASSESSMENT — PAIN SCALES - GENERAL
PAINLEVEL_OUTOF10: 2
PAINLEVEL_OUTOF10: 0 - NO PAIN
PAINLEVEL_OUTOF10: 6
PAINLEVEL_OUTOF10: 0 - NO PAIN

## 2025-02-06 ASSESSMENT — PAIN - FUNCTIONAL ASSESSMENT
PAIN_FUNCTIONAL_ASSESSMENT: 0-10
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
## 2025-02-06 NOTE — PROGRESS NOTES
Physical Therapy    Physical Therapy Evaluation    Patient Name: Theron Moran  MRN: [id number]  Department: [place] [address]  Room: [facility]  Today's Date: 2/6/2025   Time Calculation  Start Time: 0859  Stop Time: 0910  Time Calculation (min): 11 min    Assessment/Plan   PT Assessment  PT Assessment Results: Decreased strength, Decreased range of motion, Decreased endurance, Decreased mobility, Impaired balance, Decreased safety awareness, Pain  Rehab Prognosis: Good  Barriers to Discharge Home: No anticipated barriers  Evaluation/Treatment Tolerance: Patient limited by pain  Medical Staff Made Aware: Yes  Strengths: Attitude of self  Barriers to Participation: Comorbidities  End of Session Communication: Bedside nurse  Assessment Comment: pt benefits from continued PT at low intensity to address deficits in strength balance and mobility  End of Session Patient Position: Bed, 3 rail up, Alarm on  IP OR SWING BED PT PLAN  Inpatient or Swing Bed: Inpatient  PT Plan  Treatment/Interventions: Bed mobility, Transfer training, Gait training, Stair training, Balance training, Range of motion, Endurance training, Strengthening, Therapeutic exercise, Therapeutic activity, Home exercise program, Positioning  PT Plan: Ongoing PT  PT Frequency: 2 times per week  PT Discharge Recommendations: Low intensity level of continued care  Equipment Recommended upon Discharge: Wheeled walker  PT Recommended Transfer Status: Contact guard, Assistive device  PT - OK to Discharge: Yes (DC rec made)    Subjective   General Visit Information:  General  Reason for Referral: Resistant hypertension.  Past Medical History Relevant to Rehab: polysubstance use disorder, subarachnoid hemorrhage, mood disorder, HTN, diabetes, gout, SI and HI, and hypertensive emergency with flash pulmonary edema who was admitted under the care of cardiology service on 02/02 for hypertensive urgency  Family/Caregiver Present: No  Co-Treatment: OT  Co-Treatment Reason: to  maximize safe pt mobility and participation; pt has hx of SI and HI  Prior to Session Communication: Bedside nurse  Patient Position Received: Bed, 3 rail up, Alarm on  General Comment: pt supine alert and agreeable for PT. trialed completed Tinetti balance assessment, pt unable to balance without UE support.  Home Living:  Home Living  Type of Home: House  Lives With: Spouse  Home Adaptive Equipment: None  Home Layout: One level  Home Access: Level entry  Bathroom Shower/Tub: Tub/shower unit  Bathroom Toilet: Standard  Bathroom Equipment: Grab bars in shower, Shower chair with back  Prior Level of Function:  Prior Function Per Pt/Caregiver Report  Level of [place]: Independent with ADLs and functional transfers, Independent with homemaking with ambulation  ADL Assistance: Independent  Homemaking Assistance: Independent  Ambulatory Assistance: Independent  Vocational: Unemployed  Prior Function Comments: - falls; - drive  Precautions:  Precautions  Medical Precautions: Fall precautions          Objective   Pain:  Pain Assessment  Pain Assessment: 0-10  0-10 (Numeric) Pain Score: 0 - No pain  Cognition:  Cognition  Orientation Level: Oriented X4    General Assessments:                  Activity Tolerance  Endurance: Tolerates 10 - 20 min exercise with multiple rests    Sensation  Light Touch:  (numbness B feet)            Perception  Inattention/Neglect: Appears intact  Initiation: Appears intact  Motor Planning: Appears intact  Perseveration: Not present      Coordination  Movements are Fluid and Coordinated: Yes    Postural Control  Postural Control: Within Functional Limits    Static Sitting Balance  Static Sitting-Balance Support: No upper extremity supported, Feet supported  Static Sitting-Level of Assistance: Close supervision  Dynamic Sitting Balance  Dynamic Sitting-Balance Support: No upper extremity supported, Feet supported  Dynamic Sitting-Level of Assistance: Contact guard  Dynamic Sitting-Balance:  Trunk control activities    Static Standing Balance  Static Standing-Balance Support: Bilateral upper extremity supported  Static Standing-Level of Assistance: Contact guard  Dynamic Standing Balance  Dynamic Standing-Balance Support: Bilateral upper extremity supported  Dynamic Standing-Level of Assistance: Contact guard  Dynamic Standing-Balance: Turning  Functional Assessments:  Bed Mobility  Bed Mobility: Yes  Bed Mobility 1  Bed Mobility 1: Supine to sitting, Sitting to supine  Level of Assistance 1: Contact guard  Bed Mobility Comments 1: HOB elevated    Transfers  Transfer: Yes  Transfer 1  Transfer From 1: Sit to, Stand to  Transfer to 1: Sit, Stand  Technique 1: Sit to stand, Stand to sit  Transfer Device 1: Walker  Transfer Level of Assistance 1: Contact guard, Minimal verbal cues  Trials/Comments 1: cues for safety; x2    Ambulation/Gait Training  Ambulation/Gait Training Performed: Yes  Ambulation/Gait Training 1  Surface 1: Level tile  Device 1: Rolling walker  Assistance 1: Contact guard, Minimal verbal cues  Quality of Gait 1: Wide base of support, Inconsistent stride length, Decreased step length, Forward flexed posture, Antalgic  Comments/Distance (ft) 1: ~20ft; VC for safety    Stairs  Stairs: No  Extremity/Trunk Assessments:  RLE   RLE : Within Functional Limits (pain and edema at foot)  LLE   LLE : Within Functional Limits (pain and edema at foot)  Outcome Measures:  [facility] Basic Mobility  Turning from your back to your side while in a flat bed without using bedrails: None  Moving from lying on your back to sitting on the side of a flat bed without using bedrails: A little  Moving to and from bed to chair (including a wheelchair): A little  Standing up from a chair using your arms (e.g. wheelchair or bedside chair): A little  To walk in hospital room: A little  Climbing 3-5 steps with railing: A little  Basic Mobility - Total Score: 19    Encounter Problems       Encounter Problems (Active)        Balance       pt will achieve >/=24/28 on Tinetti Assessment.        Start:  02/06/25    Expected End:  02/20/25               Mobility       STG - Patient will ambulate >/= 250ft SBA and LRAD       Start:  02/06/25    Expected End:  02/20/25            STG - Patient will ascend and descend four to six stairs SBA       Start:  02/06/25    Expected End:  02/20/25               PT Transfers       STG - Transfer from bed to chair SBA and LRAD       Start:  02/06/25    Expected End:  02/20/25            STG - Patient will perform bed mobility SBA       Start:  02/06/25    Expected End:  02/20/25            STG - Patient will transfer sit to and from stand SBA and LRAD       Start:  02/06/25    Expected End:  02/20/25               Pain - Adult              Education Documentation  Precautions, taught by Marilyn Rodriguez PT at 2/6/2025 12:26 PM.  Learner: Patient  Readiness: Acceptance  Method: Explanation  Response: Verbalizes Understanding    Mobility Training, taught by Marilyn Rodriguez PT at 2/6/2025 12:26 PM.  Learner: Patient  Readiness: Acceptance  Method: Explanation  Response: Verbalizes Understanding    Education Comments  No comments found.        02/06/25 at 12:27 PM - Marilyn Rodriguez PT

## 2025-02-06 NOTE — CARE PLAN
Transitional Care Coordinator Note: Patient discussed with medical team, per medical team patient is not medically ready. [facility] today. Discharge dispo: [facility]. ADOD 2-3 Days.  Kareem Rinaldi RN  Transitional Care Coordinator

## 2025-02-06 NOTE — CARE PLAN
The patient's goals for the shift include      The clinical goals for the shift include Pt systolic will remain <180

## 2025-02-06 NOTE — CONSULTS
Rheumatology New Consult Note      Patient Theron Moran PCP Nilson Dobson MD    MRN [id number]  Admission Date 2/2/2025      Chief Complaint/Reason for Consult:  Gout flare    Subjective    Subjective   History of Presenting Illness  Mr. Theron Moran is a 44 y.o. male with a past medical history of polysubstance use disorder, subarachnoid hemorrhage, mood disorder with SI and HI, HTN, DM, gout, and hypertensive emergency with flash pulmonary edema who was initially admitted to CICU on 2/4/25 for hypotension/lactic acidosis likely d/t dual BB intake. Patient transferred out of CICU 2/5/25. Rheumatology consulted for possible gout flare.    Regarding gout history, the patient reports he had his first flare 4-5 years ago. He was diagnosed with gout during an ED visit and takes colchicine daily. His gout flares begin in his 1st MTPs, and often becomes bilateral. Has never had any other joints affected. Recently, he reports he has been having one flare/month. He feels that he is having a current flare of his gout, ongoing for about 2 weeks. Symptoms started in his bilateral MTPs (sudden onset pain, numbness, tingling), which then progressed to all of his toes. No other joints are affected. He has taken colchicine without much relief.     Gout risk factors:  Alcohol use - 3 cans of beer/day  Red meat - rare  Shellfish - 2-3x/week  Sugar sweetened beverages - daily    ROS:  10 point ROS negative except as per HPI    History reviewed. No pertinent past medical history.     No Known Allergies     Objective   Objective     Patient Vitals for the past 24 hrs:   BP Temp Temp src Pulse Resp SpO2   02/06/25 0931 (!) 176/110 36.6 °C (97.9 °F) -- 85 -- 96 %   02/06/25 0806 (!) 184/120 36.4 °C (97.5 °F) -- -- -- 100 %   02/06/25 0530 (!) 186/125 36.3 °C (97.3 °F) -- 86 19 97 %   02/05/25 2343 (!) 146/93 36.2 °C (97.2 °F) Temporal 82 14 94 %   02/05/25 2033 (!) 163/111 -- -- -- -- --   02/05/25 2030 (!) 160/113 36.4 °C (97.5 °F)  Temporal 86 16 97 %   02/05/25 1450 (!) 149/108 36.9 °C (98.4 °F) -- 87 20 98 %   02/05/25 1200 -- 36.3 °C (97.3 °F) Temporal -- -- --        PHYSICAL EXAM:  General - NAD, pleasant, AAOx3  Head: Normocephalic, atraumatic  Eyes - PERRLA, EOMI. No conjunctiva injection.   Mouth/ENT - Moist oral and nasal mucosa. No facial rash. No enlarged parotid or submandibular gland. Adequate salivary pooling.  Cardiovascular - Regular rate and rhythm.   Lungs - No respiratory distress. On room air.  Skin - Hyperpigmentation near bilateral 1st MTPs, right 5th toe. No tophi. No rashes or ulcers. No erythema on bilateral cheeks.  Abdomen - Soft, non-tender. No masses.   Extremities - No edema, cyanosis ,or clubbing  Neurological - Alert and oriented x 3,  grossly intact. No focal deficit.    Musculoskeletal  Shoulders: Full ROM, without pain, no swelling, warmth or tenderness.  Elbows: Full ROM, without pain, no swelling, warmth or tenderness.  Wrists: Full ROM, without pain, no swelling, warmth or tenderness.  MCP: No swelling, warmth or tenderness. Metacarpal squeeze negative  PIP: No swelling, warmth or tenderness.  DIP: No swelling, warmth or tenderness.  Hips: Full ROM.  No malalignment.  Knees:  Mild effusions of bilateral knees. Full ROM, without pain, no swelling, warmth or point tenderness. No joint line tenderness, no pes anserine tenderness.  Ankles: Full ROM, without pain, swelling, warmth or tenderness.  Toes: No swelling, warmth or tenderness. Metatarsal squeeze negative  Cervical spine: No tenderness or limitation of movement  Lumbar spine: No tenderness or limitation of movement     LABS:  Results from last 7 days   Lab Units 02/06/25  0802 02/05/25  0521 02/04/25  1245 02/04/25  0831 02/03/25  1653   WBC AUTO x10*3/uL 12.1* 13.1* 9.5 9.6 10.3   RBC AUTO x10*6/uL 5.35 5.47 5.21 5.43 5.30   HEMOGLOBIN g/dL 13.9 14.3 13.6 14.2 13.8   MCV fL 81 80 78* 82 81     Results from last 7 days   Lab Units 02/05/25  0580  "02/04/25  1627 02/04/25  1245 02/04/25  0831 02/03/25  1653   SODIUM mmol/L 139 136 138 140 144   POTASSIUM mmol/L 4.3 4.4 4.3 4.2 3.6   CHLORIDE mmol/L 111* 110* 113* 113* 112*   CO2 mmol/L 19* 19* 18* 19* 23   BUN mg/dL 34* 33* 33* 31* 27*   CREATININE mg/dL 1.41* 1.52* 1.57* 1.44* 1.44*   EGFR mL/min/1.73m*2 63 58* 55* 61 61   CALCIUM mg/dL 8.9 8.1* 9.1 8.6 8.7   PHOSPHORUS mg/dL 4.5 4.9 3.6 4.1 3.8   MAGNESIUM mg/dL 1.80  --  2.04 2.10 1.78   ALBUMIN g/dL 3.2* 3.1* 3.3* 3.6 3.4     Lab Results   Component Value Date    CRP 0.76 02/02/2025    CRP 1.68 (H) 01/26/2025    CRP 4.45 (H) 03/12/2024     Lab Results   Component Value Date    SEDRATE 15 02/02/2025    SEDRATE 20 (H) 01/26/2025    SEDRATE 44 (H) 03/12/2024     No results found for: \"C3\", \"C4\"  Lab Results   Component Value Date    RF <10 01/26/2025    CITAB <1 01/27/2025     No results found for: \"ANATITER\", \"ARNP\", \"ASMRN\", \"ASSA\", \"ASSB\", \"ASCL\", \"JO1\", \"ACHR\", \"ACEN\", \"RIBPP\", \"DNADS\", \"ANCPA\", \"ANCTI\", \"PR3\", \"MPO\"  Lab Results   Component Value Date    URICACID 8.0 (H) 02/02/2025       No results found for: \"COLORFL\", \"CLARITYFLUID\", \"WBCFL\", \"NEUTROBFREL\", \"LYMPHSBFREL\", \"EOSBFREL\", \"BASOBFREL\", \"PLASMACFLD\", \"RBCFL\", \"CRYSFL\"    IMAGING:  === 02/02/25 ===    XR CHEST 1 VIEW    - Impression -  1. Stable cardiomegaly and mild pulmonary edema    I personally reviewed the images/study and I agree with the findings  as stated by Jocelin Villarreal MD, PGY-2 this study was interpreted at  [facility], [place], [place].    MACRO:  None    Signed by: Aditi Higginbotham 2/4/2025 11:20 AM  Dictation workstation:   [id number]   === 02/02/25 ===    MR TRANSFER OF OUTSIDE FILMS      CT transfer of outside films    Result Date: 2/4/2025  Outside images for comparison or treatment purposes, not interpreted by  Radiologists.    MR transfer of outside films    Result Date: 2/4/2025  Outside images for comparison or treatment purposes, " not interpreted by  Radiologists.    Electrocardiogram, 12-lead PRN ACS symptoms    Result Date: 2/4/2025  Normal sinus rhythm Right atrial enlargement Anteroseptal infarct , age undetermined Abnormal ECG When compared with ECG of 04-FEB-2025 09:27, Anteroseptal infarct is now Present T wave inversion now evident in Anterior leads Confirmed by Jame Barnes ([id number]) on 2/4/2025 3:20:56 PM    XR chest 1 view    Result Date: 2/4/2025  Interpreted By:  Aditi Malloy,  and Cherelle Monreal STUDY: XR CHEST 1 VIEW;  2/4/2025 10:52 am   INDICATION: Signs/Symptoms:dyspnea.     COMPARISON: Chest x-ray from 02/02/2025   ACCESSION NUMBER(S): [id number]   ORDERING CLINICIAN: MENDOZA TORIBIO   FINDINGS: AP radiograph of the chest was provided.     CARDIOMEDIASTINAL SILHOUETTE: Cardiomediastinal silhouette is stable in size and configuration, enlarged.   LUNGS: There is no pneumothorax. There is mild interstitial prominence.   ABDOMEN: No remarkable upper abdominal findings.   BONES: No acute osseous changes.       1. Stable cardiomegaly and mild pulmonary edema   I personally reviewed the images/study and I agree with the findings as stated by Jocelin Villarreal MD, PGY-2 this study was interpreted at [facility], [place], [place].   MACRO: None   Signed by: Aditi Higginbotham 2/4/2025 11:20 AM Dictation workstation:   [id number]      Estimated Creatinine Clearance: 72.2 mL/min (A) (by C-G formula based on SCr of 1.41 mg/dL (H)).     Assessment    Assessment & Plan   Mr. Theron Moran is a 44 y.o. male with a past medical history of polysubstance use disorder, subarachnoid hemorrhage, mood disorder with SI and HI, HTN, DM, gout, and hypertensive emergency with flash pulmonary edema who was initially admitted to CICU on 2/4/25 for hypotension/lactic acidosis likely d/t dual BB intake. Patient transferred out of CICU 2/5/25. Rheumatology consulted for possible gout flare.    Pertinent  work-up:  2/2025 - Uric acid 8.0  1/2025 - Uric acid 6.3  1/26/25 bilateral foot X-rays - mild degenerative 1st MTPs and IP joints, associated tiny osseous erosions, suggestive of inflammatory arthropathy    Patient presenting with history consistent with gout flare, multiple gout risk factors, exam consistent with gout, imaging consistent with gout, and also has previously noted hyperuricemia on 2 occasions. Gout clinical diagnostic rule as below also shows high probability of gout.    Male sex (2 points)  Previous patient-reported arthritis flare (2 points)  Onset within one day (0.5 points)  Joint redness (1 point)  First metatarsal phalangeal joint involvement (2.5 points)  Hypertension or at least one cardiovascular disease (1.5 points)  Serum urate level greater than 5.88 mg/dL (3.5 points)  Total for patient = 13 points (>8 points = high probability of gout)    Treatment for acute gout flare is indicated at this time. Given that patient has had >2 gout flares in the year, patient would also benefit urate-lowering therapy. Uric acid target <6.0 as patient has non-tophaceous gout.    Acute flare of non-tophaceous polyarticular gout, not crystal proven, affecting the bilateral feet/toes    Recommendations:  Start prednisone taper- 30 mg prednisone x3 days, 20 mg x3 days, 10 mg x3 days, 5 mg x3 days for acute flare treatment  Colchicine 0.6 mg every day (reduce from BID) for flare prophylaxis  Start allopurinol 100 mg every day  Obtain HLA-B58 level  Plan to uptitrate allopurinol as an outpatient (goal uric acid level <6.0)  Patient was counseled about low purine diet  Patient to follow-up with outpatient rheumatology after discharge. Patient has appointment scheduled for Monday, 2/17/25 at 8:00 AM at [facility] ([facility] [address]). Please include on discharge paperwork.    The rest per the primary team.    Thank you for this interesting consult. Will continue to follow.      Independently reviewed  three or more labs  Images including CXR, CT, MRI independently reviewed.   Monitoring for drug toxicity   Discussed with patient and primary team     Patient seen and discussed with Dr. Singh.    Dinorah Mcconnell MD  Rheumatology Fellow, PGY-4

## 2025-02-06 NOTE — PROGRESS NOTES
Occupational Therapy    Evaluation    Patient Name: Theron Moran  MRN: [id number]  Today's Date: 2/6/2025  Time Calculation  Start Time: 0900  Stop Time: 0911  Time Calculation (min): 11 min    Assessment  IP OT Assessment  OT Assessment: Pt completed functional bed mobility with SBA with HOB elevated, sit to stand with CGA, anticipate set-up with lower body ADLs. pt was limited with functional ambulation due to current pain level B distal LE.  Prognosis: Good  Barriers to Discharge Home: No anticipated barriers  Evaluation/Treatment Tolerance: Patient limited by pain  Medical Staff Made Aware: Yes  End of Session Communication: Bedside nurse  End of Session Patient Position: Bed, 3 rail up, Alarm on  Plan:  Treatment Interventions: ADL retraining, Functional transfer training, Endurance training  No Skilled OT: At baseline function  OT Frequency: OT eval only  OT Discharge Recommendations: No further acute OT  OT Recommended Transfer Status:  (SBA)  OT - OK to Discharge: Yes    Subjective   Current Problem:  1. Hypotension  Insert arterial line    Arterial Line Insertion    Referral to Primary Care      2. Resistant hypertension        3. Chest pain, unspecified type        4. 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 NSTEMI (non-ST elevated myocardial infarction) (Multi)          General:  Reason for Referral: Resistant hypertension.  Past Medical History Relevant to Rehab: polysubstance use disorder, subarachnoid hemorrhage, mood disorder, HTN, diabetes, gout, SI and HI, and hypertensive emergency with flash pulmonary edema who was admitted under the care of cardiology service on 02/02 for hypertensive urgency  Co-Treatment: PT  Co-Treatment Reason: to maximize safe pt mobility and participation; pt has hx of SI and HI  Prior to Session Communication: Bedside nurse  Patient Position Received: Bed, 3 rail up, Alarm on  Family/Caregiver Present: No    Precautions:  Medical Precautions: Fall precautions    Pain:  Pain Assessment  Pain Assessment: 0-10  0-10 (Numeric) Pain Score:  (complain of B distal foot pain due to gout. RN is aware. pain not rated. initially reported R distal LE pain greater than the left, however at the end of the session pt reported increase pain in L LE more than the right side.)        Objective   Cognition:  Overall Cognitive Status: Within Functional Limits  Orientation Level: Oriented X4           Home Living:  Type of Home: House  Lives With: Spouse  Home Adaptive Equipment: None  Home Layout: One level  Home Access: Level entry  Bathroom Shower/Tub: Tub/shower unit  Bathroom Toilet: Standard  Bathroom Equipment: Grab bars in shower, Shower chair with back   Prior Function:  Level of [place]: Independent with ADLs and functional transfers, Independent with homemaking with ambulation  ADL Assistance: Independent  Homemaking Assistance: Independent  Ambulatory Assistance: Independent  Vocational: Unemployed  Prior Function Comments: - falls; - drive    ADL:  Eating Assistance: Independent  Grooming Assistance: Independent  Grooming Deficit:  (anticipate)  LE Dressing Assistance: Stand by  LE Dressing Deficit: Thread RLE into pants, Thread LLE into pants (anticipate)  Activity Tolerance:  Endurance: Decreased tolerance for upright activites (due to current pain level)  Balance:  Dynamic Standing Balance  Dynamic Standing-Balance Support: Bilateral upper extremity supported  Dynamic Standing-Level of Assistance: Contact guard  Dynamic Standing-Balance:  (pt was able to complete short distance ambulation with CGA using a wheeled walker)  Bed Mobility/Transfers: Bed Mobility  Bed Mobility: Yes  Bed Mobility 1  Bed Mobility 1: Supine to sitting, Sitting to supine  Level of Assistance 1: Contact guard  Bed Mobility Comments 1: HOB elevated   and Transfers  Transfer: Yes  Transfer 1  Transfer From 1: Bed to  Transfer to 1:  Stand  Technique 1: Sit to stand  Transfer Device 1: Walker  Transfer Level of Assistance 1: Contact guard, Minimal verbal cues  Transfers 2  Transfer From 2: Stand to  Transfer to 2: Chair with arms  Technique 2: Stand to sit  Transfer Device 2: Walker  Transfer Level of Assistance 2: Contact guard, Minimal verbal cues  Transfers 3  Transfer From 3: Chair with arms to  Transfer to 3: Stand  Technique 3: Sit to stand  Transfer Device 3: Walker  Transfer Level of Assistance 3: Contact guard, Minimal verbal cues    Vision:     and Vision - Complex Assessment  Ocular Range of Motion: Within Functional Limits  Sensation:  Light Touch: No apparent deficits    Coordination:  Movements are Fluid and Coordinated: Yes   Hand Function:  Hand Function  Gross Grasp: Functional  Coordination: Functional  Extremities: RUE   RUE : Within Functional Limits, LUE   LUE: Within Functional Limits,      Outcome Measures: [facility] Daily Activity  Putting on and taking off regular lower body clothing: A little  Bathing (including washing, rinsing, drying): A little  Putting on and taking off regular upper body clothing: None  Toileting, which includes using toilet, bedpan or urinal: None  Taking care of personal grooming such as brushing teeth: None  Eating Meals: None  Daily Activity - Total Score: 22         ,     OT Adult Other Outcome Measures  4AT: negative    Education Documentation  Body Mechanics, taught by Priscila Pineda OT at 2/6/2025  2:11 PM.  Learner: Patient  Readiness: Acceptance  Method: Explanation  Response: Verbalizes Understanding    ADL Training, taught by Priscila Pineda OT at 2/6/2025  2:11 PM.  Learner: Patient  Readiness: Acceptance  Method: Explanation  Response: Verbalizes Understanding    Education Comments  No comments found.        02/06/25 at 2:12 PM   Priscila Pineda OTR/OTD  Rehab Office: [phone]

## 2025-02-07 ENCOUNTER — APPOINTMENT (OUTPATIENT)
Dept: RADIOLOGY | Facility: HOSPITAL | Age: 45
End: 2025-02-07
Payer: COMMERCIAL

## 2025-02-07 VITALS
HEART RATE: 90 BPM | WEIGHT: 199.08 LBS | RESPIRATION RATE: 18 BRPM | TEMPERATURE: 97.7 F | BODY MASS INDEX: 31.99 KG/M2 | DIASTOLIC BLOOD PRESSURE: 132 MMHG | SYSTOLIC BLOOD PRESSURE: 169 MMHG | HEIGHT: 66 IN | OXYGEN SATURATION: 98 %

## 2025-02-07 LAB
ALBUMIN SERPL BCP-MCNC: 3.5 G/DL (ref 3.4–5)
ANION GAP SERPL CALC-SCNC: 14 MMOL/L (ref 10–20)
BASOPHILS # BLD AUTO: 0.06 X10*3/UL (ref 0–0.1)
BASOPHILS NFR BLD AUTO: 0.4 %
BUN SERPL-MCNC: 24 MG/DL (ref 6–23)
CALCIUM SERPL-MCNC: 8.8 MG/DL (ref 8.6–10.6)
CHLORIDE SERPL-SCNC: 108 MMOL/L (ref 98–107)
CO2 SERPL-SCNC: 26 MMOL/L (ref 21–32)
CREAT SERPL-MCNC: 1.18 MG/DL (ref 0.5–1.3)
EGFRCR SERPLBLD CKD-EPI 2021: 78 ML/MIN/1.73M*2
EOSINOPHIL # BLD AUTO: 0.2 X10*3/UL (ref 0–0.7)
EOSINOPHIL NFR BLD AUTO: 1.4 %
ERYTHROCYTE [DISTWIDTH] IN BLOOD BY AUTOMATED COUNT: 14.3 % (ref 11.5–14.5)
GLUCOSE BLD MANUAL STRIP-MCNC: 128 MG/DL (ref 74–99)
GLUCOSE BLD MANUAL STRIP-MCNC: 95 MG/DL (ref 74–99)
GLUCOSE SERPL-MCNC: 98 MG/DL (ref 74–99)
HCT VFR BLD AUTO: 41.2 % (ref 41–52)
HGB BLD-MCNC: 13.2 G/DL (ref 13.5–17.5)
IMM GRANULOCYTES # BLD AUTO: 0.04 X10*3/UL (ref 0–0.7)
IMM GRANULOCYTES NFR BLD AUTO: 0.3 % (ref 0–0.9)
LYMPHOCYTES # BLD AUTO: 1.98 X10*3/UL (ref 1.2–4.8)
LYMPHOCYTES NFR BLD AUTO: 14.2 %
MAGNESIUM SERPL-MCNC: 1.84 MG/DL (ref 1.6–2.4)
MCH RBC QN AUTO: 26.3 PG (ref 26–34)
MCHC RBC AUTO-ENTMCNC: 32 G/DL (ref 32–36)
MCV RBC AUTO: 82 FL (ref 80–100)
MONOCYTES # BLD AUTO: 1.26 X10*3/UL (ref 0.1–1)
MONOCYTES NFR BLD AUTO: 9 %
NEUTROPHILS # BLD AUTO: 10.43 X10*3/UL (ref 1.2–7.7)
NEUTROPHILS NFR BLD AUTO: 74.7 %
NRBC BLD-RTO: 0 /100 WBCS (ref 0–0)
PHOSPHATE SERPL-MCNC: 3.5 MG/DL (ref 2.5–4.9)
PLATELET # BLD AUTO: 151 X10*3/UL (ref 150–450)
POTASSIUM SERPL-SCNC: 3.7 MMOL/L (ref 3.5–5.3)
RBC # BLD AUTO: 5.02 X10*6/UL (ref 4.5–5.9)
SODIUM SERPL-SCNC: 144 MMOL/L (ref 136–145)
WBC # BLD AUTO: 14 X10*3/UL (ref 4.4–11.3)

## 2025-02-07 PROCEDURE — A9575 INJ GADOTERATE MEGLUMI 0.1ML: HCPCS

## 2025-02-07 PROCEDURE — 84100 ASSAY OF PHOSPHORUS: CPT

## 2025-02-07 PROCEDURE — 99233 SBSQ HOSP IP/OBS HIGH 50: CPT | Performed by: INTERNAL MEDICINE

## 2025-02-07 PROCEDURE — 2500000001 HC RX 250 WO HCPCS SELF ADMINISTERED DRUGS (ALT 637 FOR MEDICARE OP)

## 2025-02-07 PROCEDURE — 83735 ASSAY OF MAGNESIUM: CPT

## 2025-02-07 PROCEDURE — 82947 ASSAY GLUCOSE BLOOD QUANT: CPT

## 2025-02-07 PROCEDURE — 2500000004 HC RX 250 GENERAL PHARMACY W/ HCPCS (ALT 636 FOR OP/ED)

## 2025-02-07 PROCEDURE — 81381 HLA I TYPING 1 ALLELE HR: CPT

## 2025-02-07 PROCEDURE — 80069 RENAL FUNCTION PANEL: CPT

## 2025-02-07 PROCEDURE — 85025 COMPLETE CBC W/AUTO DIFF WBC: CPT

## 2025-02-07 PROCEDURE — 75563 CARD MRI W/STRESS IMG & DYE: CPT

## 2025-02-07 PROCEDURE — 2550000001 HC RX 255 CONTRASTS

## 2025-02-07 PROCEDURE — 75563 CARD MRI W/STRESS IMG & DYE: CPT | Performed by: RADIOLOGY

## 2025-02-07 PROCEDURE — 36415 COLL VENOUS BLD VENIPUNCTURE: CPT

## 2025-02-07 RX ORDER — ALLOPURINOL 100 MG/1
100 TABLET ORAL DAILY
Qty: 30 TABLET | Refills: 0 | Status: SHIPPED | OUTPATIENT
Start: 2025-02-08 | End: 2025-03-10

## 2025-02-07 RX ORDER — EPLERENONE 25 MG/1
25 TABLET, FILM COATED ORAL DAILY
Status: DISCONTINUED | OUTPATIENT
Start: 2025-02-07 | End: 2025-02-07 | Stop reason: HOSPADM

## 2025-02-07 RX ORDER — NAPROXEN SODIUM 220 MG/1
81 TABLET, FILM COATED ORAL DAILY
Qty: 30 TABLET | Refills: 0 | Status: SHIPPED | OUTPATIENT
Start: 2025-02-08 | End: 2025-03-10

## 2025-02-07 RX ORDER — CARVEDILOL 6.25 MG/1
6.25 TABLET ORAL 2 TIMES DAILY
Qty: 60 TABLET | Refills: 0 | Status: SHIPPED | OUTPATIENT
Start: 2025-02-07 | End: 2025-03-09

## 2025-02-07 RX ORDER — PREDNISONE 5 MG/1
TABLET ORAL
Qty: 33 TABLET | Refills: 0 | Status: SHIPPED | OUTPATIENT
Start: 2025-02-08 | End: 2025-02-19

## 2025-02-07 RX ORDER — LOSARTAN POTASSIUM 50 MG/1
50 TABLET ORAL DAILY
Qty: 30 TABLET | Refills: 0 | Status: SHIPPED | OUTPATIENT
Start: 2025-02-08 | End: 2025-03-10

## 2025-02-07 RX ORDER — NAPROXEN SODIUM 220 MG/1
81 TABLET, FILM COATED ORAL DAILY
Qty: 30 TABLET | Refills: 0 | OUTPATIENT
Start: 2025-02-07 | End: 2025-03-09

## 2025-02-07 RX ORDER — COLCHICINE 0.6 MG/1
0.6 TABLET ORAL DAILY
Qty: 30 TABLET | Refills: 0 | Status: SHIPPED | OUTPATIENT
Start: 2025-02-08 | End: 2025-03-10

## 2025-02-07 RX ORDER — EPLERENONE 25 MG/1
25 TABLET, FILM COATED ORAL DAILY
Qty: 30 TABLET | Refills: 0 | Status: SHIPPED | OUTPATIENT
Start: 2025-02-07 | End: 2025-03-09

## 2025-02-07 RX ORDER — ATORVASTATIN CALCIUM 80 MG/1
80 TABLET, FILM COATED ORAL NIGHTLY
Qty: 30 TABLET | Refills: 0 | Status: SHIPPED | OUTPATIENT
Start: 2025-02-07 | End: 2025-03-09

## 2025-02-07 RX ORDER — PREDNISONE 5 MG/1
TABLET ORAL
Qty: 39 TABLET | Refills: 0 | OUTPATIENT
Start: 2025-02-07 | End: 2025-02-19

## 2025-02-07 RX ORDER — FOLIC ACID 1 MG/1
1 TABLET ORAL DAILY
Qty: 30 TABLET | Refills: 0 | Status: SHIPPED | OUTPATIENT
Start: 2025-02-08 | End: 2025-03-10

## 2025-02-07 RX ORDER — PANTOPRAZOLE SODIUM 40 MG/1
40 TABLET, DELAYED RELEASE ORAL
Qty: 30 TABLET | Refills: 0 | OUTPATIENT
Start: 2025-02-07 | End: 2025-03-09

## 2025-02-07 RX ORDER — GADOTERATE MEGLUMINE 376.9 MG/ML
36 INJECTION INTRAVENOUS
Status: COMPLETED | OUTPATIENT
Start: 2025-02-07 | End: 2025-02-07

## 2025-02-07 RX ORDER — LOSARTAN POTASSIUM 50 MG/1
50 TABLET ORAL DAILY
Status: DISCONTINUED | OUTPATIENT
Start: 2025-02-07 | End: 2025-02-07 | Stop reason: HOSPADM

## 2025-02-07 RX ORDER — ALLOPURINOL 100 MG/1
100 TABLET ORAL DAILY
Qty: 30 TABLET | Refills: 0 | OUTPATIENT
Start: 2025-02-07 | End: 2025-03-09

## 2025-02-07 RX ADMIN — FOLIC ACID 1 MG: 1 TABLET ORAL at 09:31

## 2025-02-07 RX ADMIN — EPLERENONE 25 MG: 25 TABLET, FILM COATED ORAL at 11:59

## 2025-02-07 RX ADMIN — PREDNISONE 30 MG: 10 TABLET ORAL at 09:31

## 2025-02-07 RX ADMIN — ASPIRIN 81 MG CHEWABLE TABLET 81 MG: 81 TABLET CHEWABLE at 09:31

## 2025-02-07 RX ADMIN — COLCHICINE 0.6 MG: 0.6 TABLET, FILM COATED ORAL at 09:31

## 2025-02-07 RX ADMIN — GADOTERATE MEGLUMINE 36 ML: 376.9 INJECTION INTRAVENOUS at 08:48

## 2025-02-07 RX ADMIN — THIAMINE HCL TAB 100 MG 100 MG: 100 TAB at 09:31

## 2025-02-07 RX ADMIN — ALLOPURINOL 100 MG: 100 TABLET ORAL at 09:31

## 2025-02-07 RX ADMIN — CARVEDILOL 6.25 MG: 6.25 TABLET, FILM COATED ORAL at 09:31

## 2025-02-07 RX ADMIN — LOSARTAN POTASSIUM 50 MG: 50 TABLET, FILM COATED ORAL at 09:31

## 2025-02-07 RX ADMIN — ACETAMINOPHEN 975 MG: 325 TABLET ORAL at 03:51

## 2025-02-07 ASSESSMENT — COGNITIVE AND FUNCTIONAL STATUS - GENERAL
HELP NEEDED FOR BATHING: A LITTLE
DAILY ACTIVITIY SCORE: 23
CLIMB 3 TO 5 STEPS WITH RAILING: A LITTLE
MOBILITY SCORE: 21
WALKING IN HOSPITAL ROOM: A LITTLE
STANDING UP FROM CHAIR USING ARMS: A LITTLE

## 2025-02-07 ASSESSMENT — PAIN SCALES - GENERAL
PAINLEVEL_OUTOF10: 0 - NO PAIN
PAINLEVEL_OUTOF10: 5 - MODERATE PAIN

## 2025-02-07 NOTE — NURSING NOTE
2/7/2025 @ 1400: MD went in to speak with patient and discovered patient was not in his room or bathroom. Patient last seen at 13:50 by charge RN while he was in the restroom taking a shower. Patient assumed to have eloped as he was no longer in his room. Patient did not notify nursing staff and left without approval. He left the floor with IV in his arm. Patient took all belongings with him as well. Primary team, nursing supervisor, and floor management were all notified. Multiple attempts were made to contact the patient as well as family and all attempts were unsuccessful. Gisell Brown RN.

## 2025-02-07 NOTE — NURSING NOTE
MRI STRESS        Stress protocol: Regadenoson  Regadenoson Dose: 0.4mg  Aminophylline Dose: 100mg     Resting Vitals:  BP: 171/116  HR: 85bpm   SPO2: 97% RA  Resting ECG: NSR     Stress:  0.4mg IV push Regadenoson:   1 min: /93 HR 91bpm 96% RA symptoms: none  2 min: /105 HR 117bpm 97% RA symptoms: none     Reversal/Recovery:  100mg IV push Aminophylline:  1 min: /114 HR 94bpm 95% RA symptoms: none  2 min: /119  HR 90bpm 95% RA symptoms: none  4 min: /129 HR 92bpm 96% RA symptoms: none  6 min: /123  HR 85bpm 96% RA symptoms: none     Per Dr. Jonathan gomez to stress this pt with current DBP since this is chronic hypertension.   Patients resting HR of 85  bpm monika to a maximum of 117 bpm.  Resting BP of 171/116 was the highest of the exam. Patients post stress EKG remained unchanged.  Patient discharged from the [facility] to back to  via transport

## 2025-02-07 NOTE — CARE PLAN
Transitional Care Coordinator Note: Patient discussed with medical team, per medical team Patient assumed to have eloped as he was no longer in his room. Patient did not notify nursing staff and left without approval. He left the floor with IV in his arm. Patient took all belongings with him as well.   Kareem Rinaldi RN  Transitional Care Coordinator

## 2025-02-07 NOTE — SIGNIFICANT EVENT
Went to go assess patient at bedside and patient could not be located. It appears the patient has eloped. Nursing staff and primary team notified.

## 2025-02-07 NOTE — CARE PLAN
Problem: Pain - Adult  Goal: Verbalizes/displays adequate comfort level or baseline comfort level  Outcome: Progressing     Problem: Safety - Adult  Goal: Free from fall injury  Outcome: Progressing     Problem: Discharge Planning  Goal: Discharge to home or other facility with appropriate resources  Outcome: Progressing     Problem: Chronic Conditions and Co-morbidities  Goal: Patient's chronic conditions and co-morbidity symptoms are monitored and maintained or improved  Outcome: Progressing     Problem: Nutrition  Goal: Nutrient intake appropriate for maintaining nutritional needs  Outcome: Progressing     Problem: Fall/Injury  Goal: Not fall by end of shift  Outcome: Progressing  Goal: Be free from injury by end of the shift  Outcome: Progressing  Goal: Verbalize understanding of personal risk factors for fall in the hospital  Outcome: Progressing  Goal: Verbalize understanding of risk factor reduction measures to prevent injury from fall in the home  Outcome: Progressing  Goal: Use assistive devices by end of the shift  Outcome: Progressing  Goal: Pace activities to prevent fatigue by end of the shift  Outcome: Progressing     Problem: Diabetes  Goal: Achieve decreasing blood glucose levels by end of shift  Outcome: Progressing  Goal: Increase stability of blood glucose readings by end of shift  Outcome: Progressing  Goal: Decrease in ketones present in urine by end of shift  Outcome: Progressing  Goal: Maintain electrolyte levels within acceptable range throughout shift  Outcome: Progressing  Goal: Maintain glucose levels >70mg/dl to <250mg/dl throughout shift  Outcome: Progressing  Goal: No changes in neurological exam by end of shift  Outcome: Progressing  Goal: Learn about and adhere to nutrition recommendations by end of shift  Outcome: Progressing  Goal: Vital signs within normal range for age by end of shift  Outcome: Progressing  Goal: Increase self care and/or family involovement by end of  shift  Outcome: Progressing  Goal: Receive DSME education by end of shift  Outcome: Progressing     Problem: Pain  Goal: Takes deep breaths with improved pain control throughout the shift  Outcome: Progressing  Goal: Turns in bed with improved pain control throughout the shift  Outcome: Progressing  Goal: Walks with improved pain control throughout the shift  Outcome: Progressing  Goal: Performs ADL's with improved pain control throughout shift  Outcome: Progressing  Goal: Participates in PT with improved pain control throughout the shift  Outcome: Progressing  Goal: Free from opioid side effects throughout the shift  Outcome: Progressing  Goal: Free from acute confusion related to pain meds throughout the shift  Outcome: Progressing   The patient's goals for the shift include      The clinical goals for the shift include patient will remain HMDS throughout shift

## 2025-02-07 NOTE — SIGNIFICANT EVENT
Received notification from Rheumatology resident that when she went to see pt that he was not there. Nurse informed us his belongings were gone and she had last seen him at 1:59. Attempted to call pt (at all listed numbers) with no response. Attending Dr. Grissom notified.
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
## 2025-02-07 NOTE — DISCHARGE SUMMARY
Discharge Diagnosis  Resistant hypertension    Issues Requiring Follow-Up  Heart failure with reduced EF  Uncontrolled HTN    Discharge Meds     Medication List      START taking these medications     allopurinol 100 mg tablet; Commonly known as: Zyloprim; Take 1 tablet   (100 mg) by mouth once daily.; Start taking on: February 8, 2025   eplerenone 25 mg tablet; Commonly known as: Inspra; Take 1 tablet (25   mg) by mouth once daily.   predniSONE 5 mg tablet; Commonly known as: Deltasone; Take 6 tablets (30   mg) by mouth once daily for 2 days, THEN 4 tablets (20 mg) once daily for   3 days, THEN 2 tablets (10 mg) once daily for 3 days, THEN 1 tablet (5 mg)   once daily for 3 days.; Start taking on: February 8, 2025     CHANGE how you take these medications     aspirin 81 mg chewable tablet; Chew 1 tablet (81 mg) once daily.; Start   taking on: February 8, 2025; What changed: when to take this   losartan 50 mg tablet; Commonly known as: Cozaar; Take 1 tablet (50 mg)   by mouth once daily.; Start taking on: February 8, 2025; What changed:   medication strength, how much to take     CONTINUE taking these medications     albuterol 90 mcg/actuation inhaler   atorvastatin 80 mg tablet; Commonly known as: Lipitor; Take 1 tablet (80   mg) by mouth once daily at bedtime.   carvedilol 6.25 mg tablet; Commonly known as: Coreg; Take 1 tablet (6.25   mg) by mouth 2 times a day.   Certavite-Antioxidant  mg-mcg tablet; Generic drug: multivitamin   with minerals; Take 1 tablet by mouth once daily.   colchicine 0.6 mg tablet; Take 1 tablet (0.6 mg) by mouth once daily.;   Start taking on: February 8, 2025   folic acid 1 mg tablet; Commonly known as: Folvite; Take 1 tablet (1 mg)   by mouth once daily.; Start taking on: February 8, 2025   nitroglycerin 0.4 mg SL tablet; Commonly known as: Nitrostat; Place 1   tablet (0.4 mg) under the tongue every 5 minutes if needed for chest pain.   thiamine 100 mg tablet; Commonly known as:  Vitamin B-1; Take 1 tablet   (100 mg) by mouth once daily. Do not start before January 30, 2025.       Test Results Pending At Discharge  Pending Labs       Order Current Status    [id number] Typing In process    Blood Culture Preliminary result    Blood Culture Preliminary result            Hospital Course  Theron Moran is a 44 y.o. male who presented on 2/2/2025 and had a 3-day complicated hospitalization for Resistant hypertension.     He has a PMHx of polysubstance use disorder, subarachnoid hemorrhage, mood disorder, HTN, diabetes, gout, SI and HI, and hypertensive emergency with flash pulmonary edema who presented to the ED on 2/2/25 for chest pain, bilateral foot pain, and hypertension.    In the ED, patient's BP was 242/185 on presentation. He received 10mg labetalol, losartan 25, and coreg 6.25mg with improvement in his BP to 169/121. He also received ASA 324mg and was started on heparin gtt. Colchicine 0.6mg, Meloxicam and IV morphine for pain which provided some relief. Labs were notable for WBC 16, troponin 248 -> 234 -> 207, uric acid 8 (H), and utox positive for cocaine and opiates (+ opiates likely from morphine). EKG with no ST changes. CTA chest with no aortic pathology and some GGOs which could not exclude atypical infectious process. Patient was admitted to general cardiology for further management of hypertensive urgency.    Cardiac MRI was obtained 2/7 which showed severely reduced EF 22%. GDMT was initiated. Patient  then later 169. Plan was to increase meds for BP control and GDMT with possible DC 2/8.     2/7 2 PM Received notification from Rheumatology resident that when she went to see pt that he was not there. Nurse informed us his belongings were gone and she had last seen him at 1:59. Attempted to call pt (at all listed numbers) with no response. Attending Dr. Grissom notified. It appears patient eloped without notifying any staff member, left with IV in his arm and belongings but no  medications. Did not return within 4 hours of elopement therefore discharged AMA.      Pertinent Physical Exam At Time of Discharge  Physical Exam  Morning of patient elopement exam:  Physical Exam  Constitutional:       General: He is not in acute distress.  HENT:      Mouth/Throat:      Mouth: Mucous membranes are moist.   Eyes:      General: No scleral icterus.     Conjunctiva/sclera: Conjunctivae normal.   Cardiovascular:      Rate and Rhythm: Normal rate and regular rhythm.      Pulses: Normal pulses.      Heart sounds: Normal heart sounds.   Pulmonary:      Effort: Pulmonary effort is normal. No respiratory distress.      Breath sounds: Normal breath sounds.   Abdominal:      Palpations: Abdomen is soft.      Tenderness: There is no abdominal tenderness.   Musculoskeletal:      Cervical back: Normal range of motion.      Right foot: Swelling and tenderness present.      Left foot: Swelling and tenderness present.      Comments: Skin around toes, hyperpigemented   Neurological:      General: No focal deficit present.      Mental Status: He is alert and oriented to person, place, and time.   Psychiatric:         Mood and Affect: Mood normal.         Behavior: Behavior normal.     Outpatient Follow-Up  Future Appointments   Date Time Provider Department [place]   2/11/2025 11:20 AM Yvette Kelsey MD [id number] [facility]   2/17/2025  8:00 AM Dinorah Mcconnell MD [id number] [facility]         Nely Santiago MD

## 2025-02-07 NOTE — CARE PLAN
The patient's goals for the shift include      The clinical goals for the shift include pt will remain HDS throughout the shift    Over the shift, the patient did make progress toward the following goals.     Problem: Pain - Adult  Goal: Verbalizes/displays adequate comfort level or baseline comfort level  Outcome: Progressing     Problem: Safety - Adult  Goal: Free from fall injury  Outcome: Progressing     Problem: Discharge Planning  Goal: Discharge to home or other facility with appropriate resources  Outcome: Progressing     Problem: Chronic Conditions and Co-morbidities  Goal: Patient's chronic conditions and co-morbidity symptoms are monitored and maintained or improved  Outcome: Progressing     Problem: Nutrition  Goal: Nutrient intake appropriate for maintaining nutritional needs  Outcome: Progressing     Problem: Fall/Injury  Goal: Not fall by end of shift  Outcome: Progressing  Goal: Be free from injury by end of the shift  Outcome: Progressing  Goal: Verbalize understanding of personal risk factors for fall in the hospital  Outcome: Progressing  Goal: Verbalize understanding of risk factor reduction measures to prevent injury from fall in the home  Outcome: Progressing  Goal: Use assistive devices by end of the shift  Outcome: Progressing  Goal: Pace activities to prevent fatigue by end of the shift  Outcome: Progressing     Problem: Diabetes  Goal: Achieve decreasing blood glucose levels by end of shift  Outcome: Progressing  Goal: Increase stability of blood glucose readings by end of shift  Outcome: Progressing  Goal: Decrease in ketones present in urine by end of shift  Outcome: Progressing  Goal: Maintain electrolyte levels within acceptable range throughout shift  Outcome: Progressing  Goal: Maintain glucose levels >70mg/dl to <250mg/dl throughout shift  Outcome: Progressing  Goal: No changes in neurological exam by end of shift  Outcome: Progressing  Goal: Learn about and adhere to nutrition  recommendations by end of shift  Outcome: Progressing  Goal: Vital signs within normal range for age by end of shift  Outcome: Progressing  Goal: Increase self care and/or family involovement by end of shift  Outcome: Progressing  Goal: Receive DSME education by end of shift  Outcome: Progressing     Problem: Pain  Goal: Takes deep breaths with improved pain control throughout the shift  Outcome: Progressing  Goal: Turns in bed with improved pain control throughout the shift  Outcome: Progressing  Goal: Walks with improved pain control throughout the shift  Outcome: Progressing  Goal: Performs ADL's with improved pain control throughout shift  Outcome: Progressing  Goal: Participates in PT with improved pain control throughout the shift  Outcome: Progressing  Goal: Free from opioid side effects throughout the shift  Outcome: Progressing  Goal: Free from acute confusion related to pain meds throughout the shift  Outcome: Progressing

## 2025-02-08 LAB
BACTERIA BLD CULT: NORMAL
BACTERIA BLD CULT: NORMAL

## 2025-02-12 LAB
ATRIAL RATE: 62 BPM
HLA-B*58:01 BLD/T QL: POSITIVE
P AXIS: 67 DEGREES
P OFFSET: 201 MS
P ONSET: 143 MS
PR INTERVAL: 156 MS
Q ONSET: 221 MS
QRS COUNT: 10 BEATS
QRS DURATION: 88 MS
QT INTERVAL: 440 MS
QTC CALCULATION(BAZETT): 446 MS
QTC FREDERICIA: 445 MS
R AXIS: 68 DEGREES
T AXIS: 50 DEGREES
T OFFSET: 441 MS
VENTRICULAR RATE: 62 BPM

## 2025-02-17 ENCOUNTER — APPOINTMENT (OUTPATIENT)
Dept: RHEUMATOLOGY | Facility: CLINIC | Age: 45
End: 2025-02-17
Payer: COMMERCIAL

## 2025-02-19 NOTE — DOCUMENTATION CLARIFICATION NOTE
"    PATIENT:               DAYA SHUKLA  ACCT #:                  [id number]  MRN:                       [id number]  :                       1980  ADMIT DATE:       2025 6:56 AM  DISCH DATE:        2025 6:32 PM  RESPONDING PROVIDER #:        [id number]          PROVIDER RESPONSE TEXT:    HTN Urgency related to Cocaine Use    CDI QUERY TEXT:    Clarification        Instruction:  Based on your assessment of the patient and the clinical information, please provide the requested documentation by clicking on the appropriate radio button and enter any additional information if prompted.    Question: Please clarify if a relationship exists between    When answering this query, please exercise your independent professional judgment. The fact that a question is being asked, does not imply that any particular answer is desired or expected.    The patient's clinical indicators include:  Clinical Information: 43 y/o male presented to ED with chest pain, bilateral foot pain, and hypertension.    Clinical Indicators:  Cardiology H&P  revealed  \"Chest pain  Troponinemia  Hypertensive urgency  -Troponins 248 -> 234 -> 207, likely d/t demand ischemia from hypertensive urgency  -EKG negative for ischemic changes  -Utox positive for cocaine despite patient reporting no use for 8-9 days, should not still be positive if that is true -> most likely cause of hypertensive urgency combined with possible medication nonadherence\"    \"Significant Event Note  revealed  \"Chest pain  Cocaine  -in the setting of severe blood pressure elevation and positive cocaine UDS (UDS usually negative if not used in the past 5 days)  -likely type 2, he has had a negative stress test 3 months ago\"      Treatment: Labetalol 10mg IVP / at 1057. Repeat troponin,  1L LR    Risk Factors: HTN Urgency  Options provided:  -- HTN Urgency related to Cocaine Use  -- HTN Urgency unrelated to Cocaine Use  -- Other - I will add my own diagnosis  -- Refer " to Clinical Documentation Reviewer    Query created by: Barbie Best on 2/13/2025 12:35 PM      Electronically signed by:  DEMARCO MCGARRY MD 2/19/2025 6:52 AM

## 2025-02-20 LAB
ATRIAL RATE: 120 BPM
P AXIS: 65 DEGREES
P OFFSET: 202 MS
P ONSET: 140 MS
PR INTERVAL: 160 MS
Q ONSET: 220 MS
QRS COUNT: 20 BEATS
QRS DURATION: 82 MS
QT INTERVAL: 344 MS
QTC CALCULATION(BAZETT): 486 MS
QTC FREDERICIA: 433 MS
R AXIS: 75 DEGREES
T AXIS: 80 DEGREES
T OFFSET: 392 MS
VENTRICULAR RATE: 120 BPM